# Patient Record
Sex: FEMALE | Race: OTHER | Employment: UNEMPLOYED | URBAN - METROPOLITAN AREA
[De-identification: names, ages, dates, MRNs, and addresses within clinical notes are randomized per-mention and may not be internally consistent; named-entity substitution may affect disease eponyms.]

---

## 2017-04-27 DIAGNOSIS — M25.531 RIGHT WRIST PAIN: Primary | ICD-10-CM

## 2017-05-01 ENCOUNTER — OFFICE VISIT (OUTPATIENT)
Dept: ORTHOPEDIC SURGERY | Age: 62
End: 2017-05-01
Payer: MEDICARE

## 2017-05-01 VITALS — HEIGHT: 62 IN | BODY MASS INDEX: 25.76 KG/M2 | WEIGHT: 140 LBS

## 2017-05-01 DIAGNOSIS — S52.501D CLOSED FRACTURE OF DISTAL END OF RIGHT RADIUS WITH ROUTINE HEALING, UNSPECIFIED FRACTURE MORPHOLOGY, SUBSEQUENT ENCOUNTER: Primary | ICD-10-CM

## 2017-05-01 PROCEDURE — 99213 OFFICE O/P EST LOW 20 MIN: CPT | Performed by: ORTHOPAEDIC SURGERY

## 2017-05-01 ASSESSMENT — ENCOUNTER SYMPTOMS
CHOKING: 0
NAUSEA: 0
CONSTIPATION: 0
COUGH: 0
ABDOMINAL PAIN: 1
CHEST TIGHTNESS: 1
WHEEZING: 0
SHORTNESS OF BREATH: 0
TROUBLE SWALLOWING: 0
VOICE CHANGE: 0
DIARRHEA: 0
VOMITING: 1

## 2017-05-02 ENCOUNTER — HOSPITAL ENCOUNTER (OUTPATIENT)
Dept: MAMMOGRAPHY | Age: 62
Discharge: HOME | End: 2017-05-02
Payer: MEDICARE

## 2017-05-02 DIAGNOSIS — Z12.39 BREAST CANCER SCREENING, HIGH RISK PATIENT: ICD-10-CM

## 2017-05-02 DIAGNOSIS — C50.911 MALIGNANT NEOPLASM OF RIGHT FEMALE BREAST, UNSPECIFIED SITE OF BREAST: ICD-10-CM

## 2017-05-02 PROCEDURE — 77063 BREAST TOMOSYNTHESIS BI: CPT

## 2018-05-24 ENCOUNTER — HOSPITAL ENCOUNTER (OUTPATIENT)
Dept: PREADMISSION TESTING | Age: 63
Discharge: HOME | End: 2018-05-28
Payer: MEDICARE

## 2018-05-24 VITALS
BODY MASS INDEX: 31.47 KG/M2 | HEIGHT: 61 IN | DIASTOLIC BLOOD PRESSURE: 85 MMHG | SYSTOLIC BLOOD PRESSURE: 143 MMHG | OXYGEN SATURATION: 94 % | RESPIRATION RATE: 16 BRPM | WEIGHT: 166.67 LBS | HEART RATE: 65 BPM

## 2018-05-24 LAB
ABO/RH: NORMAL
ABSOLUTE EOS #: 0.1 K/UL (ref 0–0.4)
ABSOLUTE IMMATURE GRANULOCYTE: ABNORMAL K/UL (ref 0–0.3)
ABSOLUTE LYMPH #: 1.4 K/UL (ref 1–4.8)
ABSOLUTE MONO #: 0.5 K/UL (ref 0.2–0.8)
ALBUMIN SERPL-MCNC: 4.5 G/DL (ref 3.5–5.2)
ALBUMIN/GLOBULIN RATIO: ABNORMAL (ref 1–2.5)
ALP BLD-CCNC: 88 U/L (ref 35–104)
ALT SERPL-CCNC: 20 U/L (ref 5–33)
ANION GAP SERPL CALCULATED.3IONS-SCNC: 12 MMOL/L (ref 9–17)
ANTIBODY SCREEN: NEGATIVE
ARM BAND NUMBER: NORMAL
AST SERPL-CCNC: 17 U/L
BASOPHILS # BLD: 1 % (ref 0–2)
BASOPHILS ABSOLUTE: 0 K/UL (ref 0–0.2)
BILIRUB SERPL-MCNC: 0.46 MG/DL (ref 0.3–1.2)
BILIRUBIN URINE: NEGATIVE
BUN BLDV-MCNC: 17 MG/DL (ref 8–23)
BUN/CREAT BLD: 26 (ref 9–20)
CALCIUM SERPL-MCNC: 9.1 MG/DL (ref 8.6–10.4)
CHLORIDE BLD-SCNC: 103 MMOL/L (ref 98–107)
CO2: 26 MMOL/L (ref 20–31)
COLOR: YELLOW
COMMENT UA: NORMAL
CREAT SERPL-MCNC: 0.65 MG/DL (ref 0.5–0.9)
DIFFERENTIAL TYPE: ABNORMAL
DU ANTIGEN: NEGATIVE
EKG ATRIAL RATE: 120 BPM
EKG P AXIS: 26 DEGREES
EKG P-R INTERVAL: 142 MS
EKG Q-T INTERVAL: 470 MS
EKG QRS DURATION: 90 MS
EKG QTC CALCULATION (BAZETT): 473 MS
EKG R AXIS: -42 DEGREES
EKG T AXIS: 19 DEGREES
EKG VENTRICULAR RATE: 61 BPM
EOSINOPHILS RELATIVE PERCENT: 2 % (ref 1–4)
EXPIRATION DATE: NORMAL
GFR AFRICAN AMERICAN: >60 ML/MIN
GFR NON-AFRICAN AMERICAN: >60 ML/MIN
GFR SERPL CREATININE-BSD FRML MDRD: ABNORMAL ML/MIN/{1.73_M2}
GFR SERPL CREATININE-BSD FRML MDRD: ABNORMAL ML/MIN/{1.73_M2}
GLUCOSE BLD-MCNC: 99 MG/DL (ref 70–99)
GLUCOSE URINE: NEGATIVE
HCT VFR BLD CALC: 45.6 % (ref 36–46)
HEMOGLOBIN: 15.2 G/DL (ref 12–16)
IMMATURE GRANULOCYTES: ABNORMAL %
KETONES, URINE: NEGATIVE
LEUKOCYTE ESTERASE, URINE: NEGATIVE
LYMPHOCYTES # BLD: 26 % (ref 24–44)
MCH RBC QN AUTO: 29 PG (ref 26–34)
MCHC RBC AUTO-ENTMCNC: 33.4 G/DL (ref 31–37)
MCV RBC AUTO: 86.9 FL (ref 80–100)
MONOCYTES # BLD: 9 % (ref 1–7)
MRSA, DNA, NASAL: NORMAL
NITRITE, URINE: NEGATIVE
NRBC AUTOMATED: ABNORMAL PER 100 WBC
PDW BLD-RTO: 13.6 % (ref 11.5–14.5)
PH UA: 7 (ref 5–8)
PLATELET # BLD: 299 K/UL (ref 130–400)
PLATELET ESTIMATE: ABNORMAL
PMV BLD AUTO: 7.8 FL (ref 6–12)
POTASSIUM SERPL-SCNC: 4.7 MMOL/L (ref 3.7–5.3)
PROTEIN UA: NEGATIVE
RBC # BLD: 5.25 M/UL (ref 4–5.2)
RBC # BLD: ABNORMAL 10*6/UL
SEDIMENTATION RATE, ERYTHROCYTE: 20 MM (ref 0–20)
SEG NEUTROPHILS: 62 % (ref 36–66)
SEGMENTED NEUTROPHILS ABSOLUTE COUNT: 3.4 K/UL (ref 1.8–7.7)
SODIUM BLD-SCNC: 141 MMOL/L (ref 135–144)
SPECIFIC GRAVITY UA: 1.01 (ref 1–1.03)
SPECIMEN DESCRIPTION: NORMAL
TOTAL PROTEIN: 7.8 G/DL (ref 6.4–8.3)
TURBIDITY: CLEAR
URINE HGB: NEGATIVE
UROBILINOGEN, URINE: NORMAL
WBC # BLD: 5.5 K/UL (ref 3.5–11)
WBC # BLD: ABNORMAL 10*3/UL

## 2018-05-24 PROCEDURE — 86900 BLOOD TYPING SEROLOGIC ABO: CPT

## 2018-05-24 PROCEDURE — 81003 URINALYSIS AUTO W/O SCOPE: CPT

## 2018-05-24 PROCEDURE — 87641 MR-STAPH DNA AMP PROBE: CPT

## 2018-05-24 PROCEDURE — 93005 ELECTROCARDIOGRAM TRACING: CPT

## 2018-05-24 PROCEDURE — 86850 RBC ANTIBODY SCREEN: CPT

## 2018-05-24 PROCEDURE — 85651 RBC SED RATE NONAUTOMATED: CPT

## 2018-05-24 PROCEDURE — 36415 COLL VENOUS BLD VENIPUNCTURE: CPT

## 2018-05-24 PROCEDURE — 80053 COMPREHEN METABOLIC PANEL: CPT

## 2018-05-24 PROCEDURE — 86901 BLOOD TYPING SEROLOGIC RH(D): CPT

## 2018-05-24 PROCEDURE — 85025 COMPLETE CBC W/AUTO DIFF WBC: CPT

## 2018-05-24 RX ORDER — PANTOPRAZOLE SODIUM 40 MG/1
40 TABLET, DELAYED RELEASE ORAL NIGHTLY
COMMUNITY

## 2018-05-24 RX ORDER — TIZANIDINE 4 MG/1
4 TABLET ORAL NIGHTLY
COMMUNITY

## 2018-05-24 RX ORDER — GABAPENTIN 100 MG/1
100 CAPSULE ORAL NIGHTLY
COMMUNITY

## 2018-05-24 RX ORDER — BUPRENORPHINE 15 UG/H
1 PATCH TRANSDERMAL WEEKLY
COMMUNITY

## 2018-05-24 RX ORDER — DULOXETIN HYDROCHLORIDE 30 MG/1
30 CAPSULE, DELAYED RELEASE ORAL NIGHTLY
COMMUNITY

## 2018-05-24 RX ORDER — METOPROLOL SUCCINATE 25 MG/1
25 TABLET, EXTENDED RELEASE ORAL NIGHTLY
Status: ON HOLD | COMMUNITY
End: 2022-10-03 | Stop reason: HOSPADM

## 2018-05-24 ASSESSMENT — PAIN DESCRIPTION - ONSET: ONSET: AWAKENED FROM SLEEP

## 2018-05-24 ASSESSMENT — PAIN DESCRIPTION - PROGRESSION: CLINICAL_PROGRESSION: GRADUALLY WORSENING

## 2018-05-24 ASSESSMENT — PAIN DESCRIPTION - LOCATION: LOCATION: KNEE

## 2018-05-24 ASSESSMENT — PAIN DESCRIPTION - ORIENTATION: ORIENTATION: LEFT

## 2018-05-24 ASSESSMENT — PAIN DESCRIPTION - PAIN TYPE: TYPE: CHRONIC PAIN

## 2018-05-24 ASSESSMENT — PAIN DESCRIPTION - DESCRIPTORS: DESCRIPTORS: BURNING;SHOOTING

## 2018-05-24 ASSESSMENT — PAIN SCALES - GENERAL: PAINLEVEL_OUTOF10: 3

## 2018-05-24 ASSESSMENT — PAIN DESCRIPTION - FREQUENCY: FREQUENCY: CONTINUOUS

## 2018-06-01 ASSESSMENT — PROMIS GLOBAL HEALTH SCALE
IN GENERAL, HOW WOULD YOU RATE YOUR PHYSICAL HEALTH [ON A SCALE OF 1 (POOR) TO 5 (EXCELLENT)]?: 2
TO WHAT EXTENT ARE YOU ABLE TO CARRY OUT YOUR EVERYDAY PHYSICAL ACTIVITIES SUCH AS WALKING, CLIMBING STAIRS, CARRYING GROCERIES, OR MOVING A CHAIR [ON A SCALE OF 1 (NOT AT ALL) TO 5 (COMPLETELY)]?: 3
IN GENERAL, HOW WOULD YOU RATE YOUR MENTAL HEALTH, INCLUDING YOUR MOOD AND YOUR ABILITY TO THINK [ON A SCALE OF 1 (POOR) TO 5 (EXCELLENT)]?: 2
IN THE PAST 7 DAYS, HOW WOULD YOU RATE YOUR PAIN ON AVERAGE [ON A SCALE FROM 0 (NO PAIN) TO 10 (WORST IMAGINABLE PAIN)]?: 6
IN THE PAST 7 DAYS, HOW OFTEN HAVE YOU BEEN BOTHERED BY EMOTIONAL PROBLEMS, SUCH AS FEELING ANXIOUS, DEPRESSED, OR IRRITABLE [ON A SCALE FROM 1 (NEVER) TO 5 (ALWAYS)]?: 3
IN GENERAL, PLEASE RATE HOW WELL YOU CARRY OUT YOUR USUAL SOCIAL ACTIVITIES (INCLUDES ACTIVITIES AT HOME, AT WORK, AND IN YOUR COMMUNITY, AND RESPONSIBILITIES AS A PARENT, CHILD, SPOUSE, EMPLOYEE, FRIEND, ETC) [ON A SCALE OF 1 (POOR) TO 5 (EXCELLENT)]?: 2
SUM OF RESPONSES TO QUESTIONS 3, 6, 7, & 8: 14
IN GENERAL, HOW WOULD YOU RATE YOUR SATISFACTION WITH YOUR SOCIAL ACTIVITIES AND RELATIONSHIPS [ON A SCALE OF 1 (POOR) TO 5 (EXCELLENT)]?: 3
SUM OF RESPONSES TO QUESTIONS 2, 4, 5, & 10: 10
IN GENERAL, WOULD YOU SAY YOUR HEALTH IS...[ON A SCALE OF 1 (POOR) TO 5 (EXCELLENT)]: 3
IN THE PAST 7 DAYS, HOW WOULD YOU RATE YOUR FATIGUE ON AVERAGE [ON A SCALE FROM 1 (NONE) TO 5 (VERY SEVERE)]?: 3
IN GENERAL, WOULD YOU SAY YOUR QUALITY OF LIFE IS...[ON A SCALE OF 1 (POOR) TO 5 (EXCELLENT)]: 2

## 2018-06-01 ASSESSMENT — KOOS JR
GOING UP OR DOWN STAIRS: 2
BENDING TO THE FLOOR TO PICK UP OBJECT: 3
TWISING OR PIVOTING ON KNEE: 3
STRAIGHTENING KNEE FULLY: 2
HOW SEVERE IS YOUR KNEE STIFFNESS AFTER FIRST WAKING IN MORNING: 2
STANDING UPRIGHT: 2
RISING FROM SITTING: 3

## 2018-06-08 ENCOUNTER — ANESTHESIA EVENT (OUTPATIENT)
Dept: OPERATING ROOM | Age: 63
DRG: 302 | End: 2018-06-08
Payer: MEDICARE

## 2018-06-11 ENCOUNTER — HOSPITAL ENCOUNTER (INPATIENT)
Age: 63
LOS: 1 days | Discharge: NURSING, REHAB OR LONG-TERM CARE | DRG: 302 | End: 2018-06-12
Attending: ORTHOPAEDIC SURGERY | Admitting: ORTHOPAEDIC SURGERY
Payer: MEDICARE

## 2018-06-11 ENCOUNTER — APPOINTMENT (OUTPATIENT)
Dept: GENERAL RADIOLOGY | Age: 63
DRG: 302 | End: 2018-06-11
Attending: ORTHOPAEDIC SURGERY
Payer: MEDICARE

## 2018-06-11 ENCOUNTER — ANESTHESIA (OUTPATIENT)
Dept: OPERATING ROOM | Age: 63
DRG: 302 | End: 2018-06-11
Payer: MEDICARE

## 2018-06-11 VITALS — DIASTOLIC BLOOD PRESSURE: 66 MMHG | OXYGEN SATURATION: 98 % | TEMPERATURE: 99.1 F | SYSTOLIC BLOOD PRESSURE: 133 MMHG

## 2018-06-11 DIAGNOSIS — M17.12 PRIMARY OSTEOARTHRITIS OF LEFT KNEE: Primary | Chronic | ICD-10-CM

## 2018-06-11 PROCEDURE — G8979 MOBILITY GOAL STATUS: HCPCS

## 2018-06-11 PROCEDURE — G8988 SELF CARE GOAL STATUS: HCPCS

## 2018-06-11 PROCEDURE — 6360000002 HC RX W HCPCS: Performed by: ANESTHESIOLOGY

## 2018-06-11 PROCEDURE — 8E0Y0CZ ROBOTIC ASSISTED PROCEDURE OF LOWER EXTREMITY, OPEN APPROACH: ICD-10-PCS | Performed by: ORTHOPAEDIC SURGERY

## 2018-06-11 PROCEDURE — 2580000003 HC RX 258: Performed by: ANESTHESIOLOGY

## 2018-06-11 PROCEDURE — 3600000005 HC SURGERY LEVEL 5 BASE: Performed by: ORTHOPAEDIC SURGERY

## 2018-06-11 PROCEDURE — G0378 HOSPITAL OBSERVATION PER HR: HCPCS

## 2018-06-11 PROCEDURE — G8987 SELF CARE CURRENT STATUS: HCPCS

## 2018-06-11 PROCEDURE — 3700000001 HC ADD 15 MINUTES (ANESTHESIA): Performed by: ORTHOPAEDIC SURGERY

## 2018-06-11 PROCEDURE — 6360000002 HC RX W HCPCS

## 2018-06-11 PROCEDURE — 6360000002 HC RX W HCPCS: Performed by: ORTHOPAEDIC SURGERY

## 2018-06-11 PROCEDURE — 6370000000 HC RX 637 (ALT 250 FOR IP): Performed by: ORTHOPAEDIC SURGERY

## 2018-06-11 PROCEDURE — 73560 X-RAY EXAM OF KNEE 1 OR 2: CPT

## 2018-06-11 PROCEDURE — 2500000003 HC RX 250 WO HCPCS: Performed by: NURSE ANESTHETIST, CERTIFIED REGISTERED

## 2018-06-11 PROCEDURE — 3700000000 HC ANESTHESIA ATTENDED CARE: Performed by: ORTHOPAEDIC SURGERY

## 2018-06-11 PROCEDURE — 6370000000 HC RX 637 (ALT 250 FOR IP): Performed by: ANESTHESIOLOGY

## 2018-06-11 PROCEDURE — 0SRD0J9 REPLACEMENT OF LEFT KNEE JOINT WITH SYNTHETIC SUBSTITUTE, CEMENTED, OPEN APPROACH: ICD-10-PCS | Performed by: ORTHOPAEDIC SURGERY

## 2018-06-11 PROCEDURE — 97166 OT EVAL MOD COMPLEX 45 MIN: CPT

## 2018-06-11 PROCEDURE — 2580000003 HC RX 258: Performed by: ORTHOPAEDIC SURGERY

## 2018-06-11 PROCEDURE — G8978 MOBILITY CURRENT STATUS: HCPCS

## 2018-06-11 PROCEDURE — 7100000000 HC PACU RECOVERY - FIRST 15 MIN: Performed by: ORTHOPAEDIC SURGERY

## 2018-06-11 PROCEDURE — 97535 SELF CARE MNGMENT TRAINING: CPT

## 2018-06-11 PROCEDURE — 2500000003 HC RX 250 WO HCPCS: Performed by: ORTHOPAEDIC SURGERY

## 2018-06-11 PROCEDURE — A6454 SELF-ADHER BAND W>=3" <5"/YD: HCPCS | Performed by: ORTHOPAEDIC SURGERY

## 2018-06-11 PROCEDURE — C1713 ANCHOR/SCREW BN/BN,TIS/BN: HCPCS | Performed by: ORTHOPAEDIC SURGERY

## 2018-06-11 PROCEDURE — 97110 THERAPEUTIC EXERCISES: CPT

## 2018-06-11 PROCEDURE — 1200000000 HC SEMI PRIVATE

## 2018-06-11 PROCEDURE — 3600000015 HC SURGERY LEVEL 5 ADDTL 15MIN: Performed by: ORTHOPAEDIC SURGERY

## 2018-06-11 PROCEDURE — C1776 JOINT DEVICE (IMPLANTABLE): HCPCS | Performed by: ORTHOPAEDIC SURGERY

## 2018-06-11 PROCEDURE — 2500000003 HC RX 250 WO HCPCS: Performed by: ANESTHESIOLOGY

## 2018-06-11 PROCEDURE — 2720000010 HC SURG SUPPLY STERILE: Performed by: ORTHOPAEDIC SURGERY

## 2018-06-11 PROCEDURE — 2500000003 HC RX 250 WO HCPCS

## 2018-06-11 PROCEDURE — 7100000001 HC PACU RECOVERY - ADDTL 15 MIN: Performed by: ORTHOPAEDIC SURGERY

## 2018-06-11 PROCEDURE — 64447 NJX AA&/STRD FEMORAL NRV IMG: CPT | Performed by: ANESTHESIOLOGY

## 2018-06-11 PROCEDURE — 97116 GAIT TRAINING THERAPY: CPT

## 2018-06-11 PROCEDURE — 2580000003 HC RX 258

## 2018-06-11 PROCEDURE — 97162 PT EVAL MOD COMPLEX 30 MIN: CPT

## 2018-06-11 DEVICE — CEMENT BNE 40GM FULL DOSE PMMA W/O ANTIBIO HI VISC N RADPQ: Type: IMPLANTABLE DEVICE | Site: KNEE | Status: FUNCTIONAL

## 2018-06-11 DEVICE — GENESIS II OVAL RESURFACING                                    PATELLAR 35MM
Type: IMPLANTABLE DEVICE | Site: KNEE | Status: FUNCTIONAL
Brand: GENESIS II

## 2018-06-11 DEVICE — JOURNEY II BCS FEMORAL OXINIUM                                    LEFT SIZE 4
Type: IMPLANTABLE DEVICE | Site: KNEE | Status: FUNCTIONAL
Brand: JOURNEY

## 2018-06-11 DEVICE — JOURNEY II BCS XLPE ARTICULAR                                    INSERT SIZE 3-4 LEFT 15MM
Type: IMPLANTABLE DEVICE | Site: KNEE | Status: FUNCTIONAL
Brand: JOURNEY

## 2018-06-11 DEVICE — JOURNEY TIBIAL BASEPLATE NONPOROUS                                    LEFT SIZE 3
Type: IMPLANTABLE DEVICE | Site: KNEE | Status: FUNCTIONAL
Brand: JOURNEY

## 2018-06-11 RX ORDER — HYDROMORPHONE HCL 110MG/55ML
0.25 PATIENT CONTROLLED ANALGESIA SYRINGE INTRAVENOUS EVERY 5 MIN PRN
Status: DISCONTINUED | OUTPATIENT
Start: 2018-06-11 | End: 2018-06-11 | Stop reason: HOSPADM

## 2018-06-11 RX ORDER — PHENYLEPHRINE HCL IN 0.9% NACL 0.5 MG/5ML
SYRINGE (ML) INTRAVENOUS PRN
Status: DISCONTINUED | OUTPATIENT
Start: 2018-06-11 | End: 2018-06-11 | Stop reason: SDUPTHER

## 2018-06-11 RX ORDER — ONDANSETRON 2 MG/ML
4 INJECTION INTRAMUSCULAR; INTRAVENOUS
Status: DISCONTINUED | OUTPATIENT
Start: 2018-06-11 | End: 2018-06-11 | Stop reason: HOSPADM

## 2018-06-11 RX ORDER — HYDROCODONE BITARTRATE AND ACETAMINOPHEN 5; 325 MG/1; MG/1
1 TABLET ORAL EVERY 4 HOURS PRN
Status: DISCONTINUED | OUTPATIENT
Start: 2018-06-11 | End: 2018-06-12 | Stop reason: HOSPADM

## 2018-06-11 RX ORDER — PROMETHAZINE HYDROCHLORIDE 25 MG/ML
6.25 INJECTION, SOLUTION INTRAMUSCULAR; INTRAVENOUS
Status: DISCONTINUED | OUTPATIENT
Start: 2018-06-11 | End: 2018-06-11 | Stop reason: HOSPADM

## 2018-06-11 RX ORDER — FENTANYL CITRATE 50 UG/ML
50 INJECTION, SOLUTION INTRAMUSCULAR; INTRAVENOUS EVERY 5 MIN PRN
Status: DISCONTINUED | OUTPATIENT
Start: 2018-06-11 | End: 2018-06-11 | Stop reason: HOSPADM

## 2018-06-11 RX ORDER — ONDANSETRON 4 MG/1
4 TABLET, ORALLY DISINTEGRATING ORAL EVERY 6 HOURS PRN
Status: DISCONTINUED | OUTPATIENT
Start: 2018-06-11 | End: 2018-06-12 | Stop reason: HOSPADM

## 2018-06-11 RX ORDER — ATORVASTATIN CALCIUM 10 MG/1
10 TABLET, FILM COATED ORAL NIGHTLY
Status: DISCONTINUED | OUTPATIENT
Start: 2018-06-11 | End: 2018-06-12 | Stop reason: HOSPADM

## 2018-06-11 RX ORDER — SODIUM CHLORIDE 0.9 % (FLUSH) 0.9 %
10 SYRINGE (ML) INJECTION PRN
Status: DISCONTINUED | OUTPATIENT
Start: 2018-06-11 | End: 2018-06-11 | Stop reason: HOSPADM

## 2018-06-11 RX ORDER — PANTOPRAZOLE SODIUM 40 MG/1
40 TABLET, DELAYED RELEASE ORAL NIGHTLY
Status: DISCONTINUED | OUTPATIENT
Start: 2018-06-11 | End: 2018-06-12 | Stop reason: HOSPADM

## 2018-06-11 RX ORDER — LIDOCAINE HYDROCHLORIDE 10 MG/ML
1 INJECTION, SOLUTION EPIDURAL; INFILTRATION; INTRACAUDAL; PERINEURAL
Status: DISCONTINUED | OUTPATIENT
Start: 2018-06-11 | End: 2018-06-11 | Stop reason: HOSPADM

## 2018-06-11 RX ORDER — ASCORBIC ACID 500 MG
500 TABLET ORAL DAILY
COMMUNITY

## 2018-06-11 RX ORDER — MORPHINE SULFATE 2 MG/ML
2 INJECTION, SOLUTION INTRAMUSCULAR; INTRAVENOUS
Status: DISCONTINUED | OUTPATIENT
Start: 2018-06-11 | End: 2018-06-12 | Stop reason: HOSPADM

## 2018-06-11 RX ORDER — GABAPENTIN 300 MG/1
300 CAPSULE ORAL ONCE
Status: COMPLETED | OUTPATIENT
Start: 2018-06-11 | End: 2018-06-11

## 2018-06-11 RX ORDER — FENTANYL CITRATE 50 UG/ML
25 INJECTION, SOLUTION INTRAMUSCULAR; INTRAVENOUS EVERY 5 MIN PRN
Status: DISCONTINUED | OUTPATIENT
Start: 2018-06-11 | End: 2018-06-11 | Stop reason: HOSPADM

## 2018-06-11 RX ORDER — ONDANSETRON 2 MG/ML
4 INJECTION INTRAMUSCULAR; INTRAVENOUS EVERY 6 HOURS PRN
Status: DISCONTINUED | OUTPATIENT
Start: 2018-06-11 | End: 2018-06-11

## 2018-06-11 RX ORDER — ACETAMINOPHEN 325 MG/1
650 TABLET ORAL EVERY 4 HOURS PRN
Status: DISCONTINUED | OUTPATIENT
Start: 2018-06-11 | End: 2018-06-12 | Stop reason: HOSPADM

## 2018-06-11 RX ORDER — DULOXETIN HYDROCHLORIDE 30 MG/1
30 CAPSULE, DELAYED RELEASE ORAL NIGHTLY
Status: DISCONTINUED | OUTPATIENT
Start: 2018-06-11 | End: 2018-06-12 | Stop reason: HOSPADM

## 2018-06-11 RX ORDER — SODIUM CHLORIDE 9 MG/ML
INJECTION, SOLUTION INTRAVENOUS CONTINUOUS
Status: DISCONTINUED | OUTPATIENT
Start: 2018-06-11 | End: 2018-06-11

## 2018-06-11 RX ORDER — LIDOCAINE HYDROCHLORIDE 10 MG/ML
INJECTION, SOLUTION INFILTRATION; PERINEURAL PRN
Status: DISCONTINUED | OUTPATIENT
Start: 2018-06-11 | End: 2018-06-11 | Stop reason: SDUPTHER

## 2018-06-11 RX ORDER — CELECOXIB 100 MG/1
100 CAPSULE ORAL ONCE
Status: COMPLETED | OUTPATIENT
Start: 2018-06-11 | End: 2018-06-11

## 2018-06-11 RX ORDER — SODIUM CHLORIDE 0.9 % (FLUSH) 0.9 %
10 SYRINGE (ML) INJECTION EVERY 12 HOURS SCHEDULED
Status: DISCONTINUED | OUTPATIENT
Start: 2018-06-11 | End: 2018-06-11 | Stop reason: HOSPADM

## 2018-06-11 RX ORDER — ONDANSETRON 2 MG/ML
4 INJECTION INTRAMUSCULAR; INTRAVENOUS EVERY 6 HOURS PRN
Status: DISCONTINUED | OUTPATIENT
Start: 2018-06-11 | End: 2018-06-12 | Stop reason: HOSPADM

## 2018-06-11 RX ORDER — SODIUM CHLORIDE, SODIUM LACTATE, POTASSIUM CHLORIDE, CALCIUM CHLORIDE 600; 310; 30; 20 MG/100ML; MG/100ML; MG/100ML; MG/100ML
INJECTION, SOLUTION INTRAVENOUS CONTINUOUS PRN
Status: DISCONTINUED | OUTPATIENT
Start: 2018-06-11 | End: 2018-06-11 | Stop reason: SDUPTHER

## 2018-06-11 RX ORDER — CELECOXIB 200 MG/1
200 CAPSULE ORAL 2 TIMES DAILY
Status: DISCONTINUED | OUTPATIENT
Start: 2018-06-11 | End: 2018-06-12 | Stop reason: HOSPADM

## 2018-06-11 RX ORDER — ROPIVACAINE HYDROCHLORIDE 5 MG/ML
INJECTION, SOLUTION EPIDURAL; INFILTRATION; PERINEURAL PRN
Status: DISCONTINUED | OUTPATIENT
Start: 2018-06-11 | End: 2018-06-11 | Stop reason: SDUPTHER

## 2018-06-11 RX ORDER — MORPHINE SULFATE 4 MG/ML
4 INJECTION, SOLUTION INTRAMUSCULAR; INTRAVENOUS
Status: DISCONTINUED | OUTPATIENT
Start: 2018-06-11 | End: 2018-06-12 | Stop reason: HOSPADM

## 2018-06-11 RX ORDER — ALPRAZOLAM 0.5 MG/1
0.5 TABLET ORAL NIGHTLY
Status: DISCONTINUED | OUTPATIENT
Start: 2018-06-11 | End: 2018-06-12 | Stop reason: HOSPADM

## 2018-06-11 RX ORDER — SCOLOPAMINE TRANSDERMAL SYSTEM 1 MG/1
1 PATCH, EXTENDED RELEASE TRANSDERMAL
Status: DISCONTINUED | OUTPATIENT
Start: 2018-06-11 | End: 2018-06-11

## 2018-06-11 RX ORDER — SCOLOPAMINE TRANSDERMAL SYSTEM 1 MG/1
1 PATCH, EXTENDED RELEASE TRANSDERMAL ONCE
Status: DISCONTINUED | OUTPATIENT
Start: 2018-06-11 | End: 2018-06-12 | Stop reason: HOSPADM

## 2018-06-11 RX ORDER — HYDROMORPHONE HCL 110MG/55ML
0.5 PATIENT CONTROLLED ANALGESIA SYRINGE INTRAVENOUS EVERY 5 MIN PRN
Status: DISCONTINUED | OUTPATIENT
Start: 2018-06-11 | End: 2018-06-11 | Stop reason: HOSPADM

## 2018-06-11 RX ORDER — MIDAZOLAM HYDROCHLORIDE 1 MG/ML
INJECTION INTRAMUSCULAR; INTRAVENOUS PRN
Status: DISCONTINUED | OUTPATIENT
Start: 2018-06-11 | End: 2018-06-11 | Stop reason: SDUPTHER

## 2018-06-11 RX ORDER — SODIUM CHLORIDE, SODIUM LACTATE, POTASSIUM CHLORIDE, CALCIUM CHLORIDE 600; 310; 30; 20 MG/100ML; MG/100ML; MG/100ML; MG/100ML
INJECTION, SOLUTION INTRAVENOUS CONTINUOUS
Status: DISCONTINUED | OUTPATIENT
Start: 2018-06-11 | End: 2018-06-11

## 2018-06-11 RX ORDER — BUPRENORPHINE 15 UG/H
1 PATCH TRANSDERMAL WEEKLY
Status: DISCONTINUED | OUTPATIENT
Start: 2018-06-13 | End: 2018-06-12 | Stop reason: HOSPADM

## 2018-06-11 RX ORDER — LETROZOLE 2.5 MG/1
2.5 TABLET, FILM COATED ORAL NIGHTLY
Status: DISCONTINUED | OUTPATIENT
Start: 2018-06-11 | End: 2018-06-12 | Stop reason: HOSPADM

## 2018-06-11 RX ORDER — TRANEXAMIC ACID 100 MG/ML
INJECTION, SOLUTION INTRAVENOUS PRN
Status: DISCONTINUED | OUTPATIENT
Start: 2018-06-11 | End: 2018-06-11 | Stop reason: SDUPTHER

## 2018-06-11 RX ORDER — SODIUM CHLORIDE, SODIUM LACTATE, POTASSIUM CHLORIDE, CALCIUM CHLORIDE 600; 310; 30; 20 MG/100ML; MG/100ML; MG/100ML; MG/100ML
INJECTION, SOLUTION INTRAVENOUS CONTINUOUS
Status: DISCONTINUED | OUTPATIENT
Start: 2018-06-11 | End: 2018-06-12 | Stop reason: HOSPADM

## 2018-06-11 RX ORDER — METOPROLOL SUCCINATE 25 MG/1
25 TABLET, EXTENDED RELEASE ORAL NIGHTLY
Status: DISCONTINUED | OUTPATIENT
Start: 2018-06-11 | End: 2018-06-12 | Stop reason: HOSPADM

## 2018-06-11 RX ORDER — TIZANIDINE 4 MG/1
4 TABLET ORAL NIGHTLY
Status: DISCONTINUED | OUTPATIENT
Start: 2018-06-11 | End: 2018-06-12 | Stop reason: HOSPADM

## 2018-06-11 RX ORDER — DOCUSATE SODIUM 100 MG/1
100 CAPSULE, LIQUID FILLED ORAL 2 TIMES DAILY
Status: DISCONTINUED | OUTPATIENT
Start: 2018-06-11 | End: 2018-06-12 | Stop reason: HOSPADM

## 2018-06-11 RX ORDER — ASCORBIC ACID 500 MG
500 TABLET ORAL DAILY
Status: DISCONTINUED | OUTPATIENT
Start: 2018-06-11 | End: 2018-06-12 | Stop reason: HOSPADM

## 2018-06-11 RX ORDER — FENTANYL CITRATE 50 UG/ML
INJECTION, SOLUTION INTRAMUSCULAR; INTRAVENOUS PRN
Status: DISCONTINUED | OUTPATIENT
Start: 2018-06-11 | End: 2018-06-11 | Stop reason: SDUPTHER

## 2018-06-11 RX ORDER — HYDROCODONE BITARTRATE AND ACETAMINOPHEN 5; 325 MG/1; MG/1
2 TABLET ORAL EVERY 4 HOURS PRN
Status: DISCONTINUED | OUTPATIENT
Start: 2018-06-11 | End: 2018-06-12 | Stop reason: HOSPADM

## 2018-06-11 RX ORDER — BUPIVACAINE HYDROCHLORIDE 7.5 MG/ML
INJECTION, SOLUTION INTRASPINAL PRN
Status: DISCONTINUED | OUTPATIENT
Start: 2018-06-11 | End: 2018-06-11 | Stop reason: SDUPTHER

## 2018-06-11 RX ORDER — MIDAZOLAM HYDROCHLORIDE 1 MG/ML
2 INJECTION INTRAMUSCULAR; INTRAVENOUS ONCE
Status: COMPLETED | OUTPATIENT
Start: 2018-06-11 | End: 2018-06-11

## 2018-06-11 RX ORDER — PROPOFOL 10 MG/ML
INJECTION, EMULSION INTRAVENOUS PRN
Status: DISCONTINUED | OUTPATIENT
Start: 2018-06-11 | End: 2018-06-11 | Stop reason: SDUPTHER

## 2018-06-11 RX ORDER — POLYVINYL ALCOHOL 14 MG/ML
1 SOLUTION/ DROPS OPHTHALMIC EVERY 4 HOURS PRN
Status: DISCONTINUED | OUTPATIENT
Start: 2018-06-11 | End: 2018-06-12 | Stop reason: HOSPADM

## 2018-06-11 RX ORDER — PROPOFOL 10 MG/ML
INJECTION, EMULSION INTRAVENOUS CONTINUOUS PRN
Status: DISCONTINUED | OUTPATIENT
Start: 2018-06-11 | End: 2018-06-11 | Stop reason: SDUPTHER

## 2018-06-11 RX ADMIN — ROPIVACAINE HYDROCHLORIDE 30 ML: 5 INJECTION, SOLUTION EPIDURAL; INFILTRATION; PERINEURAL at 07:06

## 2018-06-11 RX ADMIN — FENTANYL CITRATE 25 MCG: 50 INJECTION, SOLUTION INTRAMUSCULAR; INTRAVENOUS at 07:44

## 2018-06-11 RX ADMIN — SODIUM CHLORIDE, POTASSIUM CHLORIDE, SODIUM LACTATE AND CALCIUM CHLORIDE: 600; 310; 30; 20 INJECTION, SOLUTION INTRAVENOUS at 19:26

## 2018-06-11 RX ADMIN — ALPRAZOLAM 0.5 MG: 0.5 TABLET ORAL at 21:24

## 2018-06-11 RX ADMIN — PANTOPRAZOLE SODIUM 40 MG: 40 TABLET, DELAYED RELEASE ORAL at 21:24

## 2018-06-11 RX ADMIN — METOPROLOL SUCCINATE 25 MG: 25 TABLET, FILM COATED, EXTENDED RELEASE ORAL at 21:24

## 2018-06-11 RX ADMIN — SODIUM CHLORIDE, POTASSIUM CHLORIDE, SODIUM LACTATE AND CALCIUM CHLORIDE: 600; 310; 30; 20 INJECTION, SOLUTION INTRAVENOUS at 07:33

## 2018-06-11 RX ADMIN — VITAMIN D, TAB 1000IU (100/BT) 1000 UNITS: 25 TAB at 15:04

## 2018-06-11 RX ADMIN — VITAMIN D, TAB 1000IU (100/BT) 1000 UNITS: 25 TAB at 21:24

## 2018-06-11 RX ADMIN — CELECOXIB 100 MG: 100 CAPSULE ORAL at 07:14

## 2018-06-11 RX ADMIN — CEFAZOLIN SODIUM 2 G: 10 INJECTION, POWDER, FOR SOLUTION INTRAVENOUS at 15:04

## 2018-06-11 RX ADMIN — SODIUM CHLORIDE, POTASSIUM CHLORIDE, SODIUM LACTATE AND CALCIUM CHLORIDE: 600; 310; 30; 20 INJECTION, SOLUTION INTRAVENOUS at 08:36

## 2018-06-11 RX ADMIN — TIZANIDINE 4 MG: 4 TABLET ORAL at 21:24

## 2018-06-11 RX ADMIN — LIDOCAINE HYDROCHLORIDE 2 ML: 10 INJECTION, SOLUTION INFILTRATION; PERINEURAL at 07:10

## 2018-06-11 RX ADMIN — LETROZOLE 2.5 MG: 2.5 TABLET ORAL at 21:34

## 2018-06-11 RX ADMIN — DOCUSATE SODIUM 100 MG: 100 CAPSULE, LIQUID FILLED ORAL at 21:24

## 2018-06-11 RX ADMIN — MIDAZOLAM HYDROCHLORIDE 2 MG: 1 INJECTION, SOLUTION INTRAMUSCULAR; INTRAVENOUS at 07:32

## 2018-06-11 RX ADMIN — Medication 50 MCG: at 08:36

## 2018-06-11 RX ADMIN — MORPHINE SULFATE 2 MG: 2 INJECTION, SOLUTION INTRAMUSCULAR; INTRAVENOUS at 19:31

## 2018-06-11 RX ADMIN — Medication 100 MCG: at 08:48

## 2018-06-11 RX ADMIN — CEFAZOLIN SODIUM 2 G: 10 INJECTION, POWDER, FOR SOLUTION INTRAVENOUS at 23:23

## 2018-06-11 RX ADMIN — ASPIRIN 325 MG: 325 TABLET, DELAYED RELEASE ORAL at 21:24

## 2018-06-11 RX ADMIN — MIDAZOLAM HYDROCHLORIDE 2 MG: 1 INJECTION, SOLUTION INTRAMUSCULAR; INTRAVENOUS at 07:00

## 2018-06-11 RX ADMIN — MORPHINE SULFATE 2 MG: 2 INJECTION, SOLUTION INTRAMUSCULAR; INTRAVENOUS at 15:04

## 2018-06-11 RX ADMIN — SODIUM CHLORIDE, POTASSIUM CHLORIDE, SODIUM LACTATE AND CALCIUM CHLORIDE: 600; 310; 30; 20 INJECTION, SOLUTION INTRAVENOUS at 06:36

## 2018-06-11 RX ADMIN — HYDROCODONE BITARTRATE AND ACETAMINOPHEN 2 TABLET: 5; 325 TABLET ORAL at 16:43

## 2018-06-11 RX ADMIN — TRANEXAMIC ACID 700 MG: 100 INJECTION, SOLUTION INTRAVENOUS at 10:19

## 2018-06-11 RX ADMIN — BUPIVACAINE HYDROCHLORIDE IN DEXTROSE 1.8 ML: 7.5 INJECTION, SOLUTION SUBARACHNOID at 07:44

## 2018-06-11 RX ADMIN — ASPIRIN 325 MG: 325 TABLET, DELAYED RELEASE ORAL at 15:04

## 2018-06-11 RX ADMIN — ATORVASTATIN CALCIUM 10 MG: 10 TABLET, FILM COATED ORAL at 21:24

## 2018-06-11 RX ADMIN — SODIUM CHLORIDE, POTASSIUM CHLORIDE, SODIUM LACTATE AND CALCIUM CHLORIDE: 600; 310; 30; 20 INJECTION, SOLUTION INTRAVENOUS at 15:05

## 2018-06-11 RX ADMIN — PROPOFOL 50 MG: 10 INJECTION, EMULSION INTRAVENOUS at 07:51

## 2018-06-11 RX ADMIN — Medication 50 MCG: at 08:39

## 2018-06-11 RX ADMIN — PROPOFOL 50 MCG/KG/MIN: 10 INJECTION, EMULSION INTRAVENOUS at 07:53

## 2018-06-11 RX ADMIN — LIDOCAINE HYDROCHLORIDE 3 ML: 10 INJECTION, SOLUTION INFILTRATION; PERINEURAL at 07:06

## 2018-06-11 RX ADMIN — ROPIVACAINE HYDROCHLORIDE 20 ML: 5 INJECTION, SOLUTION EPIDURAL; INFILTRATION; PERINEURAL at 07:10

## 2018-06-11 RX ADMIN — Medication 100 MCG: at 08:31

## 2018-06-11 RX ADMIN — SODIUM CHLORIDE, POTASSIUM CHLORIDE, SODIUM LACTATE AND CALCIUM CHLORIDE: 600; 310; 30; 20 INJECTION, SOLUTION INTRAVENOUS at 11:10

## 2018-06-11 RX ADMIN — Medication 500 MG: at 15:04

## 2018-06-11 RX ADMIN — CELECOXIB 200 MG: 200 CAPSULE ORAL at 21:34

## 2018-06-11 RX ADMIN — TRANEXAMIC ACID 800 MG: 100 INJECTION, SOLUTION INTRAVENOUS at 08:00

## 2018-06-11 RX ADMIN — GABAPENTIN 300 MG: 300 CAPSULE ORAL at 07:14

## 2018-06-11 RX ADMIN — HYDROCODONE BITARTRATE AND ACETAMINOPHEN 2 TABLET: 5; 325 TABLET ORAL at 23:22

## 2018-06-11 RX ADMIN — DOCUSATE SODIUM 100 MG: 100 CAPSULE, LIQUID FILLED ORAL at 15:04

## 2018-06-11 ASSESSMENT — PULMONARY FUNCTION TESTS
PIF_VALUE: 0
PIF_VALUE: 0
PIF_VALUE: 1
PIF_VALUE: 0
PIF_VALUE: 1
PIF_VALUE: 0
PIF_VALUE: 1
PIF_VALUE: 1
PIF_VALUE: 0
PIF_VALUE: 1
PIF_VALUE: 0
PIF_VALUE: 0
PIF_VALUE: 1
PIF_VALUE: 0
PIF_VALUE: 1
PIF_VALUE: 0
PIF_VALUE: 1
PIF_VALUE: 0
PIF_VALUE: 1
PIF_VALUE: 1
PIF_VALUE: 0
PIF_VALUE: 1
PIF_VALUE: 1
PIF_VALUE: 0
PIF_VALUE: 1
PIF_VALUE: 1
PIF_VALUE: 0
PIF_VALUE: 1
PIF_VALUE: 0
PIF_VALUE: 1
PIF_VALUE: 1
PIF_VALUE: 0
PIF_VALUE: 0
PIF_VALUE: 1
PIF_VALUE: 0
PIF_VALUE: 1
PIF_VALUE: 0
PIF_VALUE: 1
PIF_VALUE: 0
PIF_VALUE: 1
PIF_VALUE: 0
PIF_VALUE: 1
PIF_VALUE: 0
PIF_VALUE: 0
PIF_VALUE: 1
PIF_VALUE: 0
PIF_VALUE: 1
PIF_VALUE: 0
PIF_VALUE: 1
PIF_VALUE: 0
PIF_VALUE: 0
PIF_VALUE: 1
PIF_VALUE: 0
PIF_VALUE: 1
PIF_VALUE: 0
PIF_VALUE: 1
PIF_VALUE: 1
PIF_VALUE: 0
PIF_VALUE: 1
PIF_VALUE: 0
PIF_VALUE: 1
PIF_VALUE: 1
PIF_VALUE: 0
PIF_VALUE: 1
PIF_VALUE: 0
PIF_VALUE: 0
PIF_VALUE: 1
PIF_VALUE: 1
PIF_VALUE: 0
PIF_VALUE: 1
PIF_VALUE: 0
PIF_VALUE: 1
PIF_VALUE: 0
PIF_VALUE: 1
PIF_VALUE: 0
PIF_VALUE: 1
PIF_VALUE: 0

## 2018-06-11 ASSESSMENT — PAIN SCALES - GENERAL
PAINLEVEL_OUTOF10: 7
PAINLEVEL_OUTOF10: 2
PAINLEVEL_OUTOF10: 3
PAINLEVEL_OUTOF10: 2
PAINLEVEL_OUTOF10: 3
PAINLEVEL_OUTOF10: 3
PAINLEVEL_OUTOF10: 0
PAINLEVEL_OUTOF10: 4
PAINLEVEL_OUTOF10: 0
PAINLEVEL_OUTOF10: 6
PAINLEVEL_OUTOF10: 2
PAINLEVEL_OUTOF10: 8
PAINLEVEL_OUTOF10: 3
PAINLEVEL_OUTOF10: 6
PAINLEVEL_OUTOF10: 5
PAINLEVEL_OUTOF10: 6

## 2018-06-11 ASSESSMENT — PAIN DESCRIPTION - ORIENTATION
ORIENTATION: LEFT
ORIENTATION: LEFT;ANTERIOR
ORIENTATION: LEFT

## 2018-06-11 ASSESSMENT — PAIN DESCRIPTION - FREQUENCY
FREQUENCY: CONTINUOUS
FREQUENCY: CONTINUOUS

## 2018-06-11 ASSESSMENT — PAIN DESCRIPTION - LOCATION
LOCATION: KNEE

## 2018-06-11 ASSESSMENT — PAIN DESCRIPTION - PROGRESSION
CLINICAL_PROGRESSION: NOT CHANGED
CLINICAL_PROGRESSION: NOT CHANGED

## 2018-06-11 ASSESSMENT — PAIN DESCRIPTION - PAIN TYPE
TYPE: SURGICAL PAIN

## 2018-06-11 ASSESSMENT — PAIN DESCRIPTION - DESCRIPTORS
DESCRIPTORS: DISCOMFORT
DESCRIPTORS: ACHING
DESCRIPTORS: ACHING

## 2018-06-11 ASSESSMENT — PAIN DESCRIPTION - ONSET
ONSET: ON-GOING
ONSET: ON-GOING

## 2018-06-12 VITALS
HEIGHT: 61 IN | RESPIRATION RATE: 16 BRPM | OXYGEN SATURATION: 93 % | BODY MASS INDEX: 31.47 KG/M2 | SYSTOLIC BLOOD PRESSURE: 131 MMHG | WEIGHT: 166.67 LBS | HEART RATE: 85 BPM | DIASTOLIC BLOOD PRESSURE: 68 MMHG | TEMPERATURE: 99 F

## 2018-06-12 PROCEDURE — 97535 SELF CARE MNGMENT TRAINING: CPT | Performed by: NURSE PRACTITIONER

## 2018-06-12 PROCEDURE — 97530 THERAPEUTIC ACTIVITIES: CPT | Performed by: NURSE PRACTITIONER

## 2018-06-12 PROCEDURE — G0378 HOSPITAL OBSERVATION PER HR: HCPCS

## 2018-06-12 PROCEDURE — 97110 THERAPEUTIC EXERCISES: CPT

## 2018-06-12 PROCEDURE — 97530 THERAPEUTIC ACTIVITIES: CPT

## 2018-06-12 PROCEDURE — 6370000000 HC RX 637 (ALT 250 FOR IP): Performed by: ORTHOPAEDIC SURGERY

## 2018-06-12 PROCEDURE — 2580000003 HC RX 258: Performed by: ORTHOPAEDIC SURGERY

## 2018-06-12 PROCEDURE — 97116 GAIT TRAINING THERAPY: CPT

## 2018-06-12 RX ORDER — HYDROCODONE BITARTRATE AND ACETAMINOPHEN 5; 325 MG/1; MG/1
1 TABLET ORAL EVERY 4 HOURS PRN
Qty: 20 TABLET | Refills: 0
Start: 2018-06-12 | End: 2018-06-19

## 2018-06-12 RX ADMIN — VITAMIN D, TAB 1000IU (100/BT) 1000 UNITS: 25 TAB at 07:53

## 2018-06-12 RX ADMIN — CELECOXIB 200 MG: 200 CAPSULE ORAL at 07:53

## 2018-06-12 RX ADMIN — DULOXETINE HYDROCHLORIDE 30 MG: 30 CAPSULE, DELAYED RELEASE ORAL at 00:53

## 2018-06-12 RX ADMIN — HYDROCODONE BITARTRATE AND ACETAMINOPHEN 1 TABLET: 5; 325 TABLET ORAL at 13:49

## 2018-06-12 RX ADMIN — HYDROCODONE BITARTRATE AND ACETAMINOPHEN 2 TABLET: 5; 325 TABLET ORAL at 07:06

## 2018-06-12 RX ADMIN — SODIUM CHLORIDE, POTASSIUM CHLORIDE, SODIUM LACTATE AND CALCIUM CHLORIDE: 600; 310; 30; 20 INJECTION, SOLUTION INTRAVENOUS at 13:49

## 2018-06-12 RX ADMIN — ASPIRIN 325 MG: 325 TABLET, DELAYED RELEASE ORAL at 07:53

## 2018-06-12 RX ADMIN — DOCUSATE SODIUM 100 MG: 100 CAPSULE, LIQUID FILLED ORAL at 07:53

## 2018-06-12 RX ADMIN — Medication 500 MG: at 07:53

## 2018-06-12 RX ADMIN — HYDROCODONE BITARTRATE AND ACETAMINOPHEN 1 TABLET: 5; 325 TABLET ORAL at 17:47

## 2018-06-12 RX ADMIN — SODIUM CHLORIDE, POTASSIUM CHLORIDE, SODIUM LACTATE AND CALCIUM CHLORIDE: 600; 310; 30; 20 INJECTION, SOLUTION INTRAVENOUS at 04:18

## 2018-06-12 ASSESSMENT — PAIN SCALES - GENERAL
PAINLEVEL_OUTOF10: 6
PAINLEVEL_OUTOF10: 2
PAINLEVEL_OUTOF10: 5
PAINLEVEL_OUTOF10: 5
PAINLEVEL_OUTOF10: 4
PAINLEVEL_OUTOF10: 7
PAINLEVEL_OUTOF10: 7

## 2018-06-12 ASSESSMENT — PAIN DESCRIPTION - LOCATION
LOCATION: KNEE

## 2018-06-12 ASSESSMENT — PAIN DESCRIPTION - ORIENTATION
ORIENTATION: LEFT

## 2018-06-12 ASSESSMENT — PAIN DESCRIPTION - ONSET: ONSET: ON-GOING

## 2018-06-12 ASSESSMENT — PAIN DESCRIPTION - PAIN TYPE
TYPE: SURGICAL PAIN
TYPE: SURGICAL PAIN

## 2018-06-12 ASSESSMENT — PAIN DESCRIPTION - FREQUENCY: FREQUENCY: CONTINUOUS

## 2018-06-12 ASSESSMENT — PAIN DESCRIPTION - DESCRIPTORS: DESCRIPTORS: ACHING

## 2018-06-12 ASSESSMENT — PAIN DESCRIPTION - PROGRESSION: CLINICAL_PROGRESSION: NOT CHANGED

## 2020-12-04 ENCOUNTER — ANESTHESIA EVENT (OUTPATIENT)
Dept: OPERATING ROOM | Age: 65
End: 2020-12-04
Payer: COMMERCIAL

## 2020-12-04 ENCOUNTER — HOSPITAL ENCOUNTER (OUTPATIENT)
Dept: PREADMISSION TESTING | Age: 65
Setting detail: SPECIMEN
Discharge: HOME | End: 2020-12-08
Payer: COMMERCIAL

## 2020-12-04 PROCEDURE — U0003 INFECTIOUS AGENT DETECTION BY NUCLEIC ACID (DNA OR RNA); SEVERE ACUTE RESPIRATORY SYNDROME CORONAVIRUS 2 (SARS-COV-2) (CORONAVIRUS DISEASE [COVID-19]), AMPLIFIED PROBE TECHNIQUE, MAKING USE OF HIGH THROUGHPUT TECHNOLOGIES AS DESCRIBED BY CMS-2020-01-R: HCPCS

## 2020-12-05 LAB
SARS-COV-2, RAPID: NORMAL
SARS-COV-2: NORMAL
SARS-COV-2: NOT DETECTED
SOURCE: NORMAL

## 2020-12-06 ENCOUNTER — TELEPHONE (OUTPATIENT)
Dept: PRIMARY CARE CLINIC | Age: 65
End: 2020-12-06

## 2020-12-07 ENCOUNTER — ANESTHESIA (OUTPATIENT)
Dept: OPERATING ROOM | Age: 65
End: 2020-12-07
Payer: COMMERCIAL

## 2020-12-07 ENCOUNTER — HOSPITAL ENCOUNTER (OUTPATIENT)
Age: 65
Setting detail: OUTPATIENT SURGERY
Discharge: HOME | End: 2020-12-07
Attending: INTERNAL MEDICINE | Admitting: INTERNAL MEDICINE
Payer: COMMERCIAL

## 2020-12-07 VITALS
HEART RATE: 58 BPM | RESPIRATION RATE: 16 BRPM | HEIGHT: 61 IN | DIASTOLIC BLOOD PRESSURE: 82 MMHG | WEIGHT: 173 LBS | BODY MASS INDEX: 32.66 KG/M2 | OXYGEN SATURATION: 98 % | SYSTOLIC BLOOD PRESSURE: 136 MMHG | TEMPERATURE: 97.7 F

## 2020-12-07 VITALS — DIASTOLIC BLOOD PRESSURE: 69 MMHG | SYSTOLIC BLOOD PRESSURE: 143 MMHG | OXYGEN SATURATION: 97 %

## 2020-12-07 PROCEDURE — 2500000003 HC RX 250 WO HCPCS: Performed by: NURSE ANESTHETIST, CERTIFIED REGISTERED

## 2020-12-07 PROCEDURE — 3609012400 HC EGD TRANSORAL BIOPSY SINGLE/MULTIPLE: Performed by: INTERNAL MEDICINE

## 2020-12-07 PROCEDURE — 6360000002 HC RX W HCPCS: Performed by: NURSE ANESTHETIST, CERTIFIED REGISTERED

## 2020-12-07 PROCEDURE — 88305 TISSUE EXAM BY PATHOLOGIST: CPT

## 2020-12-07 PROCEDURE — 3700000000 HC ANESTHESIA ATTENDED CARE: Performed by: INTERNAL MEDICINE

## 2020-12-07 PROCEDURE — 7100000010 HC PHASE II RECOVERY - FIRST 15 MIN: Performed by: INTERNAL MEDICINE

## 2020-12-07 PROCEDURE — 2709999900 HC NON-CHARGEABLE SUPPLY: Performed by: INTERNAL MEDICINE

## 2020-12-07 PROCEDURE — 7100000011 HC PHASE II RECOVERY - ADDTL 15 MIN: Performed by: INTERNAL MEDICINE

## 2020-12-07 PROCEDURE — 2580000003 HC RX 258: Performed by: ANESTHESIOLOGY

## 2020-12-07 PROCEDURE — 3700000001 HC ADD 15 MINUTES (ANESTHESIA): Performed by: INTERNAL MEDICINE

## 2020-12-07 RX ORDER — LIDOCAINE HYDROCHLORIDE 10 MG/ML
1 INJECTION, SOLUTION EPIDURAL; INFILTRATION; INTRACAUDAL; PERINEURAL
Status: DISCONTINUED | OUTPATIENT
Start: 2020-12-07 | End: 2020-12-07 | Stop reason: HOSPADM

## 2020-12-07 RX ORDER — PROPOFOL 10 MG/ML
INJECTION, EMULSION INTRAVENOUS PRN
Status: DISCONTINUED | OUTPATIENT
Start: 2020-12-07 | End: 2020-12-07 | Stop reason: SDUPTHER

## 2020-12-07 RX ORDER — SODIUM CHLORIDE 0.9 % (FLUSH) 0.9 %
10 SYRINGE (ML) INJECTION EVERY 12 HOURS SCHEDULED
Status: DISCONTINUED | OUTPATIENT
Start: 2020-12-07 | End: 2020-12-07 | Stop reason: HOSPADM

## 2020-12-07 RX ORDER — SODIUM CHLORIDE 0.9 % (FLUSH) 0.9 %
10 SYRINGE (ML) INJECTION PRN
Status: DISCONTINUED | OUTPATIENT
Start: 2020-12-07 | End: 2020-12-07 | Stop reason: HOSPADM

## 2020-12-07 RX ORDER — SODIUM CHLORIDE 9 MG/ML
INJECTION, SOLUTION INTRAVENOUS CONTINUOUS
Status: DISCONTINUED | OUTPATIENT
Start: 2020-12-08 | End: 2020-12-07

## 2020-12-07 RX ORDER — LIDOCAINE HYDROCHLORIDE 20 MG/ML
INJECTION, SOLUTION EPIDURAL; INFILTRATION; INTRACAUDAL; PERINEURAL PRN
Status: DISCONTINUED | OUTPATIENT
Start: 2020-12-07 | End: 2020-12-07 | Stop reason: SDUPTHER

## 2020-12-07 RX ORDER — SODIUM CHLORIDE, SODIUM LACTATE, POTASSIUM CHLORIDE, CALCIUM CHLORIDE 600; 310; 30; 20 MG/100ML; MG/100ML; MG/100ML; MG/100ML
INJECTION, SOLUTION INTRAVENOUS CONTINUOUS
Status: DISCONTINUED | OUTPATIENT
Start: 2020-12-07 | End: 2020-12-07 | Stop reason: HOSPADM

## 2020-12-07 RX ADMIN — PROPOFOL 50 MG: 10 INJECTION, EMULSION INTRAVENOUS at 10:39

## 2020-12-07 RX ADMIN — PROPOFOL 50 MG: 10 INJECTION, EMULSION INTRAVENOUS at 10:43

## 2020-12-07 RX ADMIN — SODIUM CHLORIDE, POTASSIUM CHLORIDE, SODIUM LACTATE AND CALCIUM CHLORIDE: 600; 310; 30; 20 INJECTION, SOLUTION INTRAVENOUS at 10:13

## 2020-12-07 RX ADMIN — PROPOFOL 20 MG: 10 INJECTION, EMULSION INTRAVENOUS at 10:44

## 2020-12-07 RX ADMIN — LIDOCAINE HYDROCHLORIDE 80 MG: 20 INJECTION, SOLUTION EPIDURAL; INFILTRATION; INTRACAUDAL; PERINEURAL at 10:37

## 2020-12-07 RX ADMIN — PROPOFOL 50 MG: 10 INJECTION, EMULSION INTRAVENOUS at 10:41

## 2020-12-07 RX ADMIN — PROPOFOL 50 MG: 10 INJECTION, EMULSION INTRAVENOUS at 10:37

## 2020-12-07 ASSESSMENT — PULMONARY FUNCTION TESTS
PIF_VALUE: 1
PIF_VALUE: 0
PIF_VALUE: 1
PIF_VALUE: 1
PIF_VALUE: 0
PIF_VALUE: 1
PIF_VALUE: 1

## 2020-12-07 ASSESSMENT — PAIN SCALES - GENERAL
PAINLEVEL_OUTOF10: 0
PAINLEVEL_OUTOF10: 0

## 2020-12-07 ASSESSMENT — PAIN - FUNCTIONAL ASSESSMENT: PAIN_FUNCTIONAL_ASSESSMENT: 0-10

## 2020-12-07 NOTE — OP NOTE
Operative Note      Patient: Cristina Pizarro  YOB: 1955  MRN: [id number]    Date of Procedure: 12/7/2020    Pre-Op Diagnosis: Epigastric abdominal pain    Indication for the procedure: Patient is a pleasant 63years old female who is seen with epigastric pain as well as acid reflux. She is scheduled for EGD for further evaluation. Post-Op Diagnosis: Irregular Z-line, patchy hyperemia of the stomach biopsies were taken to rule out H. pylori. Procedure(s):  EGD BIOPSY    Surgeon(s): Alla Evans MD    Assistant:   * No surgical staff found *    Informed consent: Risks, benefits, and alternatives of the procedure were explained to the patient prior to the procedure. Risks include but not limited to bleeding, perforation, aspiration, allergic reaction to medication or death. Patient was also informed about the risk of missing a lesion. Anesthesia: Monitor Anesthesia Care    Estimated Blood Loss (mL): Minimal    Complications: None    Specimens:   ID Type Source Tests Collected by Time Destination   A : DUODENUM SMALL BOWEL CELIAC  Tissue Duodenum SURGICAL PATHOLOGY Alla Evans MD 12/7/2020 1040    B : STOMACH BX FOR H PYLORI STAIN Tissue Stomach SURGICAL PATHOLOGY Alla Evans MD 12/7/2020 1041    C : DISTAL ESOPHAGUS FOR BARROTTS Tissue Esophagus SURGICAL PATHOLOGY Alla Evans MD 12/7/2020 1042    D : PROXIMAL ESOPHAGUS FOR MICROSCOPIC ESOPHIGITIS Tissue Esophagus SURGICAL PATHOLOGY Alla Evans MD 12/7/2020 1044        Implants:  * No implants in log *      Drains: * No LDAs found *    Findings: 1-irregular Z-line. Biopsies were taken to rule out Murray's esophagus  2-patchy hyperemia of the stomach biopsies were taken to rule out H. Pylori  3-bilious fluid in the stomach    Detailed Description of Procedure:   Patient was placed in the left lateral decubitus position. The well-lubricated Olympus EGD scope was passed through the bite-block was advanced into the esophagus.

## 2020-12-07 NOTE — ANESTHESIA POSTPROCEDURE EVALUATION
Department of Anesthesiology  Postprocedure Note    Patient: Ruben Oakley  MRN: [id number]  YOB: 1955  Date of evaluation: 12/7/2020  Time:  11:57 AM     Procedure Summary     Date:  12/07/20 Room / Location:  [place] [address]    Anesthesia Start:  6261 Anesthesia Stop:  3336    Procedure:  EGD BIOPSY (N/A ) Diagnosis:  (DX REFLUX)    Surgeon:  Cesar Marcano MD Responsible Provider:  Jennifer Mason MD    Anesthesia Type:  MAC ASA Status:  3          Anesthesia Type: MAC    Jason Phase I: Jason Score: 10    Jason Phase II: Jason Score: 5    Last vitals: Reviewed and per EMR flowsheets.        Anesthesia Post Evaluation    Patient location during evaluation: PACU  Complications: no

## 2020-12-07 NOTE — H&P
History and Physical Update    Pt Name: Yasmany Akhtar  MRN: [id number]  YOB: 1955  Date of evaluation: 2020      [x] I have reviewed the hardcopy Gastroenterology Progress Note by Dr Marlin Angela dated 20 labeled in short chart which meets the criteria for an Interval History and Physical note. [x] I have examined  Yasmany Akhtar  There are no changes to the patient who is scheduled for a EGD by Dr Marlin Angela for acid reflux. The patient denies new health changes, fever, chills, wheezing, cough, increased SOB, chest pain, open sores or wounds.  Last ASA 325mg and Celebrex 20     Past Medical History:     Past Medical History:   Diagnosis Date    Anxiety     Cancer [facility])     right breast x 2, salivary gland    Cerebral artery occlusion with cerebral infarction ([address]     Depression     GERD (gastroesophageal reflux disease)     Hyperlipidemia     Hypertension     \"sometimes have high bloodpressure\" no treatment    Osteoarthritis     Peripheral vascular disease ([address]     PONV (postoperative nausea and vomiting)     Shortness of breath     cannot climb 1 flight of stairs without becoming SOB, can do her own housework but she needs to rest during    Snores     Urinary frequency         Past Surgical History:     Past Surgical History:   Procedure Laterality Date    BREAST RECONSTRUCTION Right 2014    BREAST SURGERY Right     breast lumpectomy with chemo and radiation    BUNIONECTOMY Left 2015    BUNIONECTOMY Right 10/10/2016    right foot bunionectomy     SECTION      X 1    COLONOSCOPY      EYE SURGERY      cataract extraction    FOOT SURGERY Bilateral     JOINT REPLACEMENT Left 2018    knee    LIPOMA RESECTION Right     benign mass removed behind right ear    MASTECTOMY Right 2014    skin sparing with sentinel node biopsy and reconstruction with tissue expander implant    NOSE SURGERY      [place] TOTAL KNEE ARTHROPLASTY Left 6/11/2018    ROBOTIC LEFT KNEE TOTAL ARTHROPLASTY performed by Kieran Giron MD at [address] [facility]      for cancer    VARICOSE VEIN SURGERY Bilateral     WRIST FRACTURE SURGERY Right 09/21/2016    [facility]  [facility]        Social History:     Tobacco:    reports that she quit smoking about 34 years ago. Her smoking use included cigarettes. She has never used smokeless tobacco.  Alcohol:      reports no history of alcohol use. Drug Use:  reports no history of drug use. Family History:     Family History   Problem Relation Age of Onset    Cancer Father     Diabetes Brother      Vital signs: /80   Pulse 58   Resp 18   Ht 5' 1\" (1.549 m)   Wt 173 lb (78.5 kg)   LMP 10/06/2006 (LMP Unknown)   SpO2 95%   BMI 32.69 kg/m²     Allergies:  Latex; Fluoxetine; Oxycodone-acetaminophen; Oxycodone-acetaminophen; and Tape [adhesive tape]    Medications:    Prior to Admission medications    Medication Sig Start Date End Date Taking? Authorizing Provider   aspirin 325 MG EC tablet Take 1 tablet by mouth 2 times daily 6/12/18  Yes Kieran Giron MD   vitamin C (ASCORBIC ACID) 500 MG tablet Take 500 mg by mouth daily   Yes Historical Provider, MD   buprenorphine (BUPRENEX) 15 MCG/HR PTWK Place 1 patch onto the skin once a week. .   Yes Historical Provider, MD   Mirabegron ER (MYRBETRIQ) 50 MG TB24 Take 1 tablet by mouth nightly    Yes Historical Provider, MD   metoprolol succinate (TOPROL XL) 25 MG extended release tablet Take 25 mg by mouth nightly    Yes Historical Provider, MD   pantoprazole (PROTONIX) 40 MG tablet Take 40 mg by mouth nightly    Yes Historical Provider, MD   DULoxetine (CYMBALTA) 30 MG extended release capsule Take 30 mg by mouth nightly   Yes Historical Provider, MD   gabapentin (NEURONTIN) 100 MG capsule Take 100 mg by mouth nightly. .   Yes Historical Provider, MD   tiZANidine (ZANAFLEX) 4 MG tablet Take 4 mg by mouth nightly   Yes Historical Provider, MD   atorvastatin

## 2020-12-07 NOTE — ANESTHESIA PRE PROCEDURE
Department of Anesthesiology  Preprocedure Note       Name:  Ailyn Guaman   Age:  63 y.o.  :  1955                                          MRN:  [id number]         Date:  2020      Surgeon: Fadumo Short): Patti Garay MD    Procedure: Procedure(s):  EGD ESOPHAGOGASTRODUODENOSCOPY    Medications prior to admission:   Prior to Admission medications    Medication Sig Start Date End Date Taking? Authorizing Provider   aspirin 325 MG EC tablet Take 1 tablet by mouth 2 times daily 18   Teresa Sebastian MD   vitamin C (ASCORBIC ACID) 500 MG tablet Take 500 mg by mouth daily    Historical Provider, MD   buprenorphine (BUPRENEX) 15 MCG/HR PTWK Place 1 patch onto the skin once a week. [place] Historical Provider, MD   Mirabegron ER (MYRBETRIQ) 50 MG TB24 Take 1 tablet by mouth nightly     Historical Provider, MD   metoprolol succinate (TOPROL XL) 25 MG extended release tablet Take 25 mg by mouth nightly     Historical Provider, MD   pantoprazole (PROTONIX) 40 MG tablet Take 40 mg by mouth nightly     Historical Provider, MD   DULoxetine (CYMBALTA) 30 MG extended release capsule Take 30 mg by mouth nightly    Historical Provider, MD   gabapentin (NEURONTIN) 100 MG capsule Take 100 mg by mouth nightly. [place]     Historical Provider, MD   tiZANidine (ZANAFLEX) 4 MG tablet Take 4 mg by mouth nightly    Historical Provider, MD   atorvastatin (LIPITOR) 10 MG tablet Take 10 mg by mouth    Historical Provider, MD   RESTASIS 0.05 % ophthalmic emulsion INSTILL ONE DROP INTO BOTH EYES TWICE DAILY 16   Historical Provider, MD   ALPRAZolam Carlynn Remak) 0.5 MG tablet Take 0.5 mg by mouth nightly    Historical Provider, MD   celecoxib (CELEBREX) 200 MG capsule Take 200 mg by mouth 2 times daily  8/12/15   Historical Provider, MD   letrozole [facility]) 2.5 MG tablet Take 2.5 mg by mouth nightly  4/20/15   Historical Provider, MD BURKETT VITAMIN D-3 1000 UNITS TABS tablet   Take 1,000 Units by mouth 2 times daily  4/8/15   Historical Provider, MD       Current medications:    Current Facility-Administered Medications   Medication Dose Route Frequency Provider Last Rate Last Dose    [START ON 12/8/2020] 0.9 % sodium chloride infusion   Intravenous Continuous Tristan Adams DO        [START ON 12/8/2020] lactated ringers infusion   Intravenous Continuous Tristan Adams, DO        sodium chloride flush 0.9 % injection 10 mL  10 mL Intravenous 2 times per day Tristanwalter Adams, DO        sodium chloride flush 0.9 % injection 10 mL  10 mL Intravenous PRN Cori Washburn DO        [START ON 12/8/2020] lidocaine PF 1 % injection 1 mL  1 mL Intradermal Once PRN Cori Washburn DO           Allergies: Allergies   Allergen Reactions    Latex Rash     Developed moderate desquamating erythematous rash from tape on skin at time of right mastectomy 12/2014. (Pr orthopedic office patient has a latex allergy June 2018)    Fluoxetine Other (See Comments)     More depressed    Oxycodone-Acetaminophen Other (See Comments)     Patient unsure    Oxycodone-Acetaminophen Other (See Comments)     Patient unsure    Tape Frazee Richie Tape]        Problem List:    Patient Active Problem List   Diagnosis Code    Fracture T14. 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    Primary osteoarthritis of left knee M17.12       Past Medical History:        Diagnosis Date    Anxiety     Cancer ([facility] 2014    right breast x 2, salivary gland    Depression     GERD (gastroesophageal reflux disease)     Hyperlipidemia     Hypertension     \"sometimes have high bloodpressure\" no treatment    Osteoarthritis     Peripheral vascular disease ([facility]     PONV (postoperative nausea and vomiting)     Shortness of breath     cannot climb 1 flight of stairs without becoming SOB, can do her own housework but she needs to rest during    Snores     Urinary frequency        Past Surgical History:        Procedure Laterality Date    BREAST RECONSTRUCTION Right 2014    BREAST SURGERY Right     breast lumpectomy with chemo and radiation    BUNIONECTOMY Left 2015    BUNIONECTOMY Right 10/10/2016    right foot bunionectomy     SECTION      X 1    COLONOSCOPY      EYE SURGERY      cataract extraction    FOOT SURGERY Bilateral     JOINT REPLACEMENT Left 2018    knee    LIPOMA RESECTION Right     benign mass removed behind right ear    MASTECTOMY Right 2014    skin sparing with sentinel node biopsy and reconstruction with tissue expander implant    NOSE SURGERY      [place] TOTAL KNEE ARTHROPLASTY Left 2018    ROBOTIC LEFT KNEE TOTAL ARTHROPLASTY performed by Asif Meza MD at [address] [facility]      for cancer   [address] Bilateral     WRIST FRACTURE SURGERY Right 2016    [facility]       Social History:    Social History     Tobacco Use    Smoking status: Former Smoker     Types: Cigarettes     Last attempt to quit: 10/6/1986     Years since quittin.1    Smokeless tobacco: Never Used   Substance Use Topics    Alcohol use:  No                                Counseling given: Not Answered      Vital Signs (Current):   Vitals:    20 0928   BP: 136/80   Pulse: 58   Resp: 18   SpO2: 95%                                              BP Readings from Last 3 Encounters:   20 136/80   18 131/68   18 133/66       NPO Status:                                                                                 BMI:   Wt Readings from Last 3 Encounters:   18 166 lb 10.7 oz (75.6 kg)   18 166 lb 10.7 oz (75.6 kg)   17 140 lb (63.5 kg)     There is no height or weight on file to calculate BMI.    CBC:   Lab Results   Component Value Date    WBC 5.5 05/24/2018    RBC 5.25 05/24/2018    RBC 4.33 12/09/2011    HGB 15.2 05/24/2018    HCT 45.6 05/24/2018    MCV 86.9 05/24/2018    RDW 13.6 05/24/2018     05/24/2018     12/09/2011       CMP:   Lab Results   Component Value Date     05/24/2018    K 4.7 05/24/2018     05/24/2018    CO2 26 05/24/2018    BUN 17 05/24/2018    CREATININE 0.65 05/24/2018    GFRAA >60 05/24/2018    LABGLOM >60 05/24/2018    GLUCOSE 99 05/24/2018    PROT 7.8 05/24/2018    CALCIUM 9.1 05/24/2018    BILITOT 0.46 05/24/2018    ALKPHOS 88 05/24/2018    AST 17 05/24/2018    ALT 20 05/24/2018       POC Tests: No results for input(s): POCGLU, POCNA, POCK, POCCL, POCBUN, POCHEMO, POCHCT in the last 72 hours. Coags:   Lab Results   Component Value Date    PROTIME 9.8 11/21/2014    INR 0.9 11/21/2014    APTT 26.4 11/21/2014       HCG (If Applicable): No results found for: PREGTESTUR, PREGSERUM, HCG, HCGQUANT     ABGs: No results found for: PHART, PO2ART, DOC6BTA, YHD2YII, BEART, C6WRVCZF     Type & Screen (If Applicable):  No results found for: LABABO, LABRH    Drug/Infectious Status (If Applicable):  No results found for: HIV, HEPCAB    COVID-19 Screening (If Applicable):   Lab Results   Component Value Date    COVID19 Not Detected 12/04/2020         Anesthesia Evaluation     history of anesthetic complications: PONV. Airway: Mallampati: I  TM distance: >3 FB   Neck ROM: full  Mouth opening: > = 3 FB Dental:          Pulmonary:                              Cardiovascular:    (+) hypertension:, UMAÑA:, hyperlipidemia    (-)  angina                Neuro/Psych:   (+) CVA: residual symptoms,             GI/Hepatic/Renal:   (+) GERD:,           Endo/Other:                     Abdominal:           Vascular:                                      Anesthesia Plan      MAC     ASA 3       Induction: intravenous. Anesthetic plan and risks discussed with patient.                       Freddy Beltran MD   12/7/2020

## 2020-12-08 LAB — SURGICAL PATHOLOGY REPORT: NORMAL

## 2020-12-27 ENCOUNTER — HOSPITAL ENCOUNTER (OUTPATIENT)
Dept: LAB | Age: 65
Setting detail: SPECIMEN
Discharge: HOME | End: 2020-12-27
Payer: COMMERCIAL

## 2020-12-29 ENCOUNTER — HOSPITAL ENCOUNTER (OUTPATIENT)
Dept: LAB | Age: 65
Setting detail: SPECIMEN
Discharge: HOME | End: 2020-12-29
Payer: COMMERCIAL

## 2020-12-29 PROCEDURE — U0003 INFECTIOUS AGENT DETECTION BY NUCLEIC ACID (DNA OR RNA); SEVERE ACUTE RESPIRATORY SYNDROME CORONAVIRUS 2 (SARS-COV-2) (CORONAVIRUS DISEASE [COVID-19]), AMPLIFIED PROBE TECHNIQUE, MAKING USE OF HIGH THROUGHPUT TECHNOLOGIES AS DESCRIBED BY CMS-2020-01-R: HCPCS

## 2020-12-30 LAB
SARS-COV-2, RAPID: NORMAL
SARS-COV-2: NORMAL
SARS-COV-2: NOT DETECTED
SOURCE: NORMAL

## 2020-12-31 ENCOUNTER — ANESTHESIA EVENT (OUTPATIENT)
Dept: OPERATING ROOM | Age: 65
End: 2020-12-31
Payer: COMMERCIAL

## 2020-12-31 ENCOUNTER — HOSPITAL ENCOUNTER (OUTPATIENT)
Age: 65
Setting detail: OUTPATIENT SURGERY
Discharge: HOME | End: 2020-12-31
Attending: INTERNAL MEDICINE | Admitting: INTERNAL MEDICINE
Payer: COMMERCIAL

## 2020-12-31 ENCOUNTER — ANESTHESIA (OUTPATIENT)
Dept: OPERATING ROOM | Age: 65
End: 2020-12-31
Payer: COMMERCIAL

## 2020-12-31 VITALS
TEMPERATURE: 97.5 F | DIASTOLIC BLOOD PRESSURE: 74 MMHG | OXYGEN SATURATION: 95 % | RESPIRATION RATE: 17 BRPM | HEART RATE: 63 BPM | SYSTOLIC BLOOD PRESSURE: 123 MMHG

## 2020-12-31 VITALS
SYSTOLIC BLOOD PRESSURE: 123 MMHG | RESPIRATION RATE: 19 BRPM | OXYGEN SATURATION: 95 % | DIASTOLIC BLOOD PRESSURE: 60 MMHG

## 2020-12-31 PROCEDURE — 2500000003 HC RX 250 WO HCPCS: Performed by: NURSE ANESTHETIST, CERTIFIED REGISTERED

## 2020-12-31 PROCEDURE — 2709999900 HC NON-CHARGEABLE SUPPLY: Performed by: INTERNAL MEDICINE

## 2020-12-31 PROCEDURE — 88305 TISSUE EXAM BY PATHOLOGIST: CPT

## 2020-12-31 PROCEDURE — 3700000001 HC ADD 15 MINUTES (ANESTHESIA): Performed by: INTERNAL MEDICINE

## 2020-12-31 PROCEDURE — 3609010600 HC COLONOSCOPY POLYPECTOMY SNARE/COLD BIOPSY: Performed by: INTERNAL MEDICINE

## 2020-12-31 PROCEDURE — 7100000010 HC PHASE II RECOVERY - FIRST 15 MIN: Performed by: INTERNAL MEDICINE

## 2020-12-31 PROCEDURE — 2580000003 HC RX 258: Performed by: ANESTHESIOLOGY

## 2020-12-31 PROCEDURE — 6360000002 HC RX W HCPCS: Performed by: NURSE ANESTHETIST, CERTIFIED REGISTERED

## 2020-12-31 PROCEDURE — 3700000000 HC ANESTHESIA ATTENDED CARE: Performed by: INTERNAL MEDICINE

## 2020-12-31 PROCEDURE — 7100000011 HC PHASE II RECOVERY - ADDTL 15 MIN: Performed by: INTERNAL MEDICINE

## 2020-12-31 RX ORDER — SODIUM CHLORIDE 9 MG/ML
INJECTION, SOLUTION INTRAVENOUS CONTINUOUS
Status: DISCONTINUED | OUTPATIENT
Start: 2020-12-31 | End: 2020-12-31

## 2020-12-31 RX ORDER — SODIUM CHLORIDE 0.9 % (FLUSH) 0.9 %
10 SYRINGE (ML) INJECTION PRN
Status: DISCONTINUED | OUTPATIENT
Start: 2020-12-31 | End: 2020-12-31 | Stop reason: HOSPADM

## 2020-12-31 RX ORDER — MIDAZOLAM HYDROCHLORIDE 1 MG/ML
INJECTION INTRAMUSCULAR; INTRAVENOUS PRN
Status: DISCONTINUED | OUTPATIENT
Start: 2020-12-31 | End: 2020-12-31 | Stop reason: SDUPTHER

## 2020-12-31 RX ORDER — LIDOCAINE HYDROCHLORIDE 10 MG/ML
1 INJECTION, SOLUTION EPIDURAL; INFILTRATION; INTRACAUDAL; PERINEURAL
Status: DISCONTINUED | OUTPATIENT
Start: 2020-12-31 | End: 2020-12-31 | Stop reason: HOSPADM

## 2020-12-31 RX ORDER — SODIUM CHLORIDE 0.9 % (FLUSH) 0.9 %
10 SYRINGE (ML) INJECTION EVERY 12 HOURS SCHEDULED
Status: DISCONTINUED | OUTPATIENT
Start: 2020-12-31 | End: 2020-12-31 | Stop reason: HOSPADM

## 2020-12-31 RX ORDER — PROPOFOL 10 MG/ML
INJECTION, EMULSION INTRAVENOUS PRN
Status: DISCONTINUED | OUTPATIENT
Start: 2020-12-31 | End: 2020-12-31 | Stop reason: SDUPTHER

## 2020-12-31 RX ORDER — LIDOCAINE HYDROCHLORIDE 20 MG/ML
INJECTION, SOLUTION INFILTRATION; PERINEURAL PRN
Status: DISCONTINUED | OUTPATIENT
Start: 2020-12-31 | End: 2020-12-31 | Stop reason: SDUPTHER

## 2020-12-31 RX ORDER — SODIUM CHLORIDE, SODIUM LACTATE, POTASSIUM CHLORIDE, CALCIUM CHLORIDE 600; 310; 30; 20 MG/100ML; MG/100ML; MG/100ML; MG/100ML
INJECTION, SOLUTION INTRAVENOUS CONTINUOUS
Status: DISCONTINUED | OUTPATIENT
Start: 2020-12-31 | End: 2020-12-31 | Stop reason: HOSPADM

## 2020-12-31 RX ADMIN — MIDAZOLAM 2 MG: 1 INJECTION INTRAMUSCULAR; INTRAVENOUS at 09:21

## 2020-12-31 RX ADMIN — LIDOCAINE HYDROCHLORIDE 50 MG: 20 INJECTION, SOLUTION INFILTRATION; PERINEURAL at 09:19

## 2020-12-31 RX ADMIN — PROPOFOL 50 MG: 10 INJECTION, EMULSION INTRAVENOUS at 09:32

## 2020-12-31 RX ADMIN — PROPOFOL 50 MG: 10 INJECTION, EMULSION INTRAVENOUS at 09:19

## 2020-12-31 RX ADMIN — SODIUM CHLORIDE, POTASSIUM CHLORIDE, SODIUM LACTATE AND CALCIUM CHLORIDE: 600; 310; 30; 20 INJECTION, SOLUTION INTRAVENOUS at 08:22

## 2020-12-31 RX ADMIN — PROPOFOL 50 MG: 10 INJECTION, EMULSION INTRAVENOUS at 09:37

## 2020-12-31 RX ADMIN — PROPOFOL 50 MG: 10 INJECTION, EMULSION INTRAVENOUS at 09:20

## 2020-12-31 RX ADMIN — PROPOFOL 30 MG: 10 INJECTION, EMULSION INTRAVENOUS at 09:21

## 2020-12-31 RX ADMIN — PROPOFOL 30 MG: 10 INJECTION, EMULSION INTRAVENOUS at 09:43

## 2020-12-31 RX ADMIN — PROPOFOL 50 MG: 10 INJECTION, EMULSION INTRAVENOUS at 09:25

## 2020-12-31 ASSESSMENT — PULMONARY FUNCTION TESTS
PIF_VALUE: 0

## 2020-12-31 ASSESSMENT — PAIN SCALES - GENERAL
PAINLEVEL_OUTOF10: 0
PAINLEVEL_OUTOF10: 0

## 2020-12-31 NOTE — H&P
History and Physical Update    Pt Name: Genaro Turner  MRN: [id number]  YOB: 1955  Date of evaluation: 2020      [x] I have reviewed the hardcopy Gastroenterology Progress Note by Dr Orlando Vieira dated 20 labeled in short chart which meets the criteria for an Interval History and Physical note. [x] I have examined  Genaro Turner  There are no changes to the patient who is scheduled for a colorectal cancer screening by Dr Corin Welsh for colon screening. Followed the prep until watery clear. No FH colon cancer  Previous colonoscopy >5 years  The patient denies bloody tarry stools, bowel changes, abdominal pain, unintentional weight less, fever, chills, wheezing, cough, increased SOB, chest pain, open sores or wounds.   Last Celebrex 20     Past Medical History:     Past Medical History:   Diagnosis Date    Anxiety     Cancer [facility])     right breast x 2, salivary gland    Cerebral artery occlusion with cerebral infarction ([address]     Depression     GERD (gastroesophageal reflux disease)     Hyperlipidemia     Hypertension     \"sometimes have high bloodpressure\" no treatment    Osteoarthritis     Peripheral vascular disease ([address]     PONV (postoperative nausea and vomiting)     Shortness of breath     cannot climb 1 flight of stairs without becoming SOB, can do her own housework but she needs to rest during    Snores     Urinary frequency         Past Surgical History:     Past Surgical History:   Procedure Laterality Date    BREAST RECONSTRUCTION Right 2014    BREAST SURGERY Right     breast lumpectomy with chemo and radiation    BUNIONECTOMY Left 2015    BUNIONECTOMY Right 10/10/2016    right foot bunionectomy     SECTION      X 1    COLONOSCOPY      EYE SURGERY      cataract extraction    FOOT SURGERY Bilateral     JOINT REPLACEMENT Left 2018    knee    LIPOMA RESECTION Right     benign mass removed behind right ear    MASTECTOMY Right 12/5/2014    skin sparing with sentinel node biopsy and reconstruction with tissue expander implant    NOSE SURGERY      [place] TOTAL KNEE ARTHROPLASTY Left 6/11/2018    ROBOTIC LEFT KNEE TOTAL ARTHROPLASTY performed by Agustin Carlson MD at [address] [facility]      for cancer    UPPER GASTROINTESTINAL ENDOSCOPY N/A 12/7/2020    EGD BIOPSY performed by True Harden MD at [address] [facility] [address] Bilateral    [address] Right 09/21/2016    [facility]        Social History:     Tobacco:    reports that she quit smoking about 34 years ago. Her smoking use included cigarettes. She has never used smokeless tobacco.  Alcohol:      reports no history of alcohol use. Drug Use:  reports no history of drug use. Family History:     Family History   Problem Relation Age of Onset    Cancer Father     Diabetes Brother        Vital signs: /73   Pulse 62   Temp 96.5 °F (35.8 °C) (Temporal)   Resp 16   LMP 10/06/2006 (LMP Unknown)   SpO2 95%     Allergies:  Latex, Fluoxetine, Oxycodone-acetaminophen, Oxycodone-acetaminophen, and Tape [adhesive tape]    Medications:    Prior to Admission medications    Medication Sig Start Date End Date Taking? Authorizing Provider   vitamin C (ASCORBIC ACID) 500 MG tablet Take 500 mg by mouth daily   Yes Historical Provider, MD   buprenorphine (BUPRENEX) 15 MCG/HR PTWK Place 1 patch onto the skin once a week. .   Yes Historical Provider, MD   Mirabegron ER (MYRBETRIQ) 50 MG TB24 Take 1 tablet by mouth nightly    Yes Historical Provider, MD   metoprolol succinate (TOPROL XL) 25 MG extended release tablet Take 25 mg by mouth nightly    Yes Historical Provider, MD   pantoprazole (PROTONIX) 40 MG tablet Take 40 mg by mouth nightly    Yes Historical Provider, MD   DULoxetine (CYMBALTA) 30 MG extended release capsule Take 30 mg by mouth nightly   Yes Historical Provider, MD   gabapentin (NEURONTIN) 100 MG capsule Take 100 mg by mouth nightly. Krista Donis

## 2020-12-31 NOTE — ANESTHESIA PRE PROCEDURE
Department of Anesthesiology  Preprocedure Note       Name:  Gallo Osorio   Age:  63 y.o.  :  1955                                          MRN:  [id number]         Date:  2020      Surgeon: Teresa Rodriguez): Trinh Ramírez MD    Procedure: Procedure(s):  COLORECTAL CANCER SCREENING, NOT HIGH RISK    Medications prior to admission:   Prior to Admission medications    Medication Sig Start Date End Date Taking? Authorizing Provider   vitamin C (ASCORBIC ACID) 500 MG tablet Take 500 mg by mouth daily   Yes Historical Provider, MD   buprenorphine (BUPRENEX) 15 MCG/HR PTWK Place 1 patch onto the skin once a week. .   Yes Historical Provider, MD   Mirabegron ER (MYRBETRIQ) 50 MG TB24 Take 1 tablet by mouth nightly    Yes Historical Provider, MD   metoprolol succinate (TOPROL XL) 25 MG extended release tablet Take 25 mg by mouth nightly    Yes Historical Provider, MD   pantoprazole (PROTONIX) 40 MG tablet Take 40 mg by mouth nightly    Yes Historical Provider, MD   DULoxetine (CYMBALTA) 30 MG extended release capsule Take 30 mg by mouth nightly   Yes Historical Provider, MD   gabapentin (NEURONTIN) 100 MG capsule Take 100 mg by mouth nightly. .   Yes Historical Provider, MD   tiZANidine (ZANAFLEX) 4 MG tablet Take 4 mg by mouth nightly   Yes Historical Provider, MD   atorvastatin (LIPITOR) 10 MG tablet Take 10 mg by mouth   Yes Historical Provider, MD   RESTASIS 0.05 % ophthalmic emulsion INSTILL ONE DROP INTO BOTH EYES TWICE DAILY 16  Yes Historical Provider, MD   ALPRAZolam [facility]) 0.5 MG tablet Take 0.5 mg by mouth nightly   Yes Historical Provider, MD   celecoxib (CELEBREX) 200 MG capsule Take 200 mg by mouth 2 times daily  8/12/15  Yes Historical Provider, MD BURKETT VITAMIN D-3 1000 UNITS TABS tablet   Take 1,000 Units by mouth 2 times daily  4/8/15  Yes Historical Provider, MD   aspirin 325 MG EC tablet Take 1 tablet by mouth 2 times daily Patient not taking: Reported on 12/31/2020 6/12/18   Teresa Sebastian MD   letrozole [facility]) 2.5 MG tablet Take 2.5 mg by mouth nightly Pt not taking 4/20/15   Historical Provider, MD       Current medications:    Current Facility-Administered Medications   Medication Dose Route Frequency Provider Last Rate Last Admin    lactated ringers infusion   Intravenous Continuous Lilia Leach  mL/hr at 12/31/20 0822 New Bag at 12/31/20 0822    sodium chloride flush 0.9 % injection 10 mL  10 mL Intravenous 2 times per day Lilia Leach MD        sodium chloride flush 0.9 % injection 10 mL  10 mL Intravenous PRN Lilia Leach MD        lidocaine PF 1 % injection 1 mL  1 mL Intradermal Once PRN Lilia Leach MD           Allergies: Allergies   Allergen Reactions    Latex Rash     Developed moderate desquamating erythematous rash from tape on skin at time of right mastectomy 12/2014. (Pr orthopedic office patient has a latex allergy June 2018)    Fluoxetine Other (See Comments)     More depressed    Oxycodone-Acetaminophen Other (See Comments)     Patient unsure    Oxycodone-Acetaminophen Other (See Comments)     Patient unsure    Tape Lindia Cluck Tape]        Problem List:    Patient Active Problem List   Diagnosis Code    Fracture T14. 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    Primary osteoarthritis of left knee M17.12       Past Medical History:        Diagnosis Date    Anxiety     Cancer ([facility]) 2014    right breast x 2, salivary gland    Cerebral artery occlusion with cerebral infarction ([facility])     Depression     GERD (gastroesophageal reflux disease)     Hyperlipidemia     Hypertension     \"sometimes have high bloodpressure\" no treatment    Osteoarthritis  Peripheral vascular disease (HCC)     PONV (postoperative nausea and vomiting)     Shortness of breath     cannot climb 1 flight of stairs without becoming SOB, can do her own housework but she needs to rest during    Snores     Urinary frequency        Past Surgical History:        Procedure Laterality Date    BREAST RECONSTRUCTION Right 2014    BREAST SURGERY Right     breast lumpectomy with chemo and radiation    BUNIONECTOMY Left 2015    BUNIONECTOMY Right 10/10/2016    right foot bunionectomy     SECTION      X 1    COLONOSCOPY      EYE SURGERY      cataract extraction    FOOT SURGERY Bilateral     JOINT REPLACEMENT Left 2018    knee    LIPOMA RESECTION Right     benign mass removed behind right ear    MASTECTOMY Right 2014    skin sparing with sentinel node biopsy and reconstruction with tissue expander implant    NOSE SURGERY      TX TOTAL KNEE ARTHROPLASTY Left 2018    ROBOTIC LEFT KNEE TOTAL ARTHROPLASTY performed by Alyssa Huffman MD at [address] [place] [address]      for cancer    UPPER GASTROINTESTINAL ENDOSCOPY N/A 2020    EGD BIOPSY performed by Wai Leal MD at [address] [place] [address] Ne Bilateral    [address] Right 2016    [facility]       Social History:    Social History     Tobacco Use    Smoking status: Former Smoker     Types: Cigarettes     Quit date: 10/6/1986     Years since quittin.2    Smokeless tobacco: Never Used   Substance Use Topics    Alcohol use:  No                                Counseling given: Not Answered      Vital Signs (Current):   Vitals:    20 0745   BP: 136/73   Pulse: 62   Resp: 16   Temp: 96.5 °F (35.8 °C)   TempSrc: Temporal   SpO2: 95%                                              BP Readings from Last 3 Encounters:   20 136/73   20 (!) 143/69   20 136/82       NPO Status: Time of last liquid consumption: 0 Time of last solid consumption: 1530                        Date of last liquid consumption: 12/30/20                        Date of last solid food consumption: 12/23/20    BMI:   Wt Readings from Last 3 Encounters:   12/07/20 173 lb (78.5 kg)   06/11/18 166 lb 10.7 oz (75.6 kg)   05/24/18 166 lb 10.7 oz (75.6 kg)     There is no height or weight on file to calculate BMI.    CBC:   Lab Results   Component Value Date    WBC 5.5 05/24/2018    RBC 5.25 05/24/2018    RBC 4.33 12/09/2011    HGB 15.2 05/24/2018    HCT 45.6 05/24/2018    MCV 86.9 05/24/2018    RDW 13.6 05/24/2018     05/24/2018     12/09/2011       CMP:   Lab Results   Component Value Date     05/24/2018    K 4.7 05/24/2018     05/24/2018    CO2 26 05/24/2018    BUN 17 05/24/2018    CREATININE 0.65 05/24/2018    GFRAA >60 05/24/2018    LABGLOM >60 05/24/2018    GLUCOSE 99 05/24/2018    PROT 7.8 05/24/2018    CALCIUM 9.1 05/24/2018    BILITOT 0.46 05/24/2018    ALKPHOS 88 05/24/2018    AST 17 05/24/2018    ALT 20 05/24/2018       POC Tests: No results for input(s): POCGLU, POCNA, POCK, POCCL, POCBUN, POCHEMO, POCHCT in the last 72 hours. Coags:   Lab Results   Component Value Date    PROTIME 9.8 11/21/2014    INR 0.9 11/21/2014    APTT 26.4 11/21/2014       HCG (If Applicable): No results found for: PREGTESTUR, PREGSERUM, HCG, HCGQUANT     ABGs: No results found for: PHART, PO2ART, VIV0THM, NWM6VZA, BEART, F6IHPJBX     Type & Screen (If Applicable):  No results found for: LABABO, LABRH    Drug/Infectious Status (If Applicable):  No results found for: HIV, HEPCAB    COVID-19 Screening (If Applicable):   Lab Results   Component Value Date    COVID19 Not Detected 12/29/2020         Anesthesia Evaluation     history of anesthetic complications: PONV.   Airway: Mallampati: I  TM distance: >3 FB   Neck ROM: full  Mouth opening: > = 3 FB Dental:          Pulmonary:                              Cardiovascular: (+) hypertension:, hyperlipidemia    (-)  angina and  UMAÑA                Neuro/Psych:   (+) CVA: no interval change,             GI/Hepatic/Renal:   (+) GERD:,           Endo/Other:                     Abdominal:           Vascular:                                      Anesthesia Plan      MAC     ASA 3       Induction: intravenous. Anesthetic plan and risks discussed with patient.                       Jessica Lopez MD   12/31/2020

## 2020-12-31 NOTE — ANESTHESIA POSTPROCEDURE EVALUATION
Department of Anesthesiology  Postprocedure Note    Patient: Yasmany Akhtar  MRN: [id number]  YOB: 1955  Date of evaluation: 12/31/2020  Time:  10:27 AM     Procedure Summary     Date: 12/31/20 Room / Location: [place] [address] / [facility] - INPATIENT    Anesthesia Start: 6693 Anesthesia Stop: 6958    Procedure: COLONOSCOPY POLYPECTOMY SNARE/COLD BIOPSY (N/A ) Diagnosis: (DX SCREENING)    Surgeons: Marlin Angela MD Responsible Provider: Tino Carolina MD    Anesthesia Type: MAC ASA Status: 3          Anesthesia Type: MAC    Jason Phase I: Jason Score: 10    Jason Phase II: Jason Score: 7    Last vitals: Reviewed and per EMR flowsheets.        Anesthesia Post Evaluation    Patient location during evaluation: PACU  Complications: no

## 2020-12-31 NOTE — OP NOTE
Operative Note      Patient: Dock Cancer  YOB: 1955  MRN: [id number]    Date of Procedure: 12/31/2020    Pre-Op Diagnosis: Abdominal pain    Indication for the procedure patient is a pleasant 63years old female was been complaining of generalized abdominal pain. She had EGD that did not explain the source of her pain and she is scheduled for colonoscopy for further evaluation    Post-Op Diagnosis: Colon polyps and internal hemorrhoids  Procedure(s):  COLONOSCOPY POLYPECTOMY SNARE/COLD BIOPSY    Surgeon(s): Jimbo Marcelino MD    Assistant:   * No surgical staff found *    Informed consent: Risks, benefits, and alternatives of the procedure were explained to the patient prior to the procedure. Risks include but not limited to bleeding, perforation, aspiration, allergic reaction to medication or death. Patient was also informed about the risk of missing a lesion. Anesthesia: Monitor Anesthesia Care    Estimated Blood Loss (mL): Minimal    Complications: None    Specimens:   ID Type Source Tests Collected by Time Destination   A : biopsy transverse colon polyp Tissue Colon SURGICAL PATHOLOGY Jimbo Marcelino MD 12/31/2020 7587    B : random terminal illeum biopsy Tissue Colon SURGICAL PATHOLOGY Jimbo Marcelino MD 12/31/2020 9153    C : DESCENDING COLON POLYP Tissue Colon SURGICAL PATHOLOGY Jimbo Marcelino MD 12/31/2020 6720    D : RANDOM COLON Tissue Colon SURGICAL PATHOLOGY Jimbo Marcelino MD 12/31/2020 8946        Implants:  * No implants in log *      Drains: * No LDAs found *    Findings: 1-internal hemorrhoids  2-3 sessile polyps in the transverse colon that measured 4 to 5 mm and they were removed with biopsy  3-6 mm sessile polyp in the transverse colon that was removed by cold snare polypectomy  4-3 sessile polyps in the descending colon that measured 4 to 5 mm were removed with biopsy. Detailed Description of Procedure:   Patient was placed in the left lateral decubitus position.   Initially digital rectal exam was performed. No mass was appreciated. The well-lubricated Olympus colonoscope was advanced through the rectum into the sigmoid colon up to the cecum. There was retained stool within the cecum. Part of the cecum was could not be visualized very well. The terminal ileum was intubated and appeared normal.  Biopsies were taken. The scope was then brought back into the cecum. What appeared of the cecal mucosa appeared normal.  Ascending colon mucosa appeared normal.  In the transverse colon there was 4 polyps. 3 were sessile and measured 4 to 5 mm removed biopsy. The other 1 measured 6 mm and was removed by cold snare polypectomy. The scope was then brought back into the descending colon. In the descending colon there was 3 sessile polyp that measured 4 to 5 mm removed by biopsy. The scope was then brought back into the sigmoid colon. Sigmoid colon mucosa appeared normal.  The scope was then brought back into the rectum. Rectal mucosa appeared  Normal.  Retroflexion was done. Internal hemorrhoids were seen. The scope was then withdrawn outside the patient. Plan: 1. Follow up on results of pathology  2-follow-up in the office in 1 to 2 weeks.     Electronically signed by Jennifer Granados MD on 12/31/2020 at 9:56 AM

## 2021-01-04 LAB — SURGICAL PATHOLOGY REPORT: NORMAL

## 2021-01-16 ENCOUNTER — HOSPITAL ENCOUNTER (OUTPATIENT)
Dept: MAMMOGRAPHY | Age: 66
Discharge: HOME | End: 2021-01-18
Payer: COMMERCIAL

## 2021-01-16 DIAGNOSIS — Z12.31 ENCOUNTER FOR SCREENING MAMMOGRAM FOR BREAST CANCER: ICD-10-CM

## 2021-01-16 DIAGNOSIS — Z13.820 SCREENING FOR OSTEOPOROSIS: ICD-10-CM

## 2021-01-16 PROCEDURE — 77063 BREAST TOMOSYNTHESIS BI: CPT

## 2021-01-16 PROCEDURE — 77080 DXA BONE DENSITY AXIAL: CPT

## 2021-03-29 ENCOUNTER — IMMUNIZATION (OUTPATIENT)
Dept: PRIMARY CARE CLINIC | Age: 66
End: 2021-03-29
Payer: COMMERCIAL

## 2021-03-29 PROCEDURE — 91300 COVID-19, PFIZER VACCINE 30MCG/0.3ML DOSE: CPT | Performed by: INTERNAL MEDICINE

## 2021-03-29 PROCEDURE — 0001A COVID-19, PFIZER VACCINE 30MCG/0.3ML DOSE: CPT | Performed by: INTERNAL MEDICINE

## 2021-04-19 ENCOUNTER — IMMUNIZATION (OUTPATIENT)
Dept: PRIMARY CARE CLINIC | Age: 66
End: 2021-04-19
Payer: COMMERCIAL

## 2021-04-19 PROCEDURE — 91300 COVID-19, PFIZER VACCINE 30MCG/0.3ML DOSE: CPT | Performed by: INTERNAL MEDICINE

## 2021-04-19 PROCEDURE — 0002A COVID-19, PFIZER VACCINE 30MCG/0.3ML DOSE: CPT | Performed by: INTERNAL MEDICINE

## 2022-10-02 ENCOUNTER — APPOINTMENT (OUTPATIENT)
Dept: CT IMAGING | Age: 67
End: 2022-10-02
Payer: COMMERCIAL

## 2022-10-02 ENCOUNTER — HOSPITAL ENCOUNTER (EMERGENCY)
Age: 67
Discharge: ANOTHER HOSPITAL | End: 2022-10-02
Payer: COMMERCIAL

## 2022-10-02 ENCOUNTER — APPOINTMENT (OUTPATIENT)
Dept: CT IMAGING | Age: 67
DRG: 199 | End: 2022-10-02
Payer: COMMERCIAL

## 2022-10-02 ENCOUNTER — HOSPITAL ENCOUNTER (OUTPATIENT)
Age: 67
Discharge: HOME | End: 2022-10-02
Payer: COMMERCIAL

## 2022-10-02 ENCOUNTER — HOSPITAL ENCOUNTER (INPATIENT)
Age: 67
LOS: 2 days | Discharge: HOME | DRG: 199 | End: 2022-10-04
Attending: STUDENT IN AN ORGANIZED HEALTH CARE EDUCATION/TRAINING PROGRAM | Admitting: INTERNAL MEDICINE
Payer: COMMERCIAL

## 2022-10-02 ENCOUNTER — APPOINTMENT (OUTPATIENT)
Dept: MRI IMAGING | Age: 67
DRG: 199 | End: 2022-10-02
Payer: COMMERCIAL

## 2022-10-02 DIAGNOSIS — G45.9 TIA (TRANSIENT ISCHEMIC ATTACK): Primary | ICD-10-CM

## 2022-10-02 PROBLEM — I16.0 HYPERTENSIVE URGENCY: Status: ACTIVE | Noted: 2022-10-02

## 2022-10-02 PROBLEM — I16.1 HYPERTENSIVE EMERGENCY: Status: ACTIVE | Noted: 2022-10-02

## 2022-10-02 PROBLEM — Z86.73 HISTORY OF CVA (CEREBROVASCULAR ACCIDENT): Status: ACTIVE | Noted: 2022-10-02

## 2022-10-02 LAB
ABSOLUTE EOS #: 0.2 K/UL (ref 0–0.4)
ABSOLUTE LYMPH #: 2.5 K/UL (ref 1–4.8)
ABSOLUTE MONO #: 0.6 K/UL (ref 0.1–1.3)
ANION GAP SERPL CALCULATED.3IONS-SCNC: 12 MMOL/L (ref 9–17)
BASOPHILS # BLD: 1 % (ref 0–2)
BASOPHILS ABSOLUTE: 0 K/UL (ref 0–0.2)
BUN BLDV-MCNC: 16 MG/DL (ref 8–23)
CALCIUM SERPL-MCNC: 9.6 MG/DL (ref 8.6–10.4)
CHLORIDE BLD-SCNC: 103 MMOL/L (ref 98–107)
CO2: 23 MMOL/L (ref 20–31)
CREAT SERPL-MCNC: 0.77 MG/DL (ref 0.5–0.9)
EOSINOPHILS RELATIVE PERCENT: 3 % (ref 0–4)
GFR AFRICAN AMERICAN: >60 ML/MIN
GFR NON-AFRICAN AMERICAN: >60 ML/MIN
GFR NON-AFRICAN AMERICAN: >60 ML/MIN
GFR SERPL CREATININE-BSD FRML MDRD: >60 ML/MIN
GFR SERPL CREATININE-BSD FRML MDRD: ABNORMAL ML/MIN/{1.73_M2}
GFR SERPL CREATININE-BSD FRML MDRD: NORMAL ML/MIN/{1.73_M2}
GLUCOSE BLD-MCNC: 129 MG/DL (ref 65–105)
GLUCOSE BLD-MCNC: 132 MG/DL (ref 70–99)
HCT VFR BLD CALC: 45.4 % (ref 36–46)
HEMOGLOBIN: 15.5 G/DL (ref 12–16)
INR BLD: 0.9
LYMPHOCYTES # BLD: 37 % (ref 24–44)
MCH RBC QN AUTO: 29.6 PG (ref 26–34)
MCHC RBC AUTO-ENTMCNC: 34.1 G/DL (ref 31–37)
MCV RBC AUTO: 86.8 FL (ref 80–100)
MONOCYTES # BLD: 9 % (ref 1–7)
MYOGLOBIN: 24 NG/ML (ref 25–58)
PARTIAL THROMBOPLASTIN TIME: 26.4 SEC (ref 24–36)
PDW BLD-RTO: 14.3 % (ref 11.5–14.9)
PLATELET # BLD: 337 K/UL (ref 150–450)
PMV BLD AUTO: 7.9 FL (ref 6–12)
POC CREATININE: 0.59 MG/DL (ref 0.51–1.19)
POTASSIUM SERPL-SCNC: 4.4 MMOL/L (ref 3.7–5.3)
PROTHROMBIN TIME: 12.2 SEC (ref 11.8–14.6)
RBC # BLD: 5.23 M/UL (ref 4–5.2)
SEG NEUTROPHILS: 50 % (ref 36–66)
SEGMENTED NEUTROPHILS ABSOLUTE COUNT: 3.4 K/UL (ref 1.3–9.1)
SODIUM BLD-SCNC: 138 MMOL/L (ref 135–144)
TOTAL CK: 81 U/L (ref 26–192)
TROPONIN, HIGH SENSITIVITY: 6 NG/L (ref 0–14)
WBC # BLD: 6.7 K/UL (ref 3.5–11)

## 2022-10-02 PROCEDURE — 82947 ASSAY GLUCOSE BLOOD QUANT: CPT

## 2022-10-02 PROCEDURE — 84484 ASSAY OF TROPONIN QUANT: CPT

## 2022-10-02 PROCEDURE — 6370000000 HC RX 637 (ALT 250 FOR IP): Performed by: STUDENT IN AN ORGANIZED HEALTH CARE EDUCATION/TRAINING PROGRAM

## 2022-10-02 PROCEDURE — 82553 CREATINE MB FRACTION: CPT

## 2022-10-02 PROCEDURE — G0378 HOSPITAL OBSERVATION PER HR: HCPCS

## 2022-10-02 PROCEDURE — 80048 BASIC METABOLIC PNL TOTAL CA: CPT

## 2022-10-02 PROCEDURE — A0427 ALS1-EMERGENCY: HCPCS

## 2022-10-02 PROCEDURE — 85025 COMPLETE CBC W/AUTO DIFF WBC: CPT

## 2022-10-02 PROCEDURE — 70450 CT HEAD/BRAIN W/O DYE: CPT

## 2022-10-02 PROCEDURE — 2580000003 HC RX 258: Performed by: STUDENT IN AN ORGANIZED HEALTH CARE EDUCATION/TRAINING PROGRAM

## 2022-10-02 PROCEDURE — 6360000002 HC RX W HCPCS: Performed by: INTERNAL MEDICINE

## 2022-10-02 PROCEDURE — A0425 GROUND MILEAGE: HCPCS

## 2022-10-02 PROCEDURE — 36415 COLL VENOUS BLD VENIPUNCTURE: CPT

## 2022-10-02 PROCEDURE — 82550 ASSAY OF CK (CPK): CPT

## 2022-10-02 PROCEDURE — 96360 HYDRATION IV INFUSION INIT: CPT

## 2022-10-02 PROCEDURE — 85730 THROMBOPLASTIN TIME PARTIAL: CPT

## 2022-10-02 PROCEDURE — 93005 ELECTROCARDIOGRAM TRACING: CPT | Performed by: STUDENT IN AN ORGANIZED HEALTH CARE EDUCATION/TRAINING PROGRAM

## 2022-10-02 PROCEDURE — 99223 1ST HOSP IP/OBS HIGH 75: CPT | Performed by: PSYCHIATRY & NEUROLOGY

## 2022-10-02 PROCEDURE — 92610 EVALUATE SWALLOWING FUNCTION: CPT

## 2022-10-02 PROCEDURE — 99223 1ST HOSP IP/OBS HIGH 75: CPT | Performed by: INTERNAL MEDICINE

## 2022-10-02 PROCEDURE — 2060000000 HC ICU INTERMEDIATE R&B

## 2022-10-02 PROCEDURE — 99222 1ST HOSP IP/OBS MODERATE 55: CPT | Performed by: PSYCHIATRY & NEUROLOGY

## 2022-10-02 PROCEDURE — 82565 ASSAY OF CREATININE: CPT

## 2022-10-02 PROCEDURE — 6360000004 HC RX CONTRAST MEDICATION: Performed by: STUDENT IN AN ORGANIZED HEALTH CARE EDUCATION/TRAINING PROGRAM

## 2022-10-02 PROCEDURE — 70551 MRI BRAIN STEM W/O DYE: CPT

## 2022-10-02 PROCEDURE — 70496 CT ANGIOGRAPHY HEAD: CPT

## 2022-10-02 PROCEDURE — 96372 THER/PROPH/DIAG INJ SC/IM: CPT

## 2022-10-02 PROCEDURE — 2580000003 HC RX 258: Performed by: INTERNAL MEDICINE

## 2022-10-02 PROCEDURE — 83874 ASSAY OF MYOGLOBIN: CPT

## 2022-10-02 PROCEDURE — 85610 PROTHROMBIN TIME: CPT

## 2022-10-02 PROCEDURE — 99285 EMERGENCY DEPT VISIT HI MDM: CPT

## 2022-10-02 RX ORDER — SODIUM CHLORIDE 0.9 % (FLUSH) 0.9 %
5-40 SYRINGE (ML) INJECTION PRN
Status: DISCONTINUED | OUTPATIENT
Start: 2022-10-02 | End: 2022-10-04 | Stop reason: HOSPADM

## 2022-10-02 RX ORDER — ENOXAPARIN SODIUM 100 MG/ML
40 INJECTION SUBCUTANEOUS DAILY
Status: DISCONTINUED | OUTPATIENT
Start: 2022-10-02 | End: 2022-10-04 | Stop reason: HOSPADM

## 2022-10-02 RX ORDER — SODIUM CHLORIDE 0.9 % (FLUSH) 0.9 %
5-40 SYRINGE (ML) INJECTION EVERY 12 HOURS SCHEDULED
Status: DISCONTINUED | OUTPATIENT
Start: 2022-10-02 | End: 2022-10-04 | Stop reason: HOSPADM

## 2022-10-02 RX ORDER — ASPIRIN 81 MG/1
81 TABLET ORAL DAILY
Status: DISCONTINUED | OUTPATIENT
Start: 2022-10-03 | End: 2022-10-04 | Stop reason: HOSPADM

## 2022-10-02 RX ORDER — GABAPENTIN 100 MG/1
100 CAPSULE ORAL NIGHTLY
Status: DISCONTINUED | OUTPATIENT
Start: 2022-10-02 | End: 2022-10-04 | Stop reason: HOSPADM

## 2022-10-02 RX ORDER — 0.9 % SODIUM CHLORIDE 0.9 %
100 INTRAVENOUS SOLUTION INTRAVENOUS ONCE
Status: COMPLETED | OUTPATIENT
Start: 2022-10-02 | End: 2022-10-02

## 2022-10-02 RX ORDER — ONDANSETRON 2 MG/ML
4 INJECTION INTRAMUSCULAR; INTRAVENOUS EVERY 6 HOURS PRN
Status: DISCONTINUED | OUTPATIENT
Start: 2022-10-02 | End: 2022-10-04 | Stop reason: HOSPADM

## 2022-10-02 RX ORDER — CLOPIDOGREL BISULFATE 75 MG/1
300 TABLET ORAL ONCE
Status: COMPLETED | OUTPATIENT
Start: 2022-10-02 | End: 2022-10-02

## 2022-10-02 RX ORDER — SODIUM CHLORIDE 9 MG/ML
25 INJECTION, SOLUTION INTRAVENOUS PRN
Status: DISCONTINUED | OUTPATIENT
Start: 2022-10-02 | End: 2022-10-04 | Stop reason: HOSPADM

## 2022-10-02 RX ORDER — PANTOPRAZOLE SODIUM 40 MG/1
40 TABLET, DELAYED RELEASE ORAL NIGHTLY
Status: DISCONTINUED | OUTPATIENT
Start: 2022-10-02 | End: 2022-10-04 | Stop reason: HOSPADM

## 2022-10-02 RX ORDER — POLYETHYLENE GLYCOL 3350 17 G/17G
17 POWDER, FOR SOLUTION ORAL DAILY PRN
Status: DISCONTINUED | OUTPATIENT
Start: 2022-10-02 | End: 2022-10-04 | Stop reason: HOSPADM

## 2022-10-02 RX ORDER — ASPIRIN 81 MG/1
324 TABLET, CHEWABLE ORAL ONCE
Status: COMPLETED | OUTPATIENT
Start: 2022-10-02 | End: 2022-10-02

## 2022-10-02 RX ORDER — ONDANSETRON 4 MG/1
4 TABLET, ORALLY DISINTEGRATING ORAL EVERY 8 HOURS PRN
Status: DISCONTINUED | OUTPATIENT
Start: 2022-10-02 | End: 2022-10-04 | Stop reason: HOSPADM

## 2022-10-02 RX ORDER — ATORVASTATIN CALCIUM 80 MG/1
80 TABLET, FILM COATED ORAL NIGHTLY
Status: DISCONTINUED | OUTPATIENT
Start: 2022-10-02 | End: 2022-10-04 | Stop reason: HOSPADM

## 2022-10-02 RX ORDER — SODIUM CHLORIDE 0.9 % (FLUSH) 0.9 %
10 SYRINGE (ML) INJECTION PRN
Status: DISCONTINUED | OUTPATIENT
Start: 2022-10-02 | End: 2022-10-02

## 2022-10-02 RX ORDER — CLOPIDOGREL BISULFATE 75 MG/1
75 TABLET ORAL DAILY
Status: DISCONTINUED | OUTPATIENT
Start: 2022-10-03 | End: 2022-10-04 | Stop reason: HOSPADM

## 2022-10-02 RX ORDER — DULOXETIN HYDROCHLORIDE 30 MG/1
30 CAPSULE, DELAYED RELEASE ORAL NIGHTLY
Status: DISCONTINUED | OUTPATIENT
Start: 2022-10-02 | End: 2022-10-04 | Stop reason: HOSPADM

## 2022-10-02 RX ADMIN — ENOXAPARIN SODIUM 40 MG: 100 INJECTION SUBCUTANEOUS at 16:31

## 2022-10-02 RX ADMIN — PANTOPRAZOLE SODIUM 40 MG: 40 TABLET, DELAYED RELEASE ORAL at 20:15

## 2022-10-02 RX ADMIN — GABAPENTIN 100 MG: 100 CAPSULE ORAL at 20:15

## 2022-10-02 RX ADMIN — SODIUM CHLORIDE 100 ML: 9 INJECTION, SOLUTION INTRAVENOUS at 12:03

## 2022-10-02 RX ADMIN — SODIUM CHLORIDE, PRESERVATIVE FREE 10 ML: 5 INJECTION INTRAVENOUS at 12:03

## 2022-10-02 RX ADMIN — ASPIRIN 324 MG: 81 TABLET, CHEWABLE ORAL at 12:07

## 2022-10-02 RX ADMIN — CLOPIDOGREL BISULFATE 300 MG: 75 TABLET ORAL at 12:09

## 2022-10-02 RX ADMIN — IOPAMIDOL 75 ML: 755 INJECTION, SOLUTION INTRAVENOUS at 12:03

## 2022-10-02 RX ADMIN — SODIUM CHLORIDE, PRESERVATIVE FREE 10 ML: 5 INJECTION INTRAVENOUS at 20:15

## 2022-10-02 RX ADMIN — DULOXETINE HYDROCHLORIDE 30 MG: 30 CAPSULE, DELAYED RELEASE ORAL at 20:15

## 2022-10-02 RX ADMIN — ATORVASTATIN CALCIUM 80 MG: 80 TABLET, FILM COATED ORAL at 20:15

## 2022-10-02 ASSESSMENT — ENCOUNTER SYMPTOMS
EYE DISCHARGE: 0
EYE ITCHING: 0
SHORTNESS OF BREATH: 0
NAUSEA: 0
ABDOMINAL PAIN: 0
SORE THROAT: 0
COUGH: 0
VOMITING: 0
BACK PAIN: 0
COLOR CHANGE: 0
RHINORRHEA: 0

## 2022-10-02 ASSESSMENT — LIFESTYLE VARIABLES
HOW OFTEN DO YOU HAVE A DRINK CONTAINING ALCOHOL: NEVER
HOW MANY STANDARD DRINKS CONTAINING ALCOHOL DO YOU HAVE ON A TYPICAL DAY: PATIENT DOES NOT DRINK

## 2022-10-02 ASSESSMENT — PAIN DESCRIPTION - FREQUENCY
FREQUENCY: INTERMITTENT
FREQUENCY: INTERMITTENT

## 2022-10-02 ASSESSMENT — PAIN SCALES - GENERAL
PAINLEVEL_OUTOF10: 3

## 2022-10-02 ASSESSMENT — PAIN DESCRIPTION - ORIENTATION
ORIENTATION: ANTERIOR

## 2022-10-02 ASSESSMENT — PAIN - FUNCTIONAL ASSESSMENT
PAIN_FUNCTIONAL_ASSESSMENT: NONE - DENIES PAIN
PAIN_FUNCTIONAL_ASSESSMENT: 0-10
PAIN_FUNCTIONAL_ASSESSMENT: NONE - DENIES PAIN
PAIN_FUNCTIONAL_ASSESSMENT: 0-10
PAIN_FUNCTIONAL_ASSESSMENT: 0-10

## 2022-10-02 ASSESSMENT — PAIN DESCRIPTION - DESCRIPTORS
DESCRIPTORS: ACHING

## 2022-10-02 ASSESSMENT — PAIN DESCRIPTION - PAIN TYPE: TYPE: ACUTE PAIN

## 2022-10-02 ASSESSMENT — PAIN DESCRIPTION - LOCATION
LOCATION: HEAD

## 2022-10-02 NOTE — VIRTUAL HEALTH
[address] [facility] Stroke and Vascular Neurology Consult for  [facility] Stroke Unit and [place] [facility] Stroke Alert @ 11:06  10/2/2022 12:04 PM  Pt Name: Valeriano Duggan  MRN: [id number]  YOB: 1955  Date of evaluation: 10/2/2022  Primary Care Physician: Aaliyah Hudson MD  Reason for Evaluation: Stroke Evaluation with Discussion with Ed or primary team with Telemedicine and stroke evaluation with Review of imaging and labs    Valeriano Duggan is a 61 y.o. female who presents with history of htn, with headaches since this past Friday. This am around 7 am she was noted to have dysarthria/aphasia. No new motor deficits. Noted epistaxis on mobile stroke unit. Ct head no hemorrhage. No iv tpa due to low nih, and suspected hypertensive urgency    LKW: 7:00  NIH:  0    Allergies  is allergic to latex, fluoxetine, oxycodone-acetaminophen, oxycodone-acetaminophen, and tape [adhesive tape]. Medications  Prior to Admission medications    Medication Sig Start Date End Date Taking? Authorizing Provider   aspirin 325 MG EC tablet Take 1 tablet by mouth 2 times daily  Patient not taking: No sig reported 6/12/18   Christina Catherine MD   vitamin C (ASCORBIC ACID) 500 MG tablet Take 500 mg by mouth daily    Historical Provider, MD   buprenorphine (BUPRENEX) 15 MCG/HR PTWK Place 1 patch onto the skin once a week. Bridget Blanchard Historical Provider, MD   mirabegron (MYRBETRIQ) 50 MG TB24 Take 1 tablet by mouth nightly   Patient not taking: Reported on 10/2/2022    Historical Provider, MD   metoprolol succinate (TOPROL XL) 25 MG extended release tablet Take 25 mg by mouth nightly     Historical Provider, MD   pantoprazole (PROTONIX) 40 MG tablet Take 40 mg by mouth nightly     Historical Provider, MD   DULoxetine (CYMBALTA) 30 MG extended release capsule Take 30 mg by mouth nightly    Historical Provider, MD   gabapentin (NEURONTIN) 100 MG capsule Take 100 mg by mouth nightly. Bridget Blanchard     Historical Provider, MD   tiZANidine (ZANAFLEX) 4 MG tablet Take 4 mg by mouth nightly    Historical Provider, MD   atorvastatin (LIPITOR) 10 MG tablet Take 10 mg by mouth  Patient not taking: Reported on 10/2/2022    Historical Provider, MD   RESTASIS 0.05 % ophthalmic emulsion INSTILL ONE DROP INTO BOTH EYES TWICE DAILY 9/9/16   Historical Provider, MD   ALPRAZolam Earlie [facility]) 0.5 MG tablet Take 0.5 mg by mouth nightly  Patient not taking: Reported on 10/2/2022    Historical Provider, MD   celecoxib (CELEBREX) 200 MG capsule Take 200 mg by mouth 2 times daily  8/12/15   Historical Provider, MD   letrozole [facility]) 2.5 MG tablet Take 2.5 mg by mouth nightly Pt not taking  Patient not taking: Reported on 10/2/2022 4/20/15   Historical Provider, MD BURKETT VITAMIN D-3 1000 UNITS TABS tablet   Take 1,000 Units by mouth 2 times daily  4/8/15   Historical Provider, MD    Scheduled Meds:   sodium chloride flush  5-40 mL IntraVENous 2 times per day    enoxaparin  40 mg SubCUTAneous Daily    [START ON 10/3/2022] aspirin  81 mg Oral Daily    [START ON 10/3/2022] clopidogrel  75 mg Oral Daily    atorvastatin  80 mg Oral Nightly    DULoxetine  30 mg Oral Nightly    gabapentin  100 mg Oral Nightly    pantoprazole  40 mg Oral Nightly     Continuous Infusions:   sodium chloride       PRN Meds:.sodium chloride flush, sodium chloride, ondansetron **OR** ondansetron, polyethylene glycol  Past Medical History   has a past medical history of Anxiety, Cancer ([address] Cerebral artery occlusion with cerebral infarction ([address] Depression, GERD (gastroesophageal reflux disease), Hyperlipidemia, Hypertension, Osteoarthritis, Peripheral vascular disease ([address] PONV (postoperative nausea and vomiting), Shortness of breath, Snores, and Urinary frequency.   Social History  Social History     Socioeconomic History    Marital status:      Spouse name: Not on file    Number of children: Not on file    Years of education: Not on file    Highest education level: Not on file   Occupational History    Not on file   Tobacco Use    Smoking status: Former     Types: Cigarettes     Quit date: 10/6/1986     Years since quittin.0    Smokeless tobacco: Never   Vaping Use    Vaping Use: Never used   Substance and Sexual Activity    Alcohol use: No    Drug use: No    Sexual activity: Not on file   Other Topics Concern    Not on file   Social History Narrative    Not on file     Social Determinants of Health     Financial Resource Strain: Not on file   Food Insecurity: Not on file   Transportation Needs: Not on file   Physical Activity: Not on file   Stress: Not on file   Social Connections: Not on file   Intimate Partner Violence: Not on file   Housing Stability: Not on file     Family History      Problem Relation Age of Onset    Cancer Father     Diabetes Brother        OBJECTIVE  BP (!) 154/94   Pulse 82   Temp 97.8 °F (36.6 °C) (Oral)   Resp 18   Ht 5' 1\" (1.549 m)   Wt 180 lb (81.6 kg)   LMP 10/06/2006 (LMP Unknown)   SpO2 94%   BMI 34.01 kg/m²     NIH Stroke Scale  Interval: Baseline  Level of Consciousness (1a): Alert  LOC Questions (1b): Answers both correctly  LOC Commands (1c): Performs both tasks correctly  Best Gaze (2): Normal  Visual (3): No visual loss  Facial Palsy (4): Normal symmetrical movement  Motor Arm, Left (5a): No drift  Motor Arm, Right (5b): No drift  Motor Leg, Left (6a): No drift  Motor Leg, Right (6b): No drift  Limb Ataxia (7): Absent  Sensory (8): Normal  Best Language (9): No aphasia  Dysarthria (10): Mild to moderate, slurs some words  Extinction and Inattention (11): No abnormality  Total: 1  Pre-Morbid mRS: 0    Imaging:  Images were personally reviewed with VIZ. AI and PACS used to review images including:  CT brain without contrast:  no hemorrhage  CTA imaging: no large vessel occlusion  Mri brain no acute stroke    Assessment    61 y.o. female who presents with history of htn, with headaches since this past Friday.   This am around 7 am she was noted to have dysarthria/aphasia  Differential DDx:  Samantha  vs hypertensive urgency      Recommendations:  NIH 1  Recommend Inpatient Neurology Consult for further assessment and evaluation   Aspirin  May gradually decrease sbp below 180  Further cardiac workup  Lipid panel, a1c        Discussed with ED Physician    At least 50 min of Telemedicine and time in conversation directly with ED staff and physician for the patient who is in imminent and life threatening deterioration without further treatment and evaluation. This Virtual Visit was conducted with patient's (and/or legal guardian's) consent, to provide telestroke consultation and necessary medical care. Time spent examining patient, reviewing the images personally, reviewing the chart, perform high complexity decision making and speaking with the nursing staff regarding recommendations      Katty Orellana MD, MD   Stroke, Neurocritical Care And/or [address] [place] [address] Stroke [address]  Electronically signed 10/2/2022 at 5:04 PM    Patient Location:  [address] [place] [address]    Provider Location ([facility]/State): [place], [place]    This virtual visit was conducted via interactive/real-time audio/video.

## 2022-10-02 NOTE — CONSULTS
NEUROLOGY INPATIENT CONSULT NOTE    10/2/2022         Rowan Jefferson is a  61 y.o. female admitted on 10/2/2022 with  TIA (transient ischemic attack) [G45.9]      History is obtained mostly from the patient and the medical record and from the caregivers. Chart is reviewed and patient is examined. Briefly, this is a  61 y.o. female with hx of HTN, HLD, PVD and hx of Rt breast ca s/p xrt and chemotherapyadmitted on 10/2/2022 with c/o headaches and episode of aphasia this morning ~ 7- 8 am.  Mobile stroke was called out. Patient was hypertensive on admit to mobile stroke unit with systolics > 858.,  \"576/638\" as per patient's son. He has witnessed her having inability to speak and with right facial droop lasting for upto an hour or so. CT head on admit the mobile stroke unit was negative for bleed. CTA head/neck unremarkable. Stroke team felt not a candidate for tPA. Patient stated that she has received meds and headaches are significantly improved with blood pressure control. Her prior headache was described as pressure-like moderately severe headache with associated photophobia and nausea. Presently she denies headache, dizziness and vision changes. Denies a speech and swallowing difficulties. Patient had a history of TIAs and prior strokes. Patient was not on any blood thinners or lipid-lowering agents. On admit to ed; she passed bedside swallow and she was loaded with aspirin and Plavix 300 mg daily. No current facility-administered medications on file prior to encounter. Current Outpatient Medications on File Prior to Encounter   Medication Sig Dispense Refill    aspirin 325 MG EC tablet Take 1 tablet by mouth 2 times daily (Patient not taking: Reported on 12/31/2020) 30 tablet 3    vitamin C (ASCORBIC ACID) 500 MG tablet Take 500 mg by mouth daily      buprenorphine (BUPRENEX) 15 MCG/HR PTWK Place 1 patch onto the skin once a week. .      Mirabegron ER (MYRBETRIQ) 50 MG TB24 Take 1 tablet by mouth nightly       metoprolol succinate (TOPROL XL) 25 MG extended release tablet Take 25 mg by mouth nightly       pantoprazole (PROTONIX) 40 MG tablet Take 40 mg by mouth nightly       DULoxetine (CYMBALTA) 30 MG extended release capsule Take 30 mg by mouth nightly      gabapentin (NEURONTIN) 100 MG capsule Take 100 mg by mouth nightly. Deacon Amelie tiZANidine (ZANAFLEX) 4 MG tablet Take 4 mg by mouth nightly      atorvastatin (LIPITOR) 10 MG tablet Take 10 mg by mouth      RESTASIS 0.05 % ophthalmic emulsion INSTILL ONE DROP INTO BOTH EYES TWICE DAILY  4    ALPRAZolam (XANAX) 0.5 MG tablet Take 0.5 mg by mouth nightly      celecoxib (CELEBREX) 200 MG capsule Take 200 mg by mouth 2 times daily   0    letrozole (FEMARA) 2.5 MG tablet Take 2.5 mg by mouth nightly Pt not taking      RA VITAMIN D-3 1000 UNITS TABS tablet   Take 1,000 Units by mouth 2 times daily   0     Allergies: Jessie Corey is allergic to latex, fluoxetine, oxycodone-acetaminophen, oxycodone-acetaminophen, and tape [adhesive tape].     Past Medical History:   Diagnosis Date    Anxiety     Cancer ([address]     right breast x 2, salivary gland    Cerebral artery occlusion with cerebral infarction ([facility] [address]     Depression     GERD (gastroesophageal reflux disease)     Hyperlipidemia     Hypertension     \"sometimes have high bloodpressure\" no treatment    Osteoarthritis     Peripheral vascular disease (HCC)     PONV (postoperative nausea and vomiting)     Shortness of breath     cannot climb 1 flight of stairs without becoming SOB, can do her own housework but she needs to rest during    Snores     Urinary frequency        Past Surgical History:   Procedure Laterality Date    BREAST RECONSTRUCTION Right 2014    BREAST SURGERY Right     breast lumpectomy with chemo and radiation    BUNIONECTOMY Left 2015    BUNIONECTOMY Right 10/10/2016    right foot bunionectomy     SECTION      X 1    COLONOSCOPY      COLONOSCOPY N/A 2020 COLONOSCOPY POLYPECTOMY SNARE/COLD BIOPSY performed by Seema Marques MD at [address]      cataract extraction    FOOT SURGERY Bilateral     JOINT REPLACEMENT Left 06/11/2018    knee    LIPOMA RESECTION Right     benign mass removed behind right ear    MASTECTOMY Right 12/5/2014    skin sparing with sentinel node biopsy and reconstruction with tissue expander implant    NOSE SURGERY      [place] TOTAL KNEE ARTHROPLASTY Left 6/11/2018    ROBOTIC LEFT KNEE TOTAL ARTHROPLASTY performed by Aisha Higuera MD at [facility]. [address]      for cancer    UPPER GASTROINTESTINAL ENDOSCOPY N/A 12/7/2020    EGD BIOPSY performed by Seema Marques MD at [id number] [address] Bilateral     WRIST FRACTURE SURGERY Right 09/21/2016    [facility]     Social History: Amy Dye  reports that she quit smoking about 36 years ago. Her smoking use included cigarettes. She has never used smokeless tobacco. She reports that she does not drink alcohol and does not use drugs.     Family History   Problem Relation Age of Onset    Cancer Father     Diabetes Brother        Current Medications:     sodium chloride flush  5-40 mL IntraVENous 2 times per day    enoxaparin  40 mg SubCUTAneous Daily    [START ON 10/3/2022] aspirin  81 mg Oral Daily    [START ON 10/3/2022] clopidogrel  75 mg Oral Daily    atorvastatin  80 mg Oral Nightly    DULoxetine  30 mg Oral Nightly    gabapentin  100 mg Oral Nightly    pantoprazole  40 mg Oral Nightly     PRN Meds include: sodium chloride flush, sodium chloride flush, sodium chloride, ondansetron **OR** ondansetron, polyethylene glycol    ROS:   Constitutional Negative for fever and chills   HEENT Negative for ear discharge, ear pain, nosebleed   Eyes Negative for photophobia, pain and discharge   Respiratory Negative for hemoptysis and sputum   Cardiovascular Negative for orthopnea, claudication and PND   Gastrointestinal Negative for abdominal pain, diarrhea, blood in stool   Musculoskeletal Negative for joint pain, negative for myalgia   Skin Negative for rash or itching   Endo/heme/allergies Negative for polydipsia, environmental allergy   Psychiatric Negative for suicidal ideation. Patient is not anxious           Objective:   BP (!) 144/92   Pulse 81   Temp 98.2 °F (36.8 °C) (Oral)   Resp 19   Ht 5' 1\" (1.549 m)   Wt 180 lb (81.6 kg)   LMP 10/06/2006 (LMP Unknown)   SpO2 95%   BMI 34.01 kg/m²     Blood pressure range: Systolic (56ALB), JHO:827 , Min:130 , WIU:906   ; Diastolic (71VHZ), DGA:80, Min:78, Max:97      Continuous infusions:    sodium chloride         ON EXAMINATION:  GENERAL  Appears comfortable and in no distress   HEENT  NC/ AT   NECK  Supple and no bruits heard   Cardiovascular  S1, S2 heard; radial pulse intact   MENTAL STATUS:  Alert, oriented, intact memory, no confusion, normal speech, normal language, no hallucination or delusion   CRANIAL NERVES: II     -       Pupils reactive b/l., Fundus exam: intact venous pulsations;  Visual fields intact to confrontation  III,IV,VI -  EOMs full, no afferent defect, no                      ANANYA, no ptosis  V     -     Normal facial sensation  VII    -     Normal facial symmetry  VIII   -     Intact hearing  IX,X -     Symmetrical palate  XI    -     Symmetrical shoulder shrug  XII   -     Midline tongue, no atrophy    MOTOR FUNCTION:  Normal bulk, normal tone, normal power;  no involuntary movements, no tremor   SENSORY FUNCTION:  Normal touch, normal pin, normal vibration, normal proprioception   CEREBELLAR FUNCTION:  Intact fine motor control over upper limbs   REFLEX FUNCTION:  Symmetric, no perverted reflex, no Babinski sign   STATION and GAIT Normal station and no ataxia noted          Data:    Lab Results:     Lab Results   Component Value Date    ALT 20 05/24/2018    AST 17 05/24/2018    INR 0.9 10/02/2022     Hematology:  Recent Labs     10/02/22  1130   WBC 6.7   HGB 15.5   HCT 45.4      INR 0.9     Chemistry:  Recent Labs     10/02/22  1130 10/02/22  1147     --    K 4.4  --      --    CO2 23  --    GLUCOSE 132*  --    BUN 16  --    CREATININE 0.77 0.59   CALCIUM 9.6  --      Recent Labs     10/02/22  1130   CKTOTAL 81       No results found for: PHENYTOIN, PHENYTOIN, VALPROATE, CBMZ        Diagnostic data reviewed:  CT head 10/2/22: unremarkable. CTA head and neck 10/2/22: No significant stenosis or occlusion. Impression and Plan: Ms. Petrona Briscoe is a 61 y.o. female with   Episode of TIA with uncontrolled hypertension; was loaded with aspirin and Plavix and to continue aspirin 81, Plavix 75 mg daily from tomorrow onwards for 3 wk followed by longterm ASA therapy; Lipid panel pending; Echo in am.   Also acetaminophen followed by fioricet prn for intractable headache  headaches associated with hypertensive encephalopathy; improving with blood pressure control; target systolic 928. Blood pressure control as per primary team.   MRI brain did not reveal any evidence of cerebral edema or posterior reversible encephalopathy. No evidence of infarction. Hx of Rt hemispheric cva few yrs ago and not on any antiplatelets at home  Hx of right breast carcinoma s/p radiation therapy and chemotherapy  Other comorbid conditions include peripheral vascular disease  Care plan is discussed with patient and her son and other family members at bedside. Will follow with you. Thank you for consultation. This note was partially created using voice recognition software and is inherently subject to errors including those of syntax and \"sound alike\" substitutions which may escape proofreading. In such instances, original meaning may be extrapolated by contextual derivation.   Feliberto Elder MD 10/2/2022 3:57 PM

## 2022-10-02 NOTE — ED PROVIDER NOTES
[facility]  Emergency Department Encounter  Emergency Medicine Physician     Pt Name: Misha Bazan  MRN: [id number]  [place] 1955  Date of evaluation: 10/2/22  PCP:  Ngozi Grimes MD    CHIEF COMPLAINT       Chief Complaint   Patient presents with    Aphasia    Headache     7       HISTORY OF PRESENT ILLNESS  (Location/Symptom, Timing/Onset, Context/Setting, Quality, Duration, Modifying Factors, Severity.)    Misha Bazan is a 61 y.o. female who presents with multiple complaints. Original complaint was headache that started this past Friday. Patient believes it was after verbal altercation with her brother. States however that this morning she began having some aphasia between 7 and 8 AM.  Mobile stroke was called out. Brought in by mobile stroke. Patient was hypertensive for mobile stroke with systolic blood pressures near 200 mmHg or higher. Also had epistaxis while in route which had stopped upon arrival.  On arrival, patient conscious, alert, and orient x4. She is complaining that she does not \"feel quite right\". She states that she has had previous strokes in the past and previous TIAs. States that she is not on a blood thinner or antiplatelet medication. Right now she denies any headache. She denies any sensory deficits or motor deficits. PAST MEDICAL / SURGICAL / SOCIAL / FAMILY HISTORY    has a past medical history of Anxiety, Cancer ([address] Cerebral artery occlusion with cerebral infarction ([address] Depression, GERD (gastroesophageal reflux disease), Hyperlipidemia, Hypertension, Osteoarthritis, Peripheral vascular disease ([address] PONV (postoperative nausea and vomiting), Shortness of breath, Snores, and Urinary frequency. has a past surgical history that includes Nose surgery; Varicose vein surgery (Bilateral); Foot surgery (Bilateral);  section; Mastectomy (Right, 2014); Bunionectomy (Left, 2015); lipoma resection (Right);  Wrist fracture surgery (Right, 2016); Bunionectomy (Right, 10/10/2016); Salivary gland surgery; Breast surgery (Right); Breast reconstruction (Right, 2014); eye surgery; Colonoscopy; joint replacement (Left, 2018); pr total knee arthroplasty (Left, 2018); Upper gastrointestinal endoscopy (N/A, 2020); and Colonoscopy (N/A, 2020). Social History     Socioeconomic History    Marital status:      Spouse name: Not on file    Number of children: Not on file    Years of education: Not on file    Highest education level: Not on file   Occupational History    Not on file   Tobacco Use    Smoking status: Former     Types: Cigarettes     Quit date: 10/6/1986     Years since quittin.0    Smokeless tobacco: Never   Vaping Use    Vaping Use: Never used   Substance and Sexual Activity    Alcohol use: No    Drug use: No    Sexual activity: Not on file   Other Topics Concern    Not on file   Social History Narrative    Not on file     Social Determinants of Health     Financial Resource Strain: Not on file   Food Insecurity: Not on file   Transportation Needs: Not on file   Physical Activity: Not on file   Stress: Not on file   Social Connections: Not on file   Intimate Partner Violence: Not on file   Housing Stability: Not on file       Family History   Problem Relation Age of Onset    Cancer Father     Diabetes Brother        Allergies:    Latex, Fluoxetine, Oxycodone-acetaminophen, Oxycodone-acetaminophen, and Tape [adhesive tape]    Home Medications:  Prior to Admission medications    Medication Sig Start Date End Date Taking? Authorizing Provider   aspirin 325 MG EC tablet Take 1 tablet by mouth 2 times daily  Patient not taking: Reported on 2020   Danika Ramirez MD   vitamin C (ASCORBIC ACID) 500 MG tablet Take 500 mg by mouth daily    Historical Provider, MD   buprenorphine (BUPRENEX) 15 MCG/HR PTWK Place 1 patch onto the skin once a week. Augie Varner     Historical Provider, MD Mirabegron ER (MYRBETRIQ) 50 MG TB24 Take 1 tablet by mouth nightly     Historical Provider, MD   metoprolol succinate (TOPROL XL) 25 MG extended release tablet Take 25 mg by mouth nightly     Historical Provider, MD   pantoprazole (PROTONIX) 40 MG tablet Take 40 mg by mouth nightly     Historical Provider, MD   DULoxetine (CYMBALTA) 30 MG extended release capsule Take 30 mg by mouth nightly    Historical Provider, MD   gabapentin (NEURONTIN) 100 MG capsule Take 100 mg by mouth nightly. Napa Gabino Historical Provider, MD   tiZANidine (ZANAFLEX) 4 MG tablet Take 4 mg by mouth nightly    Historical Provider, MD   atorvastatin (LIPITOR) 10 MG tablet Take 10 mg by mouth    Historical Provider, MD   RESTASIS 0.05 % ophthalmic emulsion INSTILL ONE DROP INTO BOTH EYES TWICE DAILY 9/9/16   Historical Provider, MD   ALPRAZolam Harmeet Joy) 0.5 MG tablet Take 0.5 mg by mouth nightly    Historical Provider, MD   celecoxib (CELEBREX) 200 MG capsule Take 200 mg by mouth 2 times daily  8/12/15   Historical Provider, MD   letrozole [facility]) 2.5 MG tablet Take 2.5 mg by mouth nightly Pt not taking 4/20/15   Historical Provider, MD   RA VITAMIN D-3 1000 UNITS TABS tablet   Take 1,000 Units by mouth 2 times daily  4/8/15   Historical Provider, MD       REVIEW OF SYSTEMS    (2-9 systems for level 4, 10 or more for level 5)    Review of Systems   Constitutional:  Negative for chills, fatigue and fever. HENT:  Negative for congestion and rhinorrhea. Respiratory:  Negative for cough and shortness of breath. Cardiovascular:  Negative for chest pain. Gastrointestinal:  Negative for abdominal pain, nausea and vomiting. Musculoskeletal:  Negative for myalgias. Neurological:  Positive for facial asymmetry, speech difficulty and headaches. Negative for weakness and numbness. All other systems reviewed and are negative.     PHYSICAL EXAM   (up to 7 for level 4, 8 or more for level 5)    INITIAL VITALS:   ED Triage Vitals   BP Temp Temp Source Heart Rate Resp SpO2 Height Weight   10/02/22 1129 10/02/22 1129 10/02/22 1129 10/02/22 1129 10/02/22 1129 10/02/22 1129 10/02/22 1212 10/02/22 1212   (!) 130/96 97.6 °F (36.4 °C) Oral 92 17 95 % 5' 1\" (1.549 m) 180 lb (81.6 kg)       Physical Exam  Vitals and nursing note reviewed. Constitutional:       General: She is not in acute distress. Appearance: She is well-developed. Eyes:      General: No visual field deficit. Cardiovascular:      Rate and Rhythm: Normal rate and regular rhythm. Pulses: Normal pulses. Radial pulses are 2+ on the right side and 2+ on the left side. Pulmonary:      Effort: Pulmonary effort is normal. No respiratory distress. Breath sounds: Normal breath sounds. No decreased breath sounds. Abdominal:      General: There is no distension. Palpations: Abdomen is soft. Tenderness: There is no abdominal tenderness. Musculoskeletal:      Right lower leg: No edema. Left lower leg: No edema. Skin:     General: Skin is warm and dry. Capillary Refill: Capillary refill takes less than 2 seconds. Neurological:      Mental Status: She is alert and oriented to person, place, and time. GCS: GCS eye subscore is 4. GCS verbal subscore is 5. GCS motor subscore is 6. Cranial Nerves: Dysarthria and facial asymmetry present. Sensory: Sensation is intact. No sensory deficit. Motor: Motor function is intact. No weakness, tremor or pronator drift.       Coordination: Finger-Nose-Finger Test and Heel to [employer] normal.       DIFFERENTIAL  DIAGNOSIS   PLAN (LABS / IMAGING / EKG):  Orders Placed This Encounter   Procedures    CTA HEAD NECK W CONTRAST    MRI BRAIN WO CONTRAST    STROKE PANEL    Basic Metabolic Panel w/ Reflex to MG    CBC    Diet NPO    Vital signs per unit routine    Up with assistance    Intake and output    Nursing swallow assessment    Full Code    Inpatient consult to Internal Medicine    Inpatient consult to Neurology    Inpatient consult to Social Work    OT eval and treat    OT eval and treat    PT evaluation and treat    PT eval and treat    Initiate Oxygen Therapy Protocol    SLP eval and treat    POC Glucose Fingerstick    Creatinine W/GFR Point of Care    EKG 12 Lead    ECHO Complete 2D W Doppler W Color    ADMIT TO INPATIENT       MEDICATIONS ORDERED:  Orders Placed This Encounter   Medications    clopidogrel (PLAVIX) tablet 300 mg    aspirin chewable tablet 324 mg    iopamidol (ISOVUE-370) 76 % injection 75 mL    sodium chloride flush 0.9 % injection 10 mL    0.9 % sodium chloride bolus    sodium chloride flush 0.9 % injection 5-40 mL    sodium chloride flush 0.9 % injection 5-40 mL    0.9 % sodium chloride infusion    enoxaparin (LOVENOX) injection 40 mg     Order Specific Question:   Indication of Use     Answer:   Prophylaxis-DVT/PE    OR Linked Order Group     ondansetron (ZOFRAN-ODT) disintegrating tablet 4 mg     ondansetron (ZOFRAN) injection 4 mg    polyethylene glycol (GLYCOLAX) packet 17 g    aspirin EC tablet 81 mg    clopidogrel (PLAVIX) tablet 75 mg    atorvastatin (LIPITOR) tablet 80 mg    DULoxetine (CYMBALTA) extended release capsule 30 mg    gabapentin (NEURONTIN) capsule 100 mg    pantoprazole (PROTONIX) tablet 40 mg         DIAGNOSTIC RESULTS / EMERGENCYDEPARTMENT COURSE / MDM   LABS:  Labs Reviewed   STROKE PANEL - Abnormal; Notable for the following components:       Result Value    Glucose 132 (*)     RBC 5.23 (*)     Monocytes 9 (*)     Myoglobin 24 (*)     All other components within normal limits   POC GLUCOSE FINGERSTICK - Abnormal; Notable for the following components:    POC Glucose 129 (*)     All other components within normal limits   CREATININE W/GFR POINT OF CARE       RADIOLOGY:  CT Head WO Contrast    Result Date: 10/2/2022  EXAMINATION: CT OF THE HEAD WITHOUT CONTRAST  10/2/2022 10:49 am TECHNIQUE: CT of the head was performed without the administration of intravenous contrast. Automated exposure control, iterative reconstruction, and/or weight based adjustment of the mA/kV was utilized to reduce the radiation dose to as low as reasonably achievable. COMPARISON: CT brain performed 02/28/2014. HISTORY: ORDERING SYSTEM PROVIDED HISTORY: Stroke TECHNOLOGIST PROVIDED HISTORY: Stroke FINDINGS: BRAIN/VENTRICLES: There is no acute intracranial hemorrhage, mass effect, or midline shift. There are remote right frontal lobe infarcts. Ventricles are symmetric and unremarkable. The infratentorial structures are unremarkable. ORBITS: The visualized portion of the orbits demonstrate no acute abnormality. SINUSES: The visualized paranasal sinuses and mastoid air cells demonstrate no acute abnormality. SOFT TISSUES/SKULL:  No acute abnormality of the visualized skull or soft tissues. No acute intracranial abnormality. Multiple remote right frontal lobe infarcts. Findings were discussed with IAM CROFT at 11:14 am on 10/2/2022. CTA HEAD NECK W CONTRAST    Result Date: 10/2/2022  EXAMINATION: CTA OF THE HEAD AND NECK WITH CONTRAST 10/2/2022 11:52 am: TECHNIQUE: CTA of the head and neck was performed with the administration of intravenous contrast. Multiplanar reformatted images are provided for review. MIP images are provided for review. Stenosis of the internal carotid arteries measured using NASCET criteria. Automated exposure control, iterative reconstruction, and/or weight based adjustment of the mA/kV was utilized to reduce the radiation dose to as low as reasonably achievable. This scan was analyzed using Viz. ai contact LVO. Identification of suspected findings is not for diagnostic use beyond notification. Viz LVO is limited to analysis of imaging data and should not be used in-lieu of full patient evaluation or relied upon to make or confirm diagnosis. COMPARISON: CT brain performed 10/02/2022.  HISTORY: ORDERING SYSTEM PROVIDED HISTORY: dysarthria and lower left facial droop TECHNOLOGIST PROVIDED HISTORY: dysarthria and lower left facial droop Decision Support Exception - unselect if not a suspected or confirmed emergency medical condition->Emergency Medical Condition (MA) Reason for Exam: dysarthria and lower left facial droop Additional signs and symptoms: hx of stroke FINDINGS: CTA NECK: AORTIC ARCH/ARCH VESSELS: No dissection or arterial injury. No significant stenosis of the brachiocephalic or subclavian arteries. CAROTID ARTERIES: No dissection, arterial injury, or hemodynamically significant stenosis by NASCET criteria. VERTEBRAL ARTERIES: No dissection, arterial injury, or significant stenosis. SOFT TISSUES: The lung apices are clear. No cervical or superior mediastinal lymphadenopathy. The larynx and pharynx are unremarkable. No acute abnormality of the salivary and thyroid glands. BONES: No acute osseous abnormality. CTA HEAD: ANTERIOR CIRCULATION: No significant stenosis of the intracranial internal carotid, anterior cerebral, or middle cerebral arteries. No aneurysm. POSTERIOR CIRCULATION: No significant stenosis of the vertebral, basilar, or posterior cerebral arteries. No aneurysm. OTHER: No dural venous sinus thrombosis on this non-dedicated study. BRAIN: No mass effect or midline shift. No extra-axial fluid collection. The gray-white differentiation is maintained. No significant stenosis or evidence for occlusion. EKG    EKG Interpretation    Interpreted by me    Rhythm: normal sinus   Rate: normal, 82  Axis: normal  Ectopy: none  Conduction: LVH  ST Segments: no acute change  T Waves: no acute change  Q Waves: none    Clinical Impression: Sinus rhythm rate of 82. No ST segment changes, no arrhythmia, no ectopy. Leftward axis, good R wave progression. Evidence of LVH.         All EKG's are interpreted by the Emergency Department Physician whoeither signs or Co-signs this chart in the absence of a cardiologist.    Impression:  Patient presents multiple symptoms. Headache started possibly on Saturday. She believes that this morning between 730 and 8 she began having some difficulty speaking possibly had some left-sided weakness. Then she stated it might of been 10:00. Unknown exactly when symptoms started and last known well. Was evaluated by mobile stroke unit. Was hypertensive for mobile stroke unit and there was concern for hypertensive encephalopathy. Evaluated by Dr. Devonte aFrah of the mobile stroke team.  He states that CT head is negative. I did speak with him and he recommended aspirin and Plavix loading. NIH score as below is 2. Plan for CTA head and neck, admission. INITIAL NIH STROKE SCALE    Time Performed:  6266 AM    Administer stroke scale items in the order listed. Record performance in each category after each subscale exam. Do not go back and change scores. Follow directions provided for each exam technique. Scores should reflect what the patient does, not what the clinician thinks the patient can do. The clinician should record answers while administering the exam and work quickly. Except where indicated, the patient should not be coached (i.e., repeated requests to patient to make a special effort). 1a.  Level of consciousness:  0 - alert; keenly responsive  1b. Level of consciousness questions:  0 - answers both questions correctly  1c. Level of consciousness questions:  0 - performs both tasks correctly  2. Best Gaze:  0 - normal  3. Visual:  0 - no visual loss  4. Facial Palsy:  1 - minor paralysis (flattened nasolabial fold, asymmetric on smiling)  5a. Motor left arm:  0 - no drift, limb holds 90 (or 45) degrees for full 10 seconds  5b. Motor right arm:  0 - no drift, limb holds 90 (or 45) degrees for full 10 seconds  6a. Motor left le - no drift; leg holds 30 degree position for full 5 seconds  6b. Motor right le - no drift; leg holds 30 degree position for full 5 seconds  7. Limb Ataxia:  0 - absent  8. Sensory:  0 - normal; no sensory loss  9. Best Language:  0 - no aphasia, normal  10. Dysarthria:  1 - mild to moderate, patient slurs at least some words and at worst, can be understood with some difficulty  11. Extinction and Inattention:  0 - no abnormality    TOTAL:  2      EMERGENCY DEPARTMENT COURSE:  Spoke with neurology who states they will come on his consult. We did order the MRI of the brain without contrast routine. Did speak with internal medicine who accepted patient to their service. Patient has been loaded with aspirin and Plavix. Headache has not recurred. Blood pressure still elevated somewhat but  wanted patient to have blood pressure less than 200 so this is acceptable. Patient otherwise remaining hemodynamically stable. Patient admitted to internal medicine      CONSULTS:  IP CONSULT TO INTERNAL MEDICINE  IP CONSULT TO NEUROLOGY  IP CONSULT TO SOCIAL WORK    CRITICAL CARE:  There was a high probability of clinically significant/life threatening deterioration in this patient's condition which required my urgent intervention. Total critical care time was 20 minutes. This excludes any time for separately reportable procedures. FINAL IMPRESSION     1. TIA (transient ischemic attack)          DISPOSITION / PLAN   DISPOSITION Admitted 10/02/2022 01:36:39 PM      PATIENT REFERRED TO:  No follow-up provider specified.     DISCHARGE MEDICATIONS:  New Prescriptions    No medications on file       Roni Quintana DO  Emergency Medicine Attending    (Please note that portions of this note were completed with a voice recognition program.  Efforts were made to edit the dictations but occasionally words are mis-transcribed.)          Roni Quintana DO  10/02/22 4401

## 2022-10-02 NOTE — H&P
[address] [place] [address]     HISTORY AND PHYSICAL EXAMINATION            Date:   10/2/2022  Patient name:  Long Calderón  Date of admission:  10/2/2022 11:27 AM  MRN:   [id number]  Account:  [de-identified]  YOB: 1955  PCP:    Mary Jo Hutchinson MD  Room:   05/05  Code Status:    Full Code    Chief Complaint:     Chief Complaint   Patient presents with    Aphasia    Headache     7       History Obtained From:     patient, family member     History of Present Illness:   Patient came in from the mobile stroke unit after having an episode of slurring of speech and right facial droop with left tongue deviation which lasted for around 30 minutes. Patient had a systolic blood pressure of above 200 in the mobile stroke unit CT taken also indicated showed no bleeding or any acute infarcts. CTA of the head and neck was unremarkable. She had a headache with the episode as well but that improved with blood pressure control. Patient was noncompliant with her medication, although she claims she took her blood pressure medication yesterday. She is not taking any statins or blood thinners before the episode.       Past Medical History:     Past Medical History:   Diagnosis Date    Anxiety     Cancer ([facility] 2014    right breast x 2, salivary gland    Cerebral artery occlusion with cerebral infarction ([facility] [address]     Depression     GERD (gastroesophageal reflux disease)     Hyperlipidemia     Hypertension     \"sometimes have high bloodpressure\" no treatment    Osteoarthritis     Peripheral vascular disease (HCC)     PONV (postoperative nausea and vomiting)     Shortness of breath     cannot climb 1 flight of stairs without becoming SOB, can do her own housework but she needs to rest during    Snores     Urinary frequency         Past SurgicalHistory:     Past Surgical History:   Procedure Laterality Date    BREAST RECONSTRUCTION Right 2014    BREAST SURGERY Right     breast lumpectomy with chemo and radiation    BUNIONECTOMY Left 2015    BUNIONECTOMY Right 10/10/2016    right foot bunionectomy     SECTION      X 1    COLONOSCOPY      COLONOSCOPY N/A 2020    COLONOSCOPY POLYPECTOMY SNARE/COLD BIOPSY performed by Nam Hdez MD at [address] [facility]      cataract extraction    FOOT SURGERY Bilateral     JOINT REPLACEMENT Left 2018    knee    LIPOMA RESECTION Right     benign mass removed behind right ear    MASTECTOMY Right 2014    skin sparing with sentinel node biopsy and reconstruction with tissue expander implant    NOSE SURGERY      CO TOTAL KNEE ARTHROPLASTY Left 2018    ROBOTIC LEFT KNEE TOTAL ARTHROPLASTY [facility]      [facility]    UPPER GASTROINTESTINAL ENDOSCOPY N/A 2020    EGD BIOPSY performed by Nam Hdez MD at [address] [facility] Bilateral     WRIST FRACTURE SURGERY Right 2016    [facility]        Medications Prior to Admission:        Prior to Admission medications    Medication Sig Start Date End Date Taking? Authorizing Provider   aspirin 325 MG EC tablet Take 1 tablet by mouth 2 times daily  Patient not taking: Reported on 2020   Lakisha Gan MD   vitamin C (ASCORBIC ACID) 500 MG tablet Take 500 mg by mouth daily    Historical Provider, MD   buprenorphine (BUPRENEX) 15 MCG/HR PTWK Place 1 patch onto the skin once a week. Carol Vance     Historical Provider, MD   Mirabegron ER (MYRBETRIQ) 50 MG TB24 Take 1 tablet by mouth nightly     Historical Provider, MD   metoprolol succinate (TOPROL XL) 25 MG extended release tablet Take 25 mg by mouth nightly     Historical Provider, MD   pantoprazole (PROTONIX) 40 MG tablet Take 40 mg by mouth nightly     Historical Provider, MD   DULoxetine (CYMBALTA) 30 MG extended release capsule Take 30 mg by mouth nightly    Historical Provider, MD   gabapentin (NEURONTIN) 100 MG capsule Take 100 mg by mouth nightly. Mu Patella Historical Provider, MD   tiZANidine (ZANAFLEX) 4 MG tablet Take 4 mg by mouth nightly    Historical Provider, MD   atorvastatin (LIPITOR) 10 MG tablet Take 10 mg by mouth    Historical Provider, MD   RESTASIS 0.05 % ophthalmic emulsion INSTILL ONE DROP INTO BOTH EYES TWICE DAILY 9/9/16   Historical Provider, MD   ALPRAZolam Earlie Sicard) 0.5 MG tablet Take 0.5 mg by mouth nightly    Historical Provider, MD   celecoxib (CELEBREX) 200 MG capsule Take 200 mg by mouth 2 times daily  8/12/15   Historical Provider, MD   letrozole [facility]) 2.5 MG tablet Take 2.5 mg by mouth nightly Pt not taking 4/20/15   Historical Provider, MD BURKETT VITAMIN D-3 1000 UNITS TABS tablet   Take 1,000 Units by mouth 2 times daily  4/8/15   Historical Provider, MD        Allergies:     Latex, Fluoxetine, Oxycodone-acetaminophen, Oxycodone-acetaminophen, and Tape [adhesive tape]    Social History:     Tobacco:    reports that she quit smoking about 36 years ago. Her smoking use included cigarettes. She has never used smokeless tobacco.  Alcohol:      reports no history of alcohol use. Drug Use:  reports no history of drug use. Family History:     Family History   Problem Relation Age of Onset    Cancer Father     Diabetes Brother        Review of Systems:     Positive and Negative as described in HPI. Review of Systems   Constitutional:  Negative for chills and fever. HENT:  Negative for rhinorrhea and sore throat. Eyes:  Negative for discharge and itching. Respiratory:  Negative for cough and shortness of breath. Cardiovascular:  Negative for chest pain. Gastrointestinal:  Negative for abdominal pain, nausea and vomiting. Endocrine: Negative for cold intolerance and heat intolerance. Genitourinary:  Negative for dysuria, frequency and urgency.    Musculoskeletal:  Negative for arthralgias and back pain. Skin:  Negative for color change. Neurological:  Positive for speech difficulty. Negative for dizziness and light-headedness. Psychiatric/Behavioral:  The patient is nervous/anxious. Physical Exam:   BP (!) 157/97   Pulse 82   Temp 98.2 °F (36.8 °C) (Oral)   Resp 20   Ht 5' 1\" (1.549 m)   Wt 180 lb (81.6 kg)   LMP 10/06/2006 (LMP Unknown)   SpO2 94%   BMI 34.01 kg/m²   Temp (24hrs), Av.1 °F (36.7 °C), Min:97.6 °F (36.4 °C), Max:98.3 °F (36.8 °C)    Recent Labs     10/02/22  1135   POCGLU 129*     No intake or output data in the 24 hours ending 10/02/22 1348    Physical Exam  Constitutional:       General: She is not in acute distress. Appearance: She is not ill-appearing. HENT:      Head: Atraumatic. Nose: No congestion. Mouth/Throat:      Mouth: Mucous membranes are moist.   Eyes:      General:         Left eye: No discharge. Pupils: Pupils are equal, round, and reactive to light. Cardiovascular:      Rate and Rhythm: Normal rate and regular rhythm. Heart sounds: No murmur heard. Pulmonary:      Effort: Pulmonary effort is normal. No respiratory distress. Breath sounds: Normal breath sounds. No wheezing. Chest:      Chest wall: No tenderness. Abdominal:      General: Abdomen is flat. Bowel sounds are normal. There is no distension. Palpations: Abdomen is soft. There is no mass. Tenderness: There is no abdominal tenderness. Hernia: No hernia is present. Musculoskeletal:         General: No swelling or tenderness. Cervical back: No rigidity or tenderness. Skin:     Coloration: Skin is not jaundiced or pale. Neurological:      General: No focal deficit present. Mental Status: She is alert and oriented to person, place, and time.    Psychiatric:         Mood and Affect: Mood normal.       Investigations:     Laboratory Testing:  Recent Results (from the past 24 hour(s))   STROKE PANEL    Collection Time: 10/02/22 11:30 AM   Result Value Ref Range    Glucose 132 (H) 70 - 99 mg/dL    BUN 16 8 - 23 mg/dL    Creatinine 0.77 0.50 - 0.90 mg/dL    Calcium 9.6 8.6 - 10.4 mg/dL    Sodium 138 135 - 144 mmol/L    Potassium 4.4 3.7 - 5.3 mmol/L    Chloride 103 98 - 107 mmol/L    CO2 23 20 - 31 mmol/L    Anion Gap 12 9 - 17 mmol/L    GFR Non-African American >60 >60 mL/min    GFR African American >60 >60 mL/min    GFR Comment          WBC 6.7 3.5 - 11.0 k/uL    RBC 5.23 (H) 4.0 - 5.2 m/uL    Hemoglobin 15.5 12.0 - 16.0 g/dL    Hematocrit 45.4 36 - 46 %    MCV 86.8 80 - 100 fL    MCH 29.6 26 - 34 pg    MCHC 34.1 31 - 37 g/dL    RDW 14.3 11.5 - 14.9 %    Platelets 225 041 - 712 k/uL    MPV 7.9 6.0 - 12.0 fL    Total CK 81 26 - 192 U/L    Seg Neutrophils 50 36 - 66 %    Lymphocytes 37 24 - 44 %    Monocytes 9 (H) 1 - 7 %    Eosinophils % 3 0 - 4 %    Basophils 1 0 - 2 %    Segs Absolute 3.40 1.3 - 9.1 k/uL    Absolute Lymph # 2.50 1.0 - 4.8 k/uL    Absolute Mono # 0.60 0.1 - 1.3 k/uL    Absolute Eos # 0.20 0.0 - 0.4 k/uL    Basophils Absolute 0.00 0.0 - 0.2 k/uL    Myoglobin 24 (L) 25 - 58 ng/mL    Protime 12.2 11.8 - 14.6 sec    INR 0.9     PTT 26.4 24.0 - 36.0 sec    Troponin, High Sensitivity 6 0 - 14 ng/L   POC Glucose Fingerstick    Collection Time: 10/02/22 11:35 AM   Result Value Ref Range    POC Glucose 129 (H) 65 - 105 mg/dL   Creatinine W/GFR Point of Care    Collection Time: 10/02/22 11:47 AM   Result Value Ref Range    POC Creatinine 0.59 0.51 - 1.19 mg/dL    GFR Comment >60 >60 mL/min    GFR Non-African American >60 >60 mL/min    GFR Comment         EKG 12 Lead    Collection Time: 10/02/22 12:52 PM   Result Value Ref Range    Ventricular Rate 82 BPM    Atrial Rate 82 BPM    P-R Interval 152 ms    QRS Duration 92 ms    Q-T Interval 414 ms    QTc Calculation (Bazett) 483 ms    P Axis 32 degrees    R Axis -44 degrees    T Axis 31 degrees       Imaging/Diagnostics:  CT Head WO Contrast    Result Date: 10/2/2022  EXAMINATION: CT OF THE HEAD WITHOUT CONTRAST  10/2/2022 10:49 am TECHNIQUE: CT of the head was performed without the administration of intravenous contrast. Automated exposure control, iterative reconstruction, and/or weight based adjustment of the mA/kV was utilized to reduce the radiation dose to as low as reasonably achievable. COMPARISON: CT brain performed 02/28/2014. HISTORY: ORDERING SYSTEM PROVIDED HISTORY: Stroke TECHNOLOGIST PROVIDED HISTORY: Stroke FINDINGS: BRAIN/VENTRICLES: There is no acute intracranial hemorrhage, mass effect, or midline shift. There are remote right frontal lobe infarcts. Ventricles are symmetric and unremarkable. The infratentorial structures are unremarkable. ORBITS: The visualized portion of the orbits demonstrate no acute abnormality. SINUSES: The visualized paranasal sinuses and mastoid air cells demonstrate no acute abnormality. SOFT TISSUES/SKULL:  No acute abnormality of the visualized skull or soft tissues. No acute intracranial abnormality. Multiple remote right frontal lobe infarcts. Findings were discussed with IAM CROFT at 11:14 am on 10/2/2022. CTA HEAD NECK W CONTRAST    Result Date: 10/2/2022  EXAMINATION: CTA OF THE HEAD AND NECK WITH CONTRAST 10/2/2022 11:52 am: TECHNIQUE: CTA of the head and neck was performed with the administration of intravenous contrast. Multiplanar reformatted images are provided for review. MIP images are provided for review. Stenosis of the internal carotid arteries measured using NASCET criteria. Automated exposure control, iterative reconstruction, and/or weight based adjustment of the mA/kV was utilized to reduce the radiation dose to as low as reasonably achievable. This scan was analyzed using Viz. ai contact LVO. Identification of suspected findings is not for diagnostic use beyond notification. Viz LVO is limited to analysis of imaging data and should not be used in-lieu of full patient evaluation or relied upon to make or confirm diagnosis. COMPARISON: CT brain performed 10/02/2022. HISTORY: ORDERING SYSTEM PROVIDED HISTORY: dysarthria and lower left facial droop TECHNOLOGIST PROVIDED HISTORY: dysarthria and lower left facial droop Decision Support Exception - unselect if not a suspected or confirmed emergency medical condition->Emergency Medical Condition (MA) Reason for Exam: dysarthria and lower left facial droop Additional signs and symptoms: hx of stroke FINDINGS: CTA NECK: AORTIC ARCH/ARCH VESSELS: No dissection or arterial injury. No significant stenosis of the brachiocephalic or subclavian arteries. CAROTID ARTERIES: No dissection, arterial injury, or hemodynamically significant stenosis by NASCET criteria. VERTEBRAL ARTERIES: No dissection, arterial injury, or significant stenosis. SOFT TISSUES: The lung apices are clear. No cervical or superior mediastinal lymphadenopathy. The larynx and pharynx are unremarkable. No acute abnormality of the salivary and thyroid glands. BONES: No acute osseous abnormality. CTA HEAD: ANTERIOR CIRCULATION: No significant stenosis of the intracranial internal carotid, anterior cerebral, or middle cerebral arteries. No aneurysm. POSTERIOR CIRCULATION: No significant stenosis of the vertebral, basilar, or posterior cerebral arteries. No aneurysm. OTHER: No dural venous sinus thrombosis on this non-dedicated study. BRAIN: No mass effect or midline shift. No extra-axial fluid collection. The gray-white differentiation is maintained. No significant stenosis or evidence for occlusion.        Assessment :      Primary Problem  TIA (transient ischemic attack)    Active Hospital Problems    Diagnosis Date Noted    TIA (transient ischemic attack) [G45.9] 10/02/2022     Priority: Medium       Plan:     Patient status Admit as inpatient in the  Progressive Unit/Step down    Hypertensive emergency with a possibility of stroke versus TIA   -CT head and neck without contrast showed no stenosis or occlusion   -CT head without contrast showed no acute abnormality, but multiple remote right frontal lobe infarcts   -MRI brain pending   -ASA, statin, Plavix started   -Echo ordered   -Start swallowing study passed, SLP evaluation, PT/OT ordered   -Lipid panel ordered  -Neurology on board      Depression and anxiety   -On home dose of duloxetine    DVT Prophylaxis-Lovenox 40 mg  Protonix 40 mg for stomach protection      Consultations:   IP CONSULT TO INTERNAL MEDICINE  IP CONSULT TO NEUROLOGY  IP CONSULT TO SOCIAL WORK    Patient is admitted as inpatient status because of co-morbiditieslisted above, severity of signs and symptoms as outlined, requirement for current medical therapies and most importantly because of direct risk to patient if care not provided in a hospital setting.     Sabrina Ya MD  10/2/2022  1:48 PM    Copy sent to Dr. Denisse Gabriel MD

## 2022-10-02 NOTE — PLAN OF CARE
Problem: Discharge Planning  Goal: Discharge to home or other facility with appropriate resources  10/2/2022 1817 by Andres Bear RN  Outcome: Progressing  10/2/2022 1817 by Andres Bear RN  Outcome: Progressing     Problem: Safety - Adult  Goal: Free from fall injury  Outcome: Progressing     Problem: Pain  Goal: Verbalizes/displays adequate comfort level or baseline comfort level  Outcome: Progressing

## 2022-10-02 NOTE — ED NOTES
..  10/2/2022 11:56 AM    Patient: John Erazo, 61 y.o., female Race:  Patient Address: [address] [place] [address] Ave  [address]  Incident Address:  [x] [address] [place] [address]. [address] [place] [address]    [x] [facility] [place]  [] [facility]  [] [facility] ORTHOPEDIC AND SPINE [facility] AT [place]   [] ANKUSH FERNANDEZ JR. [facility]  [] [facility]  Patient Phone #: [phone]   Insurance: Payor: [email or web address] [facility]  [address] /  /  /   [facility] Comp:  []  Yes   [x]  No  Emergency Contact: Extended Emergency Contact Information  Primary Emergency Contact: Jj Jin  Address: X  Home Phone: [phone]  Relation: Child  MRN: [id number]  [facility]# [id number]  YOB: 1955  Primary Care Physician: Cliff Carroll MD  Advance Directive: [x] Full Code   []DNR-CC   []DNR-CCA    MSU Crew: Romulo Hoskins - Paramedic, Jordan Vega - RN    Pt Transported To:  [] [place] [address]  [] [facility]  [x] [facility]  [] Radha Ny   [] [facility]  [] [facility]  [facility] [] [facility] [] [facility]    Was patient transported to closest facility? [x]Yes  []No (if No specify reason)   []   Patient/family request    []   Divert to specialist       Response Code   [] 2  [x] 3  []   Change  [] 2  [] 3   Transport Code   [x] 2  [] 3  []   Change  [] 2  [] 3     Mileage:  [address]   Total Miles 1.0     Call Received 10/2/2022 1031   Dispatched 10/2/2022 1032   Enroute 10/2/2022 1035   Arrived Scene 10/2/2022 1038   At Patient 10/2/2022 1040   Decision to Scan 10/2/2022 1042   Scan 10/2/2022 1054   Departed Scene 10/2/2022 [address] 10/2/2022 1116   Departed Hospital 10/2/2022 1147   In Service 10/2/2022 1147         Allergies  [] Updated/Reviewed by MSU  [x] Historical Data Only  [] Unavailable  is allergic to latex, fluoxetine, oxycodone-acetaminophen, oxycodone-acetaminophen, and tape [adhesive tape]. Medications  [] Updated/Reviewed by MSU  [x] Historical Data Only  [] Unavailable  Prior to Admission medications    Medication Sig Start Date End Date Taking?  Authorizing Provider aspirin 325 MG EC tablet Take 1 tablet by mouth 2 times daily  Patient not taking: Reported on 12/31/2020 6/12/18   Aysha Peña MD   vitamin C (ASCORBIC ACID) 500 MG tablet Take 500 mg by mouth daily    Historical Provider, MD   buprenorphine (BUPRENEX) 15 MCG/HR PTWK Place 1 patch onto the skin once a week. Darrell Payne Historical Provider, MD   Mirabegron ER (MYRBETRIQ) 50 MG TB24 Take 1 tablet by mouth nightly     Historical Provider, MD   metoprolol succinate (TOPROL XL) 25 MG extended release tablet Take 25 mg by mouth nightly     Historical Provider, MD   pantoprazole (PROTONIX) 40 MG tablet Take 40 mg by mouth nightly     Historical Provider, MD   DULoxetine (CYMBALTA) 30 MG extended release capsule Take 30 mg by mouth nightly    Historical Provider, MD   gabapentin (NEURONTIN) 100 MG capsule Take 100 mg by mouth nightly. Darrell Payne     Historical Provider, MD   tiZANidine (ZANAFLEX) 4 MG tablet Take 4 mg by mouth nightly    Historical Provider, MD   atorvastatin (LIPITOR) 10 MG tablet Take 10 mg by mouth    Historical Provider, MD   RESTASIS 0.05 % ophthalmic emulsion INSTILL ONE DROP INTO BOTH EYES TWICE DAILY 9/9/16   Historical Provider, MD   ALPRAZolam Stacey Cowden) 0.5 MG tablet Take 0.5 mg by mouth nightly    Historical Provider, MD   celecoxib (CELEBREX) 200 MG capsule Take 200 mg by mouth 2 times daily  8/12/15   Historical Provider, MD   letrozole [facility]) 2.5 MG tablet Take 2.5 mg by mouth nightly Pt not taking 4/20/15   Historical Provider, MD BURKETT VITAMIN D-3 1000 UNITS TABS tablet   Take 1,000 Units by mouth 2 times daily  4/8/15   Historical Provider, MD      Past Medical History  [] Updated/Reviewed by MSU  [x] Historical Data Only  [] Unavailable   has a past medical history of Anxiety, Cancer ([address] Cerebral artery occlusion with cerebral infarction ([address] Depression, GERD (gastroesophageal reflux disease), Hyperlipidemia, Hypertension, Osteoarthritis, Peripheral vascular disease ([address] PONV (postoperative nausea and vomiting), Shortness of breath, Snores, and Urinary frequency. Patient Weight: 180 lbs   []   Estimated   [x]   Stated          VITALS          Time BP Pulse   Resp  Rate N-normal  S-shallow  D-deep Monitor SpO2 O2    LPM BGL   Temp Pain   1050 186/74 101 14 N ST 96  NA 0   1058 176/115 111 13 N ST 95 RA      1110 175/94 101 20 N ST 96 RA      1112 167/98 96 18 N NSR 96 RA      1115 152/91 99 20 N NSR 96 RA         Pupils GCS   Time R L E  4 V  5 M  6 T  15   1040 3 3 4 5 6 15                                           NIH -   record on all transports and Q15 min after tPA administration    NIHSS       Time 1040 1050     1a. LOC - Arousal Status 0      1b. LOC - Questions 0      1c. LOC - Commands 0      2. Eye Movements (gaze) 0      3. Visual Fields 0      4. Facial Palsy 0      5a. Motor Left Arm 0      5b. Motor Right Arm 0      6a. Motor Left Leg 0      6b. Motor Right Leg 0      7. Limb Ataxia 0      8. Sensory 0      9. Language/Aphasia 0      10. Dysarthria 1 0     11. Neglect 0      TOTAL 1 0         Research:    RACE Score: 0 Time: 3626  StrokeVAN Score: neg Time:1040    Time Last Known Well: 10/02/2022 1030    Narrative:    Dispatched to possible CVA per [facility]. Arrived to find Jj Mathis, 61 y.o., female c/o slurred speech and deviated tongue. Report received from [facility] EMS at patients side.  at patient side via telemedicine unit. Son at pt side states he was visiting with her when her speech became slurred and her tongue deviated to the right. Pt has a HX of TIA's in the past. Pt nose was bleeding on arrival as well. Initial BP for EMS was 240/1110. Upon our arrival the patients symptoms had resolved but pt has c/o frontal headache and dizziness. Dr. Michael Samaniego ordered CT scan to be completed. CT scan reviewed and orders received to transport pt to closest facility. IV access initiated, labs drawn along with ISTAT CR. CR was 0.8.  Report called to [facility] ER along with ETA. Patient received the following medications prior to MSU arrival:NA  Patient has the following lines prior to MSU arrival:NA  Patient has the following airway placed prior to MSU arrival:NA    Patient was transferred to cot via 2 person assist and secured with straps x3. Zoll ECG, SpO2, and NIBP applied and monitored throughout encounter. HOB maintained at 30 degrees. Patient was transferred to ambulance, cot secured in scan position. Non contrast CT scan performed. After scan cot secured in transport position. IV thrombolytic (Alteplase or Tenecteplase) inclusion/exclusion criteria reviewed with patient and physician. Candidate for IV thrombolytic therapy? [] Yes   [x] No - due to the following exclusion criteria:    [] ICH   [] Taking anticoagulant -   [] Beyond last time known well window  [x] At or returned to baseline   [] Marked improvement of symptoms  [] No disabling symptoms  [] Other -     Patient is being transported to [address] [place] [address] . During transport patient rests comfortably. Cabin temp maintained at 70-72 °F throughout transport. Patient was transferred to bed ER # 5 via 4 person sheet lift. . Patient care handoff completed with  Kathy Cho RN at bedside. Imaging:  Images were obtained onboard the MSU including:  [x] CT brain without contrast - see results below:      CT Head WO Contrast    Result Date: 10/2/2022  EXAMINATION: CT OF THE HEAD WITHOUT CONTRAST  10/2/2022 10:49 am TECHNIQUE: CT of the head was performed without the administration of intravenous contrast. Automated exposure control, iterative reconstruction, and/or weight based adjustment of the mA/kV was utilized to reduce the radiation dose to as low as reasonably achievable. COMPARISON: CT brain performed 02/28/2014.  HISTORY: ORDERING SYSTEM PROVIDED HISTORY: Stroke TECHNOLOGIST PROVIDED HISTORY: Stroke FINDINGS: BRAIN/VENTRICLES: There is no acute intracranial hemorrhage, mass effect, or midline shift.  There are remote right frontal lobe infarcts. Ventricles are symmetric and unremarkable. The infratentorial structures are unremarkable. ORBITS: The visualized portion of the orbits demonstrate no acute abnormality. SINUSES: The visualized paranasal sinuses and mastoid air cells demonstrate no acute abnormality. SOFT TISSUES/SKULL:  No acute abnormality of the visualized skull or soft tissues. No acute intracranial abnormality. Multiple remote right frontal lobe infarcts. Findings were discussed with IAM CROFT at 11:14 am on 10/2/2022. Physical assessment performed:    Neuro: Pt is A/O x3 PERRLA @ 3mm. Gaze midline. No facial droop. No slurred speech. Moves all extremities x 4. Hand grasps equal.   Resp: lungs clear to auscultation bilaterally, normal effort  Cardiac: regular rate, no murmur, 12 lead ECG shows NSR to ST  Muscular/skeletal/peripheral vascular: no edema, no redness or tenderness in the calves, pulses present in 4 extremities  Bleeding from nose. Abd/GI/: abd distended soft, non tender, bowel sounds present x 4 quadrants   Skin: normal color, warm, dry      IV LINES   Time Size Site # Attempts Performed By   1110 20 gauge LAC 2 S [place]angel                     Total Amount of IV Fluids Infused: NA    MEDS / ELECTRICAL RX   Time Therapy Dosage Route Response Performed By   NA                                                    Thrombolytic Administration    Time Bolus Dose Infusion Dose Waste   NA                [] thrombolytic dosing double checked by      ACUTE STROKE DYSPHAGIA SCREEN              YES NO   1 GCS less than 13?         2 Facial Asymmetry or Weakness? 3 Tongue Asymmetry or Weakness? 4 Palatal Asymmetry or Weakness?        5 Signs of aspiration during 3oz water test?*                 If answers to questions 1-4 are NO proceed to 3oz water test               *Administer 3oz of water for sequential drinks, note any throat clearing, cough, or change in vocal quality immediately after and 1 minute following the swallow.                If answers to questions 1-5 are NO proceed with ordered PO meds       POC LABS      TIME 1110     Test (reference range)      FSBG ()      PT (11-13.5)      INR (0.8-1.1)      Na (138-146)      K (3.5-4.9)      Cl ()      iCa (1.2-1.32)      TCO2 (24-29)      Glu ()      BUN (8.0-26)      Crea (0.6-1.3) 0.8     Hct (38-51)      Hb (12.0-17)      AnGap 10.0-20)                Assistance:   [x]    Fire - [place]    []    Police    [x]    Other EMS (See Below)    LS# 2      Hospital Notification:    [address] -  [] [address]  [x] [facility]  [] [facility]  [] [facility]  [] [facility] [] [facility] [] [facility] [] [facility]  [] [employer]     [x]    [employer]:     []    Cell Phone     Med Control Orders Received:   [x] No  []  Yes:     Med Control Physician (if on line medical direction received)  -        Hospital Team Alert:   []    Trauma Alert    []    STEMI Alert         []    Stroke Alert    []    RACE Alert      Description Of Valuables: NA    Patient Valuables:   []    With Patient    [x]    Not Received    []    ER / Lab     Call Outcome:   [x]    Transport to Facility    []    Care Transferred to [facility]    []    Cancelled    []    Patient Refusal    []                           Mobile Stroke Unit    Electronically signed by Misael Loaiza RN on 10/2/22 at 11:56 AM EDT      Anand Shay RN  10/02/22 7544

## 2022-10-02 NOTE — ED TRIAGE NOTES
Mode of arrival (squad #, walk in, police, etc) : squad        Chief complaint(s): slurred speech, tongue deviation, headache        Arrival Note (brief scenario, treatment PTA, etc). : Squad reports patient had slurred speech and tongue deviation which started approximately 1030H. BP was SBP =200mmhg and went down to SBP =160. Squad also states patient had one episode of epistaxis during the transport. Patient reports having frontal headache for 2 days. Denies any numbness of extremities, loss of consciousness nor weakness. Squad established an IV line at left antecubital area gauge 20 and glucose of 126mg/dl. C= \"Have you ever felt that you should Cut down on your drinking? \"  No  A= \"Have people Annoyed you by criticizing your drinking? \"  No  G= \"Have you ever felt bad or Guilty about your drinking? \"  No  E= \"Have you ever had a drink as an Eye-opener first thing in the morning to steady your nerves or to help a hangover? \"  No      Deferred []      Reason for deferring: N/A    *If yes to two or more: probable alcohol abuse. *

## 2022-10-02 NOTE — PROGRESS NOTES
Went to admit patient and she got called down for MRI. She was put in a gown and taken to Radiology via wheelchair.

## 2022-10-02 NOTE — PROGRESS NOTES
Dr Aramis Carey notified new admission is to floor. Dr Daniel Tabares notified pt is on floor and of CT brain results:   Multiple remote right frontal lobe infarcts.     Continue to do NIHSS Q4H fir first 24 hrs, then q shift after per usual protocol

## 2022-10-02 NOTE — ED NOTES
TRANSFER - OUT REPORT:    Verbal report given to Oscar Vo on Master Baez  being transferred to [facility] for routine progression of patient care       Report consisted of patient's Situation, Background, Assessment and   Recommendations(SBAR). Information from the following report(s) ED SBAR was reviewed with the receiving nurse. Lines:   Peripheral IV 10/02/22 Left Antecubital (Active)        Opportunity for questions and clarification was provided.       Patient transported with:  Milton Corado RN  [id number]/63 [phone]

## 2022-10-02 NOTE — PROGRESS NOTES
Attending Physician Statement  I have discussed the care of Lavon Falcon with the resident team. I have examined the patient myself and taken ros and hpi , including pertinent history and exam findings,  with the resident. I have reviewed the key elements of all parts of the encounter with the resident. I agree with the assessment, plan and orders as documented by the resident. Principal Problem:    Hypertensive emergency  Active Problems:    TIA (transient ischemic attack)  Resolved Problems:    * No resolved hospital problems. *    Stroke like symp- slurring of speech, right facial droop  With hypertensive emergency  Ct/CTA neg  MRI brain pending  ASA, Statin, plavix, neuro consult  Echo ordered    Full note to follow.       Electronically signed by Carollynn Duane, MD

## 2022-10-02 NOTE — PROGRESS NOTES
Speech Language Pathology  Facility/Department: [address] [place] [address]   CLINICAL BEDSIDE SWALLOW EVALUATION    NAME: Beatrice Morrissey  : 1955  MRN: [id number]    ADMISSION DATE: 10/2/2022  ADMITTING DIAGNOSIS: has Fracture; Chronic pain; Depression; Hyperlipidemia; Breast cancer in female [facility]); Neoplasm of parotid gland; Osteopenia; Osteoporosis, post-menopausal; Primary cancer of parotid gland ([address] Primary osteoarthritis of left knee; TIA (transient ischemic attack); and Hypertensive emergency on their problem list.    Recent Chest Xray: n/a    Date of Eval: 10/2/2022  Evaluating Therapist: SALAZAR Cisneros    Current Diet level:  Current Diet : NPO (pending BSE)      Primary Complaint  Beatrice Morrissey is a 61 y.o. female who presents with multiple complaints. Original complaint was headache that started this past Friday. Patient believes it was after verbal altercation with her brother. States however that this morning she began having some aphasia between 7 and 8 AM.  Mobile stroke was called out. Brought in by mobile stroke. Patient was hypertensive for mobile stroke with systolic blood pressures near 200 mmHg or higher. Also had epistaxis while in route which had stopped upon arrival.  On arrival, patient conscious, alert, and orient x4. She is complaining that she does not \"feel quite right\". She states that she has had previous strokes in the past and previous TIAs. States that she is not on a blood thinner or antiplatelet medication. Right now she denies any headache. She denies any sensory deficits or motor deficits.     Pain:  Pain Assessment  Pain Assessment: None - Denies Pain  Pain Level: 3  Pain Location: Head  Pain Orientation: Anterior  Pain Descriptors: Aching  Pain Type: Acute pain  Pain Frequency: Intermittent    Reason for Referral  Beatrice Morrissey was referred for a bedside swallow evaluation to assess the efficiency of her swallow function, identify signs and symptoms of aspiration and make recommendations regarding safe dietary consistencies, effective compensatory strategies, and safe eating environment. Impression  Dysphagia Diagnosis: Swallow function appears WFL  Dysphagia Impression : Pt demo no overt s/s aspiration or dysphagia and has no complaints of difficulty chewing or swallowing. Recommend regular textures with thin liquids. No swallow tx indicated at this time. Dysphagia Outcome Severity Scale: Level 7: Normal in all situations     Treatment Plan  Requires SLP Intervention: No     D/C Recommendations: No follow up therapy recommended post discharge       Recommended Diet and Intervention   Regular texture/thin liquids         General  Chart Reviewed: Yes  Comments: Pt referred for ST evaluation s/p hospitaliztion for TIA. Per Pt/family, speech and language symptoms have resolved at time of evaluation. Pt demo no dysarthria or anomia at this time. Behavior/Cognition: Alert; Cooperative  O2 Device: None (Room air)  Communication Observation: Functional  Follows Directions: Complex  Dentition: Adequate  Patient Positioning: Upright in bed  Baseline Vocal Quality: Normal  Volitional Cough: Strong  Prior Dysphagia History: n/a  Consistencies Administered: Regular; Thin - cup    Vision/Hearing  Vision  Vision: Within Functional Limits  Hearing  Hearing: Within functional limits    Oral Motor Deficits  Oral/Motor  Oral Hygiene: Moist;Clean    Oral Phase Dysfunction  Oral Phase  Oral Phase: WFL     Indicators of Pharyngeal Phase Dysfunction   Pharyngeal Phase   Pharyngeal Phase: WFL    Prognosis  Individuals consulted  Consulted and agree with results and recommendations: Patient;RN  RN Name: Nickolas Munoz  Patient Education: Pt educated re: results of assessment and diet recommendations.   Patient Education Response: Verbalizes understanding             Therapy Time  SLP Individual Minutes  Time In: 3786  Time Out: 1692  Minutes: Sudeep Chilel Estephania Gallardo M.S., CCC-SLP   10/2/2022 3:51 PM

## 2022-10-03 ENCOUNTER — APPOINTMENT (OUTPATIENT)
Dept: NON INVASIVE DIAGNOSTICS | Age: 67
DRG: 199 | End: 2022-10-03
Payer: COMMERCIAL

## 2022-10-03 PROBLEM — Q21.12 PFO (PATENT FORAMEN OVALE): Status: ACTIVE | Noted: 2022-10-03

## 2022-10-03 LAB
ANION GAP SERPL CALCULATED.3IONS-SCNC: 9 MMOL/L (ref 9–17)
BUN BLDV-MCNC: 18 MG/DL (ref 8–23)
CALCIUM SERPL-MCNC: 9.2 MG/DL (ref 8.6–10.4)
CHLORIDE BLD-SCNC: 107 MMOL/L (ref 98–107)
CHOLESTEROL/HDL RATIO: 4.7
CHOLESTEROL: 220 MG/DL
CO2: 25 MMOL/L (ref 20–31)
CREAT SERPL-MCNC: 0.84 MG/DL (ref 0.5–0.9)
EKG ATRIAL RATE: 82 BPM
EKG P AXIS: 32 DEGREES
EKG P-R INTERVAL: 152 MS
EKG Q-T INTERVAL: 414 MS
EKG QRS DURATION: 92 MS
EKG QTC CALCULATION (BAZETT): 483 MS
EKG R AXIS: -44 DEGREES
EKG T AXIS: 31 DEGREES
EKG VENTRICULAR RATE: 82 BPM
GFR AFRICAN AMERICAN: >60 ML/MIN
GFR NON-AFRICAN AMERICAN: >60 ML/MIN
GFR SERPL CREATININE-BSD FRML MDRD: ABNORMAL ML/MIN/{1.73_M2}
GLUCOSE BLD-MCNC: 133 MG/DL (ref 70–99)
HCT VFR BLD CALC: 41 % (ref 36–46)
HDLC SERPL-MCNC: 47 MG/DL
HEMOGLOBIN: 14.2 G/DL (ref 12–16)
LDL CHOLESTEROL: 141 MG/DL (ref 0–130)
LV EF: 55 %
LVEF MODALITY: NORMAL
MCH RBC QN AUTO: 29.9 PG (ref 26–34)
MCHC RBC AUTO-ENTMCNC: 34.7 G/DL (ref 31–37)
MCV RBC AUTO: 86 FL (ref 80–100)
PDW BLD-RTO: 13.8 % (ref 11.5–14.9)
PLATELET # BLD: 296 K/UL (ref 150–450)
PMV BLD AUTO: 7.4 FL (ref 6–12)
POC CREATININE: 0.8 MG/DL (ref 0.6–1.4)
POTASSIUM SERPL-SCNC: 4.2 MMOL/L (ref 3.7–5.3)
RBC # BLD: 4.77 M/UL (ref 4–5.2)
SODIUM BLD-SCNC: 141 MMOL/L (ref 135–144)
TRIGL SERPL-MCNC: 162 MG/DL
WBC # BLD: 5.6 K/UL (ref 3.5–11)

## 2022-10-03 PROCEDURE — G0378 HOSPITAL OBSERVATION PER HR: HCPCS

## 2022-10-03 PROCEDURE — 99239 HOSP IP/OBS DSCHRG MGMT >30: CPT | Performed by: INTERNAL MEDICINE

## 2022-10-03 PROCEDURE — 80061 LIPID PANEL: CPT

## 2022-10-03 PROCEDURE — 2580000003 HC RX 258: Performed by: INTERNAL MEDICINE

## 2022-10-03 PROCEDURE — 93306 TTE W/DOPPLER COMPLETE: CPT

## 2022-10-03 PROCEDURE — 96372 THER/PROPH/DIAG INJ SC/IM: CPT

## 2022-10-03 PROCEDURE — 80048 BASIC METABOLIC PNL TOTAL CA: CPT

## 2022-10-03 PROCEDURE — 99233 SBSQ HOSP IP/OBS HIGH 50: CPT | Performed by: PSYCHIATRY & NEUROLOGY

## 2022-10-03 PROCEDURE — 85027 COMPLETE CBC AUTOMATED: CPT

## 2022-10-03 PROCEDURE — 93010 ELECTROCARDIOGRAM REPORT: CPT | Performed by: INTERNAL MEDICINE

## 2022-10-03 PROCEDURE — 2060000000 HC ICU INTERMEDIATE R&B

## 2022-10-03 PROCEDURE — 36415 COLL VENOUS BLD VENIPUNCTURE: CPT

## 2022-10-03 PROCEDURE — 6360000002 HC RX W HCPCS: Performed by: INTERNAL MEDICINE

## 2022-10-03 PROCEDURE — 6370000000 HC RX 637 (ALT 250 FOR IP): Performed by: STUDENT IN AN ORGANIZED HEALTH CARE EDUCATION/TRAINING PROGRAM

## 2022-10-03 PROCEDURE — 83036 HEMOGLOBIN GLYCOSYLATED A1C: CPT

## 2022-10-03 RX ORDER — ASPIRIN 81 MG/1
81 TABLET ORAL DAILY
Qty: 30 TABLET | Refills: 3 | Status: SHIPPED | OUTPATIENT
Start: 2022-10-04

## 2022-10-03 RX ORDER — LISINOPRIL AND HYDROCHLOROTHIAZIDE 20; 12.5 MG/1; MG/1
1 TABLET ORAL DAILY
Qty: 180 TABLET | Refills: 1 | Status: SHIPPED | OUTPATIENT
Start: 2022-10-03

## 2022-10-03 RX ORDER — ATORVASTATIN CALCIUM 80 MG/1
80 TABLET, FILM COATED ORAL NIGHTLY
Qty: 30 TABLET | Refills: 3 | Status: SHIPPED | OUTPATIENT
Start: 2022-10-03

## 2022-10-03 RX ORDER — CLOPIDOGREL BISULFATE 75 MG/1
75 TABLET ORAL DAILY
Qty: 20 TABLET | Refills: 0 | Status: SHIPPED | OUTPATIENT
Start: 2022-10-04 | End: 2022-10-21

## 2022-10-03 RX ADMIN — ENOXAPARIN SODIUM 40 MG: 100 INJECTION SUBCUTANEOUS at 08:41

## 2022-10-03 RX ADMIN — SODIUM CHLORIDE, PRESERVATIVE FREE 10 ML: 5 INJECTION INTRAVENOUS at 21:20

## 2022-10-03 RX ADMIN — GABAPENTIN 100 MG: 100 CAPSULE ORAL at 21:18

## 2022-10-03 RX ADMIN — ASPIRIN 81 MG: 81 TABLET, COATED ORAL at 08:41

## 2022-10-03 RX ADMIN — DULOXETINE HYDROCHLORIDE 30 MG: 30 CAPSULE, DELAYED RELEASE ORAL at 21:18

## 2022-10-03 RX ADMIN — ATORVASTATIN CALCIUM 80 MG: 80 TABLET, FILM COATED ORAL at 21:18

## 2022-10-03 RX ADMIN — SODIUM CHLORIDE, PRESERVATIVE FREE 10 ML: 5 INJECTION INTRAVENOUS at 08:42

## 2022-10-03 RX ADMIN — CLOPIDOGREL BISULFATE 75 MG: 75 TABLET ORAL at 08:41

## 2022-10-03 RX ADMIN — PANTOPRAZOLE SODIUM 40 MG: 40 TABLET, DELAYED RELEASE ORAL at 21:18

## 2022-10-03 ASSESSMENT — ENCOUNTER SYMPTOMS
EYE ITCHING: 0
RHINORRHEA: 0
SORE THROAT: 0
EYE DISCHARGE: 0
COUGH: 0
BACK PAIN: 0
NAUSEA: 0
SHORTNESS OF BREATH: 0
COLOR CHANGE: 0
ABDOMINAL PAIN: 0
VOMITING: 0

## 2022-10-03 NOTE — PROGRESS NOTES
Physical Therapy        Physical Therapy Cancel Note      DATE: 10/3/2022    NAME: Vineet Connolly  MRN: [id number]   : 1955      Patient not seen this date for Physical Therapy due to:    Patient at baseline functional level with no acute PT needs. Will defer PT evaluation at this time. Please reorder PT if future needs arise.        Electronically signed by Bethany Araujo PT on 10/3/2022 at 9:56 AM

## 2022-10-03 NOTE — CARE COORDINATION
CASE MANAGEMENT NOTE:    Admission Date:  10/2/2022 Master Baez is a 61 y.o.  female    Admitted for : TIA (transient ischemic attack) [G45.9]    Met with:  Patient    PCP:  Marjorie Zurita MD                                Insurance:  [facility]      Is patient alert and oriented at time of discussion:  Yes    Current Residence/ Living Arrangements:  independently at home             Current Services PTA:  No    Does patient go to outpatient dialysis: No  If yes, location and chair time:   Who is their nephrologist?     Is patient agreeable to VNS: No    Freedom of choice provided:  NA    List of [address] provided: NA    VNS chosen:  NA    DME:  none    Home Oxygen: No    Nebulizer: No    CPAP/BIPAP: No    Supplier: N/A    Potential Assistance Needed: No    SNF needed: No    Freedom of choice and list provided: NA    Pharmacy:  [facility]       Is patient currently receiving oral anticoagulation therapy? No    Is the Patient an BLAYNE TORRES [facility] with Readmission Risk Score greater than 14%? No  If yes, pt needs a follow up appointment made within 7 days.     Family Members/Caregivers that pt would like involved in their care:  Yes    If yes, list name here:  Adams Swain and [facility] [address] [address]    Transportation Provider:  Patient             Discharge Plan:  10/3/2022 [facility]; independently at home alone; DME none; VNS denies needs; will continue to follow//KR                         Electronically signed by: Len Haider RN on 10/3/2022 at 11:39 AM

## 2022-10-03 NOTE — PROGRESS NOTES
Patient stroke work-up was unremarkable, patient echo came back with positive bubble study. Will page cardiology for further recommendation. Discussed with the patient about staying for 1 more day till cardiology clearance, patient understands and agrees with the plan. RN was updated about the plan.

## 2022-10-03 NOTE — PROGRESS NOTES
10/03/22 1317   Encounter Summary   Encounter Overview/Reason   Encounter   Service Provided For: Patient   Referral/Consult From: Rounding   Last Encounter  10/03/22   Spiritual/Emotional needs   Type Spiritual Support   Rituals, Rites and Sacraments   Type Anointing  (Beatriz Sharif 10/3/22)

## 2022-10-03 NOTE — H&P
[address] [facility] [address] [address]      [address] [facility]     HISTORY AND PHYSICAL EXAMINATION            Date:   10/3/2022  Patient name:  Mateusz Banerjee  Date of admission:  10/2/2022 11:27 AM  MRN:   [id number]  Account:  [de-identified]  YOB: 1955  PCP:    Robert Mason MD  Room:   [id number]  Code Status:    Full Code    Chief Complaint:     Chief Complaint   Patient presents with    Aphasia    Headache     7       History Obtained From:     patient, family member     History of Present Illness:   Patient came in from the mobile stroke unit after having an episode of slurring of speech and right facial droop with left tongue deviation which lasted for around 30 minutes. Patient had a systolic blood pressure of above 200 in the mobile stroke unit CT taken also indicated showed no bleeding or any acute infarcts. CTA of the head and neck was unremarkable. She had a headache with the episode as well but that improved with blood pressure control. Patient was noncompliant with her medication, although she claims she took her blood pressure medication yesterday. She is not taking any statins or blood thinners before the episode.       Past Medical History:     Past Medical History:   Diagnosis Date    Anxiety     Cancer ([facility] [address] 2014    right breast x 2, salivary gland    Cerebral artery occlusion with cerebral infarction ([facility] [address]     Depression     GERD (gastroesophageal reflux disease)     Hyperlipidemia     Hypertension     \"sometimes have high bloodpressure\" no treatment    Osteoarthritis     Peripheral vascular disease (HCC)     PONV (postoperative nausea and vomiting)     Shortness of breath     cannot climb 1 flight of stairs without becoming SOB, can do her own housework but she needs to rest during    Snores     Urinary frequency         Past SurgicalHistory:     Past Surgical History:   Procedure Laterality Date    BREAST RECONSTRUCTION Right 2014    BREAST SURGERY Right     breast lumpectomy with chemo and radiation    BUNIONECTOMY Left 2015    BUNIONECTOMY Right 10/10/2016    right foot bunionectomy     SECTION      X 1    COLONOSCOPY      COLONOSCOPY N/A 2020    COLONOSCOPY POLYPECTOMY SNARE/COLD BIOPSY performed by Alexa Vizcarra MD at [address]      cataract extraction    FOOT SURGERY Bilateral     JOINT REPLACEMENT Left 2018    knee    LIPOMA RESECTION Right     benign mass removed behind right ear    MASTECTOMY Right 2014    skin sparing with sentinel node biopsy and reconstruction with tissue expander implant    NOSE SURGERY      FL TOTAL KNEE ARTHROPLASTY Left 2018    ROBOTIC LEFT KNEE TOTAL ARTHROPLASTY performed by Junior Murillo MD at [facility]      for cancer    UPPER GASTROINTESTINAL ENDOSCOPY N/A 2020    EGD BIOPSY performed by Alexa Vizcarra MD at [address] [facility] Bilateral     WRIST FRACTURE SURGERY Right 2016    [facility]        Medications Prior to Admission:        Prior to Admission medications    Medication Sig Start Date End Date Taking? Authorizing Provider   aspirin 325 MG EC tablet Take 1 tablet by mouth 2 times daily  Patient not taking: No sig reported 18   Junior Murillo MD   vitamin C (ASCORBIC ACID) 500 MG tablet Take 500 mg by mouth daily    Historical Provider, MD   buprenorphine (BUPRENEX) 15 MCG/HR PTWK Place 1 patch onto the skin once a week. Arin Adams     Historical Provider, MD   mirabegron (MYRBETRIQ) 50 MG TB24 Take 1 tablet by mouth nightly   Patient not taking: Reported on 10/2/2022    Historical Provider, MD   metoprolol succinate (TOPROL XL) 25 MG extended release tablet Take 25 mg by mouth nightly     Historical Provider, MD   pantoprazole (PROTONIX) 40 MG tablet Take 40 mg by mouth nightly     Historical Provider, MD DULoxetine (CYMBALTA) 30 MG extended release capsule Take 30 mg by mouth nightly    Historical Provider, MD   gabapentin (NEURONTIN) 100 MG capsule Take 100 mg by mouth nightly. Nikia Capps Historical Provider, MD   tiZANidine (ZANAFLEX) 4 MG tablet Take 4 mg by mouth nightly    Historical Provider, MD   atorvastatin (LIPITOR) 10 MG tablet Take 10 mg by mouth  Patient not taking: Reported on 10/2/2022    Historical Provider, MD   RESTASIS 0.05 % ophthalmic emulsion INSTILL ONE DROP INTO BOTH EYES TWICE DAILY 9/9/16   Historical Provider, MD   ALPRAZolam Oni Cortés) 0.5 MG tablet Take 0.5 mg by mouth nightly  Patient not taking: Reported on 10/2/2022    Historical Provider, MD   celecoxib (CELEBREX) 200 MG capsule Take 200 mg by mouth 2 times daily  8/12/15   Historical Provider, MD   letrozole [facility]) 2.5 MG tablet Take 2.5 mg by mouth nightly Pt not taking  Patient not taking: Reported on 10/2/2022 4/20/15   Historical Provider, MD   RA VITAMIN D-3 1000 UNITS TABS tablet   Take 1,000 Units by mouth 2 times daily  4/8/15   Historical Provider, MD        Allergies:     Latex, Fluoxetine, Oxycodone-acetaminophen, Oxycodone-acetaminophen, and Tape [adhesive tape]    Social History:     Tobacco:    reports that she quit smoking about 36 years ago. Her smoking use included cigarettes. She has never used smokeless tobacco.  Alcohol:      reports no history of alcohol use. Drug Use:  reports no history of drug use. Family History:     Family History   Problem Relation Age of Onset    Cancer Father     Diabetes Brother        Review of Systems:     Positive and Negative as described in HPI. Review of Systems   Constitutional:  Negative for chills and fever. HENT:  Negative for rhinorrhea and sore throat. Eyes:  Negative for discharge and itching. Respiratory:  Negative for cough and shortness of breath. Cardiovascular:  Negative for chest pain. Gastrointestinal:  Negative for abdominal pain, nausea and vomiting. Endocrine: Negative for cold intolerance and heat intolerance. Genitourinary:  Positive for vaginal bleeding. Negative for dysuria, frequency and urgency. Musculoskeletal:  Negative for arthralgias and back pain. Skin:  Negative for color change. Neurological:  Negative for dizziness and light-headedness. Psychiatric/Behavioral:  The patient is nervous/anxious. Physical Exam:   /81   Pulse 80   Temp 98.4 °F (36.9 °C) (Oral)   Resp 18   Ht 5' 1\" (1.549 m)   Wt 179 lb 14.3 oz (81.6 kg)   LMP 10/06/2006 (LMP Unknown)   SpO2 97%   BMI 33.99 kg/m²   Temp (24hrs), Av.1 °F (36.7 °C), Min:97.6 °F (36.4 °C), Max:98.4 °F (36.9 °C)    Recent Labs     10/02/22  1135   POCGLU 129*         Intake/Output Summary (Last 24 hours) at 10/3/2022 1017  Last data filed at 10/3/2022 0815  Gross per 24 hour   Intake 350 ml   Output --   Net 350 ml       Physical Exam  Constitutional:       General: She is not in acute distress. Appearance: She is not ill-appearing. Comments: Blood in mouth     HENT:      Head: Atraumatic. Nose: No congestion. Mouth/Throat:      Mouth: Mucous membranes are moist.   Eyes:      General:         Left eye: No discharge. Pupils: Pupils are equal, round, and reactive to light. Cardiovascular:      Rate and Rhythm: Normal rate and regular rhythm. Heart sounds: No murmur heard. Pulmonary:      Effort: Pulmonary effort is normal. No respiratory distress. Breath sounds: Normal breath sounds. No wheezing. Chest:      Chest wall: No tenderness. Abdominal:      General: Abdomen is flat. Bowel sounds are normal. There is no distension. Palpations: Abdomen is soft. There is no mass. Tenderness: There is no abdominal tenderness. Hernia: No hernia is present. Musculoskeletal:         General: No swelling or tenderness. Cervical back: No rigidity or tenderness. Skin:     Coloration: Skin is not jaundiced or pale. Neurological:      General: No focal deficit present. Mental Status: She is alert and oriented to person, place, and time.    Psychiatric:         Mood and Affect: Mood normal.       Investigations:     Laboratory Testing:  Recent Results (from the past 24 hour(s))   STROKE PANEL    Collection Time: 10/02/22 11:30 AM   Result Value Ref Range    Glucose 132 (H) 70 - 99 mg/dL    BUN 16 8 - 23 mg/dL    Creatinine 0.77 0.50 - 0.90 mg/dL    Calcium 9.6 8.6 - 10.4 mg/dL    Sodium 138 135 - 144 mmol/L    Potassium 4.4 3.7 - 5.3 mmol/L    Chloride 103 98 - 107 mmol/L    CO2 23 20 - 31 mmol/L    Anion Gap 12 9 - 17 mmol/L    GFR Non-African American >60 >60 mL/min    GFR African American >60 >60 mL/min    GFR Comment          WBC 6.7 3.5 - 11.0 k/uL    RBC 5.23 (H) 4.0 - 5.2 m/uL    Hemoglobin 15.5 12.0 - 16.0 g/dL    Hematocrit 45.4 36 - 46 %    MCV 86.8 80 - 100 fL    MCH 29.6 26 - 34 pg    MCHC 34.1 31 - 37 g/dL    RDW 14.3 11.5 - 14.9 %    Platelets 242 418 - 347 k/uL    MPV 7.9 6.0 - 12.0 fL    Total CK 81 26 - 192 U/L    Seg Neutrophils 50 36 - 66 %    Lymphocytes 37 24 - 44 %    Monocytes 9 (H) 1 - 7 %    Eosinophils % 3 0 - 4 %    Basophils 1 0 - 2 %    Segs Absolute 3.40 1.3 - 9.1 k/uL    Absolute Lymph # 2.50 1.0 - 4.8 k/uL    Absolute Mono # 0.60 0.1 - 1.3 k/uL    Absolute Eos # 0.20 0.0 - 0.4 k/uL    Basophils Absolute 0.00 0.0 - 0.2 k/uL    Myoglobin 24 (L) 25 - 58 ng/mL    Protime 12.2 11.8 - 14.6 sec    INR 0.9     PTT 26.4 24.0 - 36.0 sec    Troponin, High Sensitivity 6 0 - 14 ng/L   POC Glucose Fingerstick    Collection Time: 10/02/22 11:35 AM   Result Value Ref Range    POC Glucose 129 (H) 65 - 105 mg/dL   Creatinine W/GFR Point of Care    Collection Time: 10/02/22 11:47 AM   Result Value Ref Range    POC Creatinine 0.59 0.51 - 1.19 mg/dL    GFR Comment >60 >60 mL/min    GFR Non-African American >60 >60 mL/min    GFR Comment         EKG 12 Lead    Collection Time: 10/02/22 12:52 PM   Result Value Ref Range    Ventricular Rate 82 BPM    Atrial Rate 82 BPM    P-R Interval 152 ms    QRS Duration 92 ms    Q-T Interval 414 ms    QTc Calculation (Bazett) 483 ms    P Axis 32 degrees    R Axis -44 degrees    T Axis 31 degrees   Lipid Panel    Collection Time: 10/03/22  5:30 AM   Result Value Ref Range    Cholesterol 220 (H) <200 mg/dL    HDL 47 >40 mg/dL    LDL Cholesterol 141 (H) 0 - 130 mg/dL    Chol/HDL Ratio 4.7 <5    Triglycerides 162 (H) <150 mg/dL   Basic Metabolic Panel w/ Reflex to MG    Collection Time: 10/03/22  5:30 AM   Result Value Ref Range    Glucose 133 (H) 70 - 99 mg/dL    BUN 18 8 - 23 mg/dL    Creatinine 0.84 0.50 - 0.90 mg/dL    Calcium 9.2 8.6 - 10.4 mg/dL    Sodium 141 135 - 144 mmol/L    Potassium 4.2 3.7 - 5.3 mmol/L    Chloride 107 98 - 107 mmol/L    CO2 25 20 - 31 mmol/L    Anion Gap 9 9 - 17 mmol/L    GFR Non-African American >60 >60 mL/min    GFR African American >60 >60 mL/min    GFR Comment         CBC    Collection Time: 10/03/22  5:30 AM   Result Value Ref Range    WBC 5.6 3.5 - 11.0 k/uL    RBC 4.77 4.0 - 5.2 m/uL    Hemoglobin 14.2 12.0 - 16.0 g/dL    Hematocrit 41.0 36 - 46 %    MCV 86.0 80 - 100 fL    MCH 29.9 26 - 34 pg    MCHC 34.7 31 - 37 g/dL    RDW 13.8 11.5 - 14.9 %    Platelets 790 497 - 944 k/uL    MPV 7.4 6.0 - 12.0 fL       Imaging/Diagnostics:  CT Head WO Contrast    Result Date: 10/2/2022  EXAMINATION: CT OF THE HEAD WITHOUT CONTRAST  10/2/2022 10:49 am TECHNIQUE: CT of the head was performed without the administration of intravenous contrast. Automated exposure control, iterative reconstruction, and/or weight based adjustment of the mA/kV was utilized to reduce the radiation dose to as low as reasonably achievable. COMPARISON: CT brain performed 02/28/2014. HISTORY: ORDERING SYSTEM PROVIDED HISTORY: Stroke TECHNOLOGIST PROVIDED HISTORY: Stroke FINDINGS: BRAIN/VENTRICLES: There is no acute intracranial hemorrhage, mass effect, or midline shift.   There are remote right frontal lobe infarcts. Ventricles are symmetric and unremarkable. The infratentorial structures are unremarkable. ORBITS: The visualized portion of the orbits demonstrate no acute abnormality. SINUSES: The visualized paranasal sinuses and mastoid air cells demonstrate no acute abnormality. SOFT TISSUES/SKULL:  No acute abnormality of the visualized skull or soft tissues. No acute intracranial abnormality. Multiple remote right frontal lobe infarcts. Findings were discussed with IAM CROFT at 11:14 am on 10/2/2022. CTA HEAD NECK W CONTRAST    Result Date: 10/2/2022  EXAMINATION: CTA OF THE HEAD AND NECK WITH CONTRAST 10/2/2022 11:52 am: TECHNIQUE: CTA of the head and neck was performed with the administration of intravenous contrast. Multiplanar reformatted images are provided for review. MIP images are provided for review. Stenosis of the internal carotid arteries measured using NASCET criteria. Automated exposure control, iterative reconstruction, and/or weight based adjustment of the mA/kV was utilized to reduce the radiation dose to as low as reasonably achievable. This scan was analyzed using Viz. ai contact LVO. Identification of suspected findings is not for diagnostic use beyond notification. Viz LVO is limited to analysis of imaging data and should not be used in-lieu of full patient evaluation or relied upon to make or confirm diagnosis. COMPARISON: CT brain performed 10/02/2022. HISTORY: ORDERING SYSTEM PROVIDED HISTORY: dysarthria and lower left facial droop TECHNOLOGIST PROVIDED HISTORY: dysarthria and lower left facial droop Decision Support Exception - unselect if not a suspected or confirmed emergency medical condition->Emergency Medical Condition (MA) Reason for Exam: dysarthria and lower left facial droop Additional signs and symptoms: hx of stroke FINDINGS: CTA NECK: AORTIC ARCH/ARCH VESSELS: No dissection or arterial injury.   No significant stenosis of the brachiocephalic or subclavian arteries. CAROTID ARTERIES: No dissection, arterial injury, or hemodynamically significant stenosis by NASCET criteria. VERTEBRAL ARTERIES: No dissection, arterial injury, or significant stenosis. SOFT TISSUES: The lung apices are clear. No cervical or superior mediastinal lymphadenopathy. The larynx and pharynx are unremarkable. No acute abnormality of the salivary and thyroid glands. BONES: No acute osseous abnormality. CTA HEAD: ANTERIOR CIRCULATION: No significant stenosis of the intracranial internal carotid, anterior cerebral, or middle cerebral arteries. No aneurysm. POSTERIOR CIRCULATION: No significant stenosis of the vertebral, basilar, or posterior cerebral arteries. No aneurysm. OTHER: No dural venous sinus thrombosis on this non-dedicated study. BRAIN: No mass effect or midline shift. No extra-axial fluid collection. The gray-white differentiation is maintained. No significant stenosis or evidence for occlusion.        Assessment :      Primary Problem  Hypertensive emergency    Active Hospital Problems    Diagnosis Date Noted    TIA (transient ischemic attack) [G45.9] 10/02/2022     Priority: Medium    Hypertensive emergency [I16.1] 10/02/2022     Priority: Medium    Hypertensive urgency [I16.0] 10/02/2022     Priority: Medium    History of CVA (cerebrovascular accident) [Z86.73] 10/02/2022     Priority: Medium       Plan:     Patient status Admit as inpatient in the  Progressive Unit/Step down    Hypertensive emergency    -CT head and neck without contrast showed no stenosis or occlusion   -CT head without contrast showed no acute abnormality, but multiple remote right frontal lobe infarcts   -MRI brain no acute infarction or intracranial hemorrhage or intracranial mass   -ASA, statin, Plavix started   -Echo ordered   -Start swallowing study passed, SLP evaluation, PT/OT ordered   -Lipid panel shows an elevated cholesterol at 220, LDL of 141, triglycerides of 162  -Neurology on board      Depression and anxiety   -On home dose of duloxetine    DVT Prophylaxis-Lovenox 40 mg  Protonix 40 mg for stomach protection      Consultations:   IP CONSULT TO INTERNAL MEDICINE  IP CONSULT TO NEUROLOGY  IP CONSULT TO SOCIAL WORK    Patient is admitted as inpatient status because of co-morbiditieslisted above, severity of signs and symptoms as outlined, requirement for current medical therapies and most importantly because of direct risk to patient if care not provided in a hospital setting.     Josh Roman MD  10/3/2022  10:17 AM    Copy sent to Dr. Jose Barth MD

## 2022-10-03 NOTE — PROGRESS NOTES
[address] [place] [address]     Progress Note            Date:   10/3/2022  Patient name:  Beena Witt  Date of admission:  10/2/2022 11:27 AM  MRN:   [id number]  Account:  [de-identified]  YOB: 1955  PCP:    Fernando Franklin MD  Room:   2098/2098-01  Code Status:    Full Code    Chief Complaint:     Chief Complaint   Patient presents with    Aphasia    Headache     7       History Obtained From:     patient, family member     History of Present Illness:   Patient came in from the mobile stroke unit after having an episode of slurring of speech and right facial droop with left tongue deviation which lasted for around 30 minutes. Patient had a systolic blood pressure of above 200 in the mobile stroke unit CT taken also indicated showed no bleeding or any acute infarcts. CTA of the head and neck was unremarkable. She had a headache with the episode as well but that improved with blood pressure control. Patient was noncompliant with her medication, although she claims she took her blood pressure medication yesterday. She is not taking any statins or blood thinners before the episode.       Past Medical History:     Past Medical History:   Diagnosis Date    Anxiety     Cancer ([facility] 2014    right breast x 2, salivary gland    Cerebral artery occlusion with cerebral infarction ([facility]     Depression     GERD (gastroesophageal reflux disease)     Hyperlipidemia     Hypertension     \"sometimes have high bloodpressure\" no treatment    Osteoarthritis     Peripheral vascular disease (HCC)     PONV (postoperative nausea and vomiting)     Shortness of breath     cannot climb 1 flight of stairs without becoming SOB, can do her own housework but she needs to rest during    Snores     Urinary frequency         Past SurgicalHistory:     Past Surgical History: Procedure Laterality Date    BREAST RECONSTRUCTION Right 2014    BREAST SURGERY Right     breast lumpectomy with chemo and radiation    BUNIONECTOMY Left 2015    BUNIONECTOMY Right 10/10/2016    right foot bunionectomy     SECTION      X 1    COLONOSCOPY      COLONOSCOPY N/A 2020    COLONOSCOPY POLYPECTOMY SNARE/COLD BIOPSY performed by Feng Arnold MD at [address] [facility] [address]      cataract extraction    FOOT SURGERY Bilateral     JOINT REPLACEMENT Left 2018    knee    LIPOMA RESECTION Right     benign mass removed behind right ear    MASTECTOMY Right 2014    skin sparing with sentinel node biopsy and reconstruction with tissue expander implant    NOSE SURGERY      MT TOTAL KNEE ARTHROPLASTY Left 2018    ROBOTIC LEFT KNEE TOTAL ARTHROPLASTY performed by Rei Pollard MD at [facility]      for cancer    UPPER GASTROINTESTINAL ENDOSCOPY N/A 2020    EGD BIOPSY performed by Feng Arnold MD at [address] [facility] Bilateral     WRIST FRACTURE SURGERY Right 2016    [facility]        Medications Prior to Admission:        Prior to Admission medications    Medication Sig Start Date End Date Taking? Authorizing Provider   aspirin 325 MG EC tablet Take 1 tablet by mouth 2 times daily  Patient not taking: No sig reported 18   Rei Pollard MD   vitamin C (ASCORBIC ACID) 500 MG tablet Take 500 mg by mouth daily    Historical Provider, MD   buprenorphine (BUPRENEX) 15 MCG/HR PTWK Place 1 patch onto the skin once a week. Acosta Hidden     Historical Provider, MD   mirabegron (MYRBETRIQ) 50 MG TB24 Take 1 tablet by mouth nightly   Patient not taking: Reported on 10/2/2022    Historical Provider, MD   metoprolol succinate (TOPROL XL) 25 MG extended release tablet Take 25 mg by mouth nightly     Historical Provider, MD   pantoprazole (PROTONIX) 40 MG tablet Take 40 mg by mouth nightly     Historical Provider, MD   DULoxetine (CYMBALTA) 30 MG extended release capsule Take 30 mg by mouth nightly    Historical Provider, MD   gabapentin (NEURONTIN) 100 MG capsule Take 100 mg by mouth nightly. Nikia Press Historical Provider, MD   tiZANidine (ZANAFLEX) 4 MG tablet Take 4 mg by mouth nightly    Historical Provider, MD   atorvastatin (LIPITOR) 10 MG tablet Take 10 mg by mouth  Patient not taking: Reported on 10/2/2022    Historical Provider, MD   RESTASIS 0.05 % ophthalmic emulsion INSTILL ONE DROP INTO BOTH EYES TWICE DAILY 9/9/16   Historical Provider, MD   ALPRAZolam Oni Cortés) 0.5 MG tablet Take 0.5 mg by mouth nightly  Patient not taking: Reported on 10/2/2022    Historical Provider, MD   celecoxib (CELEBREX) 200 MG capsule Take 200 mg by mouth 2 times daily  8/12/15   Historical Provider, MD   letrozole [facility]) 2.5 MG tablet Take 2.5 mg by mouth nightly Pt not taking  Patient not taking: Reported on 10/2/2022 4/20/15   Historical Provider, MD   RA VITAMIN D-3 1000 UNITS TABS tablet   Take 1,000 Units by mouth 2 times daily  4/8/15   Historical Provider, MD        Allergies:     Latex, Fluoxetine, Oxycodone-acetaminophen, Oxycodone-acetaminophen, and Tape [adhesive tape]    Social History:     Tobacco:    reports that she quit smoking about 36 years ago. Her smoking use included cigarettes. She has never used smokeless tobacco.  Alcohol:      reports no history of alcohol use. Drug Use:  reports no history of drug use. Family History:     Family History   Problem Relation Age of Onset    Cancer Father     Diabetes Brother        Review of Systems:     Positive and Negative as described in HPI. Review of Systems   Constitutional:  Negative for chills and fever. HENT:  Negative for rhinorrhea and sore throat. Eyes:  Negative for discharge and itching. Respiratory:  Negative for cough and shortness of breath. Cardiovascular:  Negative for chest pain. Gastrointestinal:  Negative for abdominal pain, nausea and vomiting.    Endocrine: Negative for cold intolerance and heat intolerance. Genitourinary:  Positive for vaginal bleeding. Negative for dysuria, frequency and urgency. Musculoskeletal:  Negative for arthralgias and back pain. Skin:  Negative for color change. Neurological:  Negative for dizziness and light-headedness. Psychiatric/Behavioral:  The patient is nervous/anxious. Physical Exam:   /81   Pulse 80   Temp 98.4 °F (36.9 °C) (Oral)   Resp 18   Ht 5' 1\" (1.549 m)   Wt 179 lb 14.3 oz (81.6 kg)   LMP 10/06/2006 (LMP Unknown)   SpO2 97%   BMI 33.99 kg/m²   Temp (24hrs), Av.1 °F (36.7 °C), Min:97.6 °F (36.4 °C), Max:98.4 °F (36.9 °C)    Recent Labs     10/02/22  1135   POCGLU 129*         Intake/Output Summary (Last 24 hours) at 10/3/2022 1017  Last data filed at 10/3/2022 0815  Gross per 24 hour   Intake 350 ml   Output --   Net 350 ml       Physical Exam  Constitutional:       General: She is not in acute distress. Appearance: She is not ill-appearing. Comments: Blood in mouth     HENT:      Head: Atraumatic. Nose: No congestion. Mouth/Throat:      Mouth: Mucous membranes are moist.   Eyes:      General:         Left eye: No discharge. Pupils: Pupils are equal, round, and reactive to light. Cardiovascular:      Rate and Rhythm: Normal rate and regular rhythm. Heart sounds: No murmur heard. Pulmonary:      Effort: Pulmonary effort is normal. No respiratory distress. Breath sounds: Normal breath sounds. No wheezing. Chest:      Chest wall: No tenderness. Abdominal:      General: Abdomen is flat. Bowel sounds are normal. There is no distension. Palpations: Abdomen is soft. There is no mass. Tenderness: There is no abdominal tenderness. Hernia: No hernia is present. Musculoskeletal:         General: No swelling or tenderness. Cervical back: No rigidity or tenderness. Skin:     Coloration: Skin is not jaundiced or pale.    Neurological:      General: No focal Atrial Rate 82 BPM    P-R Interval 152 ms    QRS Duration 92 ms    Q-T Interval 414 ms    QTc Calculation (Bazett) 483 ms    P Axis 32 degrees    R Axis -44 degrees    T Axis 31 degrees   Lipid Panel    Collection Time: 10/03/22  5:30 AM   Result Value Ref Range    Cholesterol 220 (H) <200 mg/dL    HDL 47 >40 mg/dL    LDL Cholesterol 141 (H) 0 - 130 mg/dL    Chol/HDL Ratio 4.7 <5    Triglycerides 162 (H) <150 mg/dL   Basic Metabolic Panel w/ Reflex to MG    Collection Time: 10/03/22  5:30 AM   Result Value Ref Range    Glucose 133 (H) 70 - 99 mg/dL    BUN 18 8 - 23 mg/dL    Creatinine 0.84 0.50 - 0.90 mg/dL    Calcium 9.2 8.6 - 10.4 mg/dL    Sodium 141 135 - 144 mmol/L    Potassium 4.2 3.7 - 5.3 mmol/L    Chloride 107 98 - 107 mmol/L    CO2 25 20 - 31 mmol/L    Anion Gap 9 9 - 17 mmol/L    GFR Non-African American >60 >60 mL/min    GFR African American >60 >60 mL/min    GFR Comment         CBC    Collection Time: 10/03/22  5:30 AM   Result Value Ref Range    WBC 5.6 3.5 - 11.0 k/uL    RBC 4.77 4.0 - 5.2 m/uL    Hemoglobin 14.2 12.0 - 16.0 g/dL    Hematocrit 41.0 36 - 46 %    MCV 86.0 80 - 100 fL    MCH 29.9 26 - 34 pg    MCHC 34.7 31 - 37 g/dL    RDW 13.8 11.5 - 14.9 %    Platelets 623 810 - 360 k/uL    MPV 7.4 6.0 - 12.0 fL       Imaging/Diagnostics:  CT Head WO Contrast    Result Date: 10/2/2022  EXAMINATION: CT OF THE HEAD WITHOUT CONTRAST  10/2/2022 10:49 am TECHNIQUE: CT of the head was performed without the administration of intravenous contrast. Automated exposure control, iterative reconstruction, and/or weight based adjustment of the mA/kV was utilized to reduce the radiation dose to as low as reasonably achievable. COMPARISON: CT brain performed 02/28/2014. HISTORY: ORDERING SYSTEM PROVIDED HISTORY: Stroke TECHNOLOGIST PROVIDED HISTORY: Stroke FINDINGS: BRAIN/VENTRICLES: There is no acute intracranial hemorrhage, mass effect, or midline shift. There are remote right frontal lobe infarcts.   Ventricles are symmetric and unremarkable. The infratentorial structures are unremarkable. ORBITS: The visualized portion of the orbits demonstrate no acute abnormality. SINUSES: The visualized paranasal sinuses and mastoid air cells demonstrate no acute abnormality. SOFT TISSUES/SKULL:  No acute abnormality of the visualized skull or soft tissues. No acute intracranial abnormality. Multiple remote right frontal lobe infarcts. Findings were discussed with IAM CROFT at 11:14 am on 10/2/2022. CTA HEAD NECK W CONTRAST    Result Date: 10/2/2022  EXAMINATION: CTA OF THE HEAD AND NECK WITH CONTRAST 10/2/2022 11:52 am: TECHNIQUE: CTA of the head and neck was performed with the administration of intravenous contrast. Multiplanar reformatted images are provided for review. MIP images are provided for review. Stenosis of the internal carotid arteries measured using NASCET criteria. Automated exposure control, iterative reconstruction, and/or weight based adjustment of the mA/kV was utilized to reduce the radiation dose to as low as reasonably achievable. This scan was analyzed using Estorian. ai contact LVO. Identification of suspected findings is not for diagnostic use beyond notification. Viz LVO is limited to analysis of imaging data and should not be used in-lieu of full patient evaluation or relied upon to make or confirm diagnosis. COMPARISON: CT brain performed 10/02/2022. HISTORY: ORDERING SYSTEM PROVIDED HISTORY: dysarthria and lower left facial droop TECHNOLOGIST PROVIDED HISTORY: dysarthria and lower left facial droop Decision Support Exception - unselect if not a suspected or confirmed emergency medical condition->Emergency Medical Condition (MA) Reason for Exam: dysarthria and lower left facial droop Additional signs and symptoms: hx of stroke FINDINGS: CTA NECK: AORTIC ARCH/ARCH VESSELS: No dissection or arterial injury. No significant stenosis of the brachiocephalic or subclavian arteries.  CAROTID ARTERIES: No dissection, arterial injury, or hemodynamically significant stenosis by NASCET criteria. VERTEBRAL ARTERIES: No dissection, arterial injury, or significant stenosis. SOFT TISSUES: The lung apices are clear. No cervical or superior mediastinal lymphadenopathy. The larynx and pharynx are unremarkable. No acute abnormality of the salivary and thyroid glands. BONES: No acute osseous abnormality. CTA HEAD: ANTERIOR CIRCULATION: No significant stenosis of the intracranial internal carotid, anterior cerebral, or middle cerebral arteries. No aneurysm. POSTERIOR CIRCULATION: No significant stenosis of the vertebral, basilar, or posterior cerebral arteries. No aneurysm. OTHER: No dural venous sinus thrombosis on this non-dedicated study. BRAIN: No mass effect or midline shift. No extra-axial fluid collection. The gray-white differentiation is maintained. No significant stenosis or evidence for occlusion.        Assessment :      Primary Problem  Hypertensive emergency    Active Hospital Problems    Diagnosis Date Noted    TIA (transient ischemic attack) [G45.9] 10/02/2022     Priority: Medium    Hypertensive emergency [I16.1] 10/02/2022     Priority: Medium    Hypertensive urgency [I16.0] 10/02/2022     Priority: Medium    History of CVA (cerebrovascular accident) [Z86.73] 10/02/2022     Priority: Medium       Plan:     Patient status Admit as inpatient in the  Progressive Unit/Step down    Hypertensive emergency    -CT head and neck without contrast showed no stenosis or occlusion   -CT head without contrast showed no acute abnormality, but multiple remote right frontal lobe infarcts   -MRI brain no acute infarction or intracranial hemorrhage or intracranial mass   -ASA, statin, Plavix started   -Echo ordered   -Start swallowing study passed, SLP evaluation, PT/OT ordered   -Lipid panel shows an elevated cholesterol at 220, LDL of 141, triglycerides of 162  -Neurology on board      Depression and anxiety   -On home dose of duloxetine    DVT Prophylaxis-Lovenox 40 mg  Protonix 40 mg for stomach protection      Consultations:   IP CONSULT TO INTERNAL MEDICINE  IP CONSULT TO NEUROLOGY  IP CONSULT TO SOCIAL WORK    Patient is admitted as inpatient status because of co-morbiditieslisted above, severity of signs and symptoms as outlined, requirement for current medical therapies and most importantly because of direct risk to patient if care not provided in a hospital setting. Katie Corrales MD  10/3/2022  10:17 AM    Copy sent to Dr. Luz Ramos MD       Attending Physician Statement  I have discussed the care of Isacc Davis with the resident team. I have examined the patient myself and taken ros and hpi , including pertinent history and exam findings,  with the resident. I have reviewed the key elements of all parts of the encounter with the resident. I agree with the assessment, plan and orders as documented by the resident. Principal Problem:    Hypertensive emergency  Active Problems:    TIA (transient ischemic attack)    Hypertensive urgency    History of CVA (cerebrovascular accident)  Resolved Problems:    * No resolved hospital problems.  *      Echo reviewed- positive for PFO- will get cards review before discharge  MRI neg for stroke  D/c pt on plavix for3wk, ASA, statin      Electronically signed by Marie Quintanilla MD

## 2022-10-03 NOTE — PROGRESS NOTES
10/03/22 1145   Encounter Summary   Encounter Overview/Reason  Volunteer Encounter   Service Provided For: Patient   Referral/Consult From: Rounding   Last Encounter  10/03/22  (V)   Complexity of Encounter Low   Spiritual/Emotional needs   Type Spiritual Support   Rituals, Rites and [address]

## 2022-10-03 NOTE — PLAN OF CARE
Problem: Discharge Planning  Goal: Discharge to home or other facility with appropriate resources  Outcome: Progressing  Flowsheets (Taken 10/3/2022 1845)  Discharge to home or other facility with appropriate resources:   Identify barriers to discharge with patient and caregiver   Arrange for needed discharge resources and transportation as appropriate   Refer to discharge planning if patient needs post-hospital services based on physician order or complex needs related to functional status, cognitive ability or social support system     Problem: Safety - Adult  Goal: Free from fall injury  Outcome: Progressing  Flowsheets (Taken 10/3/2022 1845)  Free From Fall Injury: Instruct family/caregiver on patient safety     Problem: Pain  Goal: Verbalizes/displays adequate comfort level or baseline comfort level  Outcome: Progressing  Flowsheets (Taken 10/3/2022 1845)  Verbalizes/displays adequate comfort level or baseline comfort level:   Assess pain using appropriate pain scale   Encourage patient to monitor pain and request assistance     Problem: Chronic Conditions and Co-morbidities  Goal: Patient's chronic conditions and co-morbidity symptoms are monitored and maintained or improved  Outcome: Progressing  Flowsheets (Taken 10/3/2022 1845)  Care Plan - Patient's Chronic Conditions and Co-Morbidity Symptoms are Monitored and Maintained or Improved:   Monitor and assess patient's chronic conditions and comorbid symptoms for stability, deterioration, or improvement   Update acute care plan with appropriate goals if chronic or comorbid symptoms are exacerbated and prevent overall improvement and discharge   Collaborate with multidisciplinary team to address chronic and comorbid conditions and prevent exacerbation or deterioration

## 2022-10-03 NOTE — FLOWSHEET NOTE
10/03/22 1841   Treatment Team Notification   Reason for Communication Evaluate   Team Member Name Dr. Glenn Weems Provider   Method of Communication Page   Notification Time 3048     Cardiology notified of new consult regarding patient's ECHO results and positive bubble study; Dr. Reji Torrez group to see patient in the morning. Electronically signed by Quyen Avendano RN.

## 2022-10-03 NOTE — PROGRESS NOTES
[address]   OCCUPATIONAL THERAPY MISSED TREATMENT NOTE   INPATIENT   Date: 10/3/22  Patient Name: Tiburcio Singh       Room: 9186/3957-27  MRN: [id number]   Account #: [de-identified]    : 1955  (61 y.o.)  Gender: female                 REASON FOR MISSED TREATMENT:  Pt is independent with self care and functional mobility, reports being at baseline. Pt demonstrated IND LB dressing and functional mobility, no LOB. Denies concerns upon discharge. Discontinue OT as no skilled services are identified.  Please reorder if change in status occurs    -    [address] Delano Weiner, OT

## 2022-10-03 NOTE — PROGRESS NOTES
10/03/22 0912   Encounter Summary   Support System [facility]   Plan and Referrals   Plan/Referrals Contacted support as requested per patient/family request Plan  ([facility] [phone])

## 2022-10-03 NOTE — PROGRESS NOTES
NEUROLOGY INPATIENT PROGRESS NOTE    10/3/2022         Rachid Fallon is a  61 y.o. female admitted on 10/2/2022 with  TIA (transient ischemic attack) [G45.9]        Briefly, this is a  61 y.o. female with hx of HTN, HLD, PVD and hx of Rt breast ca s/p xrt and chemotherapyadmitted on 10/2/2022 with c/o headaches and episode of aphasia this morning ~ 7- 8 am.  Mobile stroke was called out. Patient was hypertensive on admit to mobile stroke unit with systolics > 903.,  \"638/820\" as per patient's son. He has witnessed her having inability to speak and with right facial droop lasting for upto an hour or so. CT head on admit the mobile stroke unit was negative for bleed. CTA head/neck unremarkable. Stroke team felt not a candidate for tPA. Patient stated that she has received meds and headaches are significantly improved with blood pressure control. Her prior headache was described as pressure-like moderately severe headache with associated photophobia and nausea. Presently she denies headache, dizziness and vision changes. Denies a speech and swallowing difficulties. Patient had a history of TIAs and prior strokes. Patient was not on any blood thinners or lipid-lowering agents. On admit to ed; she passed bedside swallow and she was loaded with aspirin and Plavix 300 mg daily. 10/3/22: Offers no new complaints; headaches are \"gone\". No current facility-administered medications on file prior to encounter. Current Outpatient Medications on File Prior to Encounter   Medication Sig Dispense Refill    vitamin C (ASCORBIC ACID) 500 MG tablet Take 500 mg by mouth daily      buprenorphine (BUPRENEX) 15 MCG/HR PTWK Place 1 patch onto the skin once a week. .      pantoprazole (PROTONIX) 40 MG tablet Take 40 mg by mouth nightly       DULoxetine (CYMBALTA) 30 MG extended release capsule Take 30 mg by mouth nightly      gabapentin (NEURONTIN) 100 MG capsule Take 100 mg by mouth nightly. Katharina Balling       tiZANidine (ZANAFLEX) 4 MG tablet Take 4 mg by mouth nightly      RESTASIS 0.05 % ophthalmic emulsion INSTILL ONE DROP INTO BOTH EYES TWICE DAILY  4    RA VITAMIN D-3 1000 UNITS TABS tablet   Take 1,000 Units by mouth 2 times daily   0     Allergies: Anne Woodruff is allergic to latex, fluoxetine, oxycodone-acetaminophen, oxycodone-acetaminophen, and tape [adhesive tape].     Past Medical History:   Diagnosis Date    Anxiety     Cancer ([address]     right breast x 2, salivary gland    Cerebral artery occlusion with cerebral infarction ([facility] [address]     Depression     GERD (gastroesophageal reflux disease)     Hyperlipidemia     Hypertension     \"sometimes have high bloodpressure\" no treatment    Osteoarthritis     Peripheral vascular disease ([facility])     PONV (postoperative nausea and vomiting)     Shortness of breath     cannot climb 1 flight of stairs without becoming SOB, can do her own housework but she needs to rest during    Snores     Urinary frequency        Past Surgical History:   Procedure Laterality Date    BREAST RECONSTRUCTION Right 2014    BREAST SURGERY Right     breast lumpectomy with chemo and radiation    BUNIONECTOMY Left 2015    BUNIONECTOMY Right 10/10/2016    right foot bunionectomy     SECTION      X 1    COLONOSCOPY      COLONOSCOPY N/A 2020    COLONOSCOPY POLYPECTOMY SNARE/COLD BIOPSY performed by Edson Hayward MD at [address]      cataract extraction    FOOT SURGERY Bilateral     JOINT REPLACEMENT Left 2018    knee    LIPOMA RESECTION Right     benign mass removed behind right ear    MASTECTOMY Right 2014    skin sparing with sentinel node biopsy and reconstruction with tissue expander implant    NOSE SURGERY      AK TOTAL KNEE ARTHROPLASTY Left 2018    ROBOTIC LEFT KNEE TOTAL ARTHROPLASTY performed by Lorna Weber MD at [facility]. [address]      for cancer    UPPER GASTROINTESTINAL ENDOSCOPY N/A 2020    EGD BIOPSY performed by Keyana Riggs Lizandro Gan MD at [address] [facility] Bilateral     WRIST FRACTURE SURGERY Right 09/21/2016    [facility]     Social History: Erin Baker  reports that she quit smoking about 36 years ago. Her smoking use included cigarettes. She has never used smokeless tobacco. She reports that she does not drink alcohol and does not use drugs. Family History   Problem Relation Age of Onset    Cancer Father     Diabetes Brother        Current Medications:     sodium chloride flush  5-40 mL IntraVENous 2 times per day    enoxaparin  40 mg SubCUTAneous Daily    aspirin  81 mg Oral Daily    clopidogrel  75 mg Oral Daily    atorvastatin  80 mg Oral Nightly    DULoxetine  30 mg Oral Nightly    gabapentin  100 mg Oral Nightly    pantoprazole  40 mg Oral Nightly     PRN Meds include: perflutren lipid microspheres, sodium chloride flush, sodium chloride, ondansetron **OR** ondansetron, polyethylene glycol    ROS:   Constitutional Negative for fever and chills   HEENT Negative for ear discharge, ear pain, nosebleed   Eyes Negative for photophobia, pain and discharge   Respiratory Negative for hemoptysis and sputum   Cardiovascular Negative for orthopnea, claudication and PND   Gastrointestinal Negative for abdominal pain, diarrhea, blood in stool   Musculoskeletal Negative for joint pain, negative for myalgia   Skin Negative for rash or itching   Endo/heme/allergies Negative for polydipsia, environmental allergy   Psychiatric Negative for suicidal ideation.   Patient is not anxious           Objective:   /81   Pulse 80   Temp 98.4 °F (36.9 °C) (Oral)   Resp 18   Ht 5' 1\" (1.549 m)   Wt 179 lb 14.3 oz (81.6 kg)   LMP 10/06/2006 (LMP Unknown)   SpO2 97%   BMI 33.99 kg/m²     Blood pressure range: Systolic (82YXL), XUQ:100 , Min:108 , CMA:028   ; Diastolic (39YEC), RIB:90, Min:81, Max:97      Continuous infusions:    sodium chloride         ON EXAMINATION:  GENERAL  Appears comfortable and in no distress HEENT  NC/ AT   NECK  Supple and no bruits heard   Cardiovascular  S1, S2 heard; radial pulse intact   MENTAL STATUS:  Alert, oriented, intact memory, no confusion, normal speech, normal language, no hallucination or delusion   CRANIAL NERVES: II     -       Pupils reactive b/l., Fundus exam: intact venous pulsations;  Visual fields intact to confrontation  III,IV,VI -  EOMs full, no afferent defect, no                      ANANYA, no ptosis  V     -     Normal facial sensation  VII    -     Normal facial symmetry  VIII   -     Intact hearing  IX,X -     Symmetrical palate  XI    -     Symmetrical shoulder shrug  XII   -     Midline tongue, no atrophy    MOTOR FUNCTION:  Normal bulk, normal tone, normal power;  no involuntary movements, no tremor   SENSORY FUNCTION:  Normal touch, normal pin, normal vibration, normal proprioception   CEREBELLAR FUNCTION:  Intact fine motor control over upper limbs   REFLEX FUNCTION:  Symmetric, no perverted reflex, no Babinski sign   STATION and GAIT Normal station and no ataxia noted          Data:    Lab Results:     Lab Results   Component Value Date    CHOL 220 (H) 10/03/2022    LDLCHOLESTEROL 141 (H) 10/03/2022    HDL 47 10/03/2022    TRIG 162 (H) 10/03/2022    ALT 20 05/24/2018    AST 17 05/24/2018    INR 0.9 10/02/2022     Hematology:  Recent Labs     10/02/22  1130 10/03/22  0530   WBC 6.7 5.6   HGB 15.5 14.2   HCT 45.4 41.0    296   INR 0.9  --      Chemistry:  Recent Labs     10/02/22  1130 10/02/22  1147 10/03/22  0530     --  141   K 4.4  --  4.2     --  107   CO2 23  --  25   GLUCOSE 132*  --  133*   BUN 16  --  18   CREATININE 0.77 0.59 0.84   CALCIUM 9.6  --  9.2     Recent Labs     10/02/22  1130 10/03/22  0530   CHOL  --  220*   HDL  --  47   LDLCHOLESTEROL  --  141*   CHOLHDLRATIO  --  4.7   TRIG  --  162*   CKTOTAL 81  --        No results found for: PHENYTOIN, PHENYTOIN, VALPROATE, CBMZ    EKG 10/2/22; NSR    Diagnostic data reviewed:  CT head 10/2/22: unremarkable. CTA head and neck 10/2/22: No significant stenosis or occlusion. MRI Brain 10/2/22: No acute intracranial abnormalities. Chronic encephalomalacia in right frontal convexity and right corona radiata extending into right lentiform nucleus. Chronic lacunar infarct in right cerebellar hemisphere. Echo (10/3/22): EF > 55%. No segmental wall motion abnormalities seen. Mild left ventricular hypertrophy. Grade I (mild) left ventricular diastolic dysfunction. Positive bubble study suggestive of Rt to Lt interatrial shunt upon Valsalva maneuver. Impression and Plan: Ms. Stephanie Duvall is a 61 y.o. female with   Episode of TIA with uncontrolled hypertension; was loaded with aspirin and Plavix and to continue aspirin 81, Plavix 75 mg daily from today onwards. She was explained that she will take both ASA, Plavix for 3 wk followed by longterm ASA therapy. Also to take atorvastatin for stroke prevention. Echo is with +ve bubble study with Rt to Lt shunt; given the hx of strokes in the past; she would benefit from cardiology consultation for abnl echo with PFO and also for loop recorder placement. Headaches: improved; to take acetaminophen followed by fioricet prn for intractable headache  headaches associated with hypertensive encephalopathy; improving with blood pressure control as per primary team.   MRI brain did not reveal any evidence of cerebral edema or posterior reversible encephalopathy. No evidence of infarction. Hx of Rt hemispheric cva few yrs ago and not on any antiplatelets at home  Hx of right breast carcinoma s/p radiation therapy and chemotherapy  Other comorbid conditions include peripheral vascular disease  Care plan is discussed with patient and her nurse at bedside. Will follow with you.            This note was partially created using voice recognition software and is inherently subject to errors including those of syntax and \"sound alike\" substitutions which may escape proofreading. In such instances, original meaning may be extrapolated by contextual derivation.   Mikala Polanco MD 10/3/2022 12:53 PM

## 2022-10-03 NOTE — PLAN OF CARE
Problem: Discharge Planning  Goal: Discharge to home or other facility with appropriate resources  10/3/2022 0011 by Adrianne Choudhury RN  Outcome: Progressing  Flowsheets (Taken 10/3/2022 0011)  Discharge to home or other facility with appropriate resources:   Identify barriers to discharge with patient and caregiver   Arrange for needed discharge resources and transportation as appropriate   Identify discharge learning needs (meds, wound care, etc)  Note: Patient updated on plan of care ,denies any needs at this time      Problem: Safety - Adult  Goal: Free from fall injury  10/3/2022 0011 by Adrianne Choudhury RN  Outcome: Progressing  Note: Patient remains safe and free from fall during admission      Problem: Pain  Goal: Verbalizes/displays adequate comfort level or baseline comfort level  10/3/2022 0011 by Adrianne Choudhury RN  Outcome: Progressing  Flowsheets (Taken 10/3/2022 0011)  Verbalizes/displays adequate comfort level or baseline comfort level:   Encourage patient to monitor pain and request assistance   Administer analgesics based on type and severity of pain and evaluate response   Assess pain using appropriate pain scale  Note: Patient has no complaints of pain or discomfort

## 2022-10-04 VITALS
DIASTOLIC BLOOD PRESSURE: 79 MMHG | HEIGHT: 61 IN | BODY MASS INDEX: 33.96 KG/M2 | SYSTOLIC BLOOD PRESSURE: 140 MMHG | WEIGHT: 179.9 LBS | RESPIRATION RATE: 16 BRPM | OXYGEN SATURATION: 93 % | TEMPERATURE: 98.2 F | HEART RATE: 78 BPM

## 2022-10-04 LAB
ANION GAP SERPL CALCULATED.3IONS-SCNC: 13 MMOL/L (ref 9–17)
BUN BLDV-MCNC: 13 MG/DL (ref 8–23)
CALCIUM SERPL-MCNC: 9.4 MG/DL (ref 8.6–10.4)
CHLORIDE BLD-SCNC: 106 MMOL/L (ref 98–107)
CO2: 23 MMOL/L (ref 20–31)
CREAT SERPL-MCNC: 0.8 MG/DL (ref 0.5–0.9)
GFR SERPL CREATININE-BSD FRML MDRD: >60 ML/MIN/1.73M2
GLUCOSE BLD-MCNC: 121 MG/DL (ref 70–99)
HCT VFR BLD CALC: 43.1 % (ref 36–46)
HEMOGLOBIN: 14.6 G/DL (ref 12–16)
MCH RBC QN AUTO: 29.1 PG (ref 26–34)
MCHC RBC AUTO-ENTMCNC: 33.8 G/DL (ref 31–37)
MCV RBC AUTO: 86 FL (ref 80–100)
PDW BLD-RTO: 13.6 % (ref 11.5–14.9)
PLATELET # BLD: 301 K/UL (ref 150–450)
PMV BLD AUTO: 7.5 FL (ref 6–12)
POTASSIUM SERPL-SCNC: 4.3 MMOL/L (ref 3.7–5.3)
RBC # BLD: 5.01 M/UL (ref 4–5.2)
SODIUM BLD-SCNC: 142 MMOL/L (ref 135–144)
WBC # BLD: 6.6 K/UL (ref 3.5–11)

## 2022-10-04 PROCEDURE — 85027 COMPLETE CBC AUTOMATED: CPT

## 2022-10-04 PROCEDURE — 6370000000 HC RX 637 (ALT 250 FOR IP): Performed by: STUDENT IN AN ORGANIZED HEALTH CARE EDUCATION/TRAINING PROGRAM

## 2022-10-04 PROCEDURE — 36415 COLL VENOUS BLD VENIPUNCTURE: CPT

## 2022-10-04 PROCEDURE — G0378 HOSPITAL OBSERVATION PER HR: HCPCS

## 2022-10-04 PROCEDURE — 96372 THER/PROPH/DIAG INJ SC/IM: CPT

## 2022-10-04 PROCEDURE — 99239 HOSP IP/OBS DSCHRG MGMT >30: CPT | Performed by: INTERNAL MEDICINE

## 2022-10-04 PROCEDURE — 80048 BASIC METABOLIC PNL TOTAL CA: CPT

## 2022-10-04 PROCEDURE — 2580000003 HC RX 258: Performed by: INTERNAL MEDICINE

## 2022-10-04 PROCEDURE — 6360000002 HC RX W HCPCS: Performed by: INTERNAL MEDICINE

## 2022-10-04 RX ORDER — HYDROCHLOROTHIAZIDE 25 MG/1
12.5 TABLET ORAL DAILY
Status: DISCONTINUED | OUTPATIENT
Start: 2022-10-04 | End: 2022-10-04 | Stop reason: HOSPADM

## 2022-10-04 RX ORDER — LISINOPRIL 20 MG/1
20 TABLET ORAL DAILY
Status: DISCONTINUED | OUTPATIENT
Start: 2022-10-04 | End: 2022-10-04 | Stop reason: HOSPADM

## 2022-10-04 RX ADMIN — LISINOPRIL 20 MG: 20 TABLET ORAL at 09:45

## 2022-10-04 RX ADMIN — SODIUM CHLORIDE, PRESERVATIVE FREE 10 ML: 5 INJECTION INTRAVENOUS at 09:46

## 2022-10-04 RX ADMIN — HYDROCHLOROTHIAZIDE 12.5 MG: 25 TABLET ORAL at 09:45

## 2022-10-04 RX ADMIN — ENOXAPARIN SODIUM 40 MG: 100 INJECTION SUBCUTANEOUS at 09:46

## 2022-10-04 RX ADMIN — CLOPIDOGREL BISULFATE 75 MG: 75 TABLET ORAL at 09:45

## 2022-10-04 RX ADMIN — ASPIRIN 81 MG: 81 TABLET, COATED ORAL at 09:45

## 2022-10-04 NOTE — CONSULTS
[facility]  In Patient Cardiology Consult             Date:   10/4/2022  Patient name: Isacc Davis  Date of admission:  10/2/2022 11:27 AM  MRN:   [id number]  YOB: 1955    Reason for Admission:  TIA    CHIEF COMPLAINT:  TIA     History Obtained From:  pt and chart    HISTORY OF PRESENT ILLNESS:      This is a 61 F who presents due to slurred speech and right facial droop. She also had tongue deviation. It lasted for 30 minutes. She had a systolic blood pressure of 200 in the mobile stroke unit. There is no active bleeding or no acute infarcts. She was diagnosed with acute TIA. She has been noncompliant with her medications. Cardiology was consulted after echo was done which revealed a positive bubble study. She denies any current chest pains, shortness of breath orthopnea, PND, lower extremity edema. Her symptoms have resolved. Her blood pressure has improved. She states she previously followed with Dr. Roger Schaffer.      Past Medical History:    Past Medical History:   Diagnosis Date    Anxiety     Cancer ([facility] [address] 2014    right breast x 2, salivary gland    Cerebral artery occlusion with cerebral infarction ([facility] [address]     Depression     GERD (gastroesophageal reflux disease)     Hyperlipidemia     Hypertension     \"sometimes have high bloodpressure\" no treatment    Osteoarthritis     Peripheral vascular disease (HCC)     PONV (postoperative nausea and vomiting)     Shortness of breath     cannot climb 1 flight of stairs without becoming SOB, can do her own housework but she needs to rest during    Snores     Urinary frequency          Past Surgical History:    Past Surgical History:   Procedure Laterality Date    BREAST RECONSTRUCTION Right 2014    BREAST SURGERY Right     breast lumpectomy with chemo and radiation    BUNIONECTOMY Left 2015    BUNIONECTOMY Right 10/10/2016    right foot bunionectomy     SECTION      X 1    COLONOSCOPY      COLONOSCOPY N/A 2020    COLONOSCOPY POLYPECTOMY SNARE/COLD BIOPSY performed by Anthony Richard MD at [address] [facility]      cataract extraction    FOOT SURGERY Bilateral     JOINT REPLACEMENT Left 2018    knee    LIPOMA RESECTION Right     benign mass removed behind right ear    MASTECTOMY Right 2014    skin sparing with sentinel node biopsy and reconstruction with tissue expander implant    NOSE SURGERY      RI TOTAL KNEE ARTHROPLASTY Left 2018    ROBOTIC LEFT KNEE TOTAL ARTHROPLASTY performed by Blanche Hong MD at [place] [address]      for cancer    UPPER GASTROINTESTINAL ENDOSCOPY N/A 2020    EGD BIOPSY performed by Anthony Richard MD at [address] [facility] Bilateral     WRIST FRACTURE SURGERY Right 2016    [facility] Medications:    Outpatient Medications Marked as Taking for the 10/2/22 encounter [facility] Encounter)   Medication Sig Dispense Refill    aspirin 81 MG EC tablet Take 1 tablet by mouth daily 30 tablet 3    atorvastatin (LIPITOR) 80 MG tablet Take 1 tablet by mouth nightly 30 tablet 3    clopidogrel (PLAVIX) 75 MG tablet Take 1 tablet by mouth daily for 20 days 20 tablet 0    lisinopril-hydroCHLOROthiazide (PRINZIDE;ZESTORETIC) 20-12.5 MG per tablet Take 1 tablet by mouth daily 180 tablet 1        Allergies:  Latex, Fluoxetine, Oxycodone-acetaminophen, Oxycodone-acetaminophen, and Tape Minus Bessie tape]    Social History:    Social History     Socioeconomic History    Marital status:    Tobacco Use    Smoking status: Former     Types: Cigarettes     Quit date: 10/6/1986     Years since quittin.0    Smokeless tobacco: Never   Vaping Use    Vaping Use: Never used   Substance and Sexual Activity    Alcohol use: No    Drug use: No        Family History:   Family History   Problem Relation Age of Onset    Cancer Father     Diabetes Brother        REVIEW OF SYSTEMS:    Constitutional: there has been no unanticipated weight loss. There's been No change in energy level, No change in activity level. Eyes: No visual changes or diplopia. No scleral icterus. ENT: No Headaches, hearing loss or vertigo. No mouth sores or sore throat. Cardiovascular: As HPI  Respiratory: As HPI  Gastrointestinal: No abdominal pain, appetite loss, blood in stools. No change in bowel or bladder habits. Genitourinary: No dysuria, trouble voiding, or hematuria. Musculoskeletal:  No gait disturbance, No weakness or joint complaints. Integumentary: No rash or pruritis. Neurological: No headache, diplopia, change in muscle strength, numbness or tingling. No change in gait, balance, coordination, mood, affect, memory, mentation, behavior. Psychiatric: No anxiety, or depression. Endocrine: No temperature intolerance. No excessive thirst, fluid intake, or urination. No tremor. Hematologic/Lymphatic: No abnormal bruising or bleeding, blood clots or swollen lymph nodes. Allergic/Immunologic: No nasal congestion or hives. PHYSICAL EXAM:    Physical Examination:    BP (!) 140/79   Pulse 78   Temp 98.2 °F (36.8 °C) (Oral)   Resp 16   Ht 5' 1\" (1.549 m)   Wt 179 lb 14.3 oz (81.6 kg)   LMP 10/06/2006 (LMP Unknown)   SpO2 93%   BMI 33.99 kg/m²    Constitutional and General Appearance: alert, cooperative, no distress and appears stated age  [de-identified]: PERRL, no cervical lymphadenopathy. No masses palpable. Normal oral mucosa  Respiratory:  Normal excursion and expansion without use of accessory muscles  Resp Auscultation: Good respiratory effort. No for increased work of breathing. On auscultation: clear  Cardiovascular:  Heart tones are crisp and normal. regular S1 and S2. Murmurs: none  Jugular venous pulsation Normal  Abdomen:  No masses or tenderness  Bowel sounds present  Extremities:   No Cyanosis or Clubbing   Lower extremity edema: none   Skin: Warm and dry  Neurological:  Alert and oriented.   Moves all extremities well  No abnormalities of mood, affect, memory, mentation, or behavior are noted    DATA:    Diagnostics:      EKG:   Results for orders placed or performed during the hospital encounter of 10/02/22   EKG 12 Lead   Result Value Ref Range    Ventricular Rate 82 BPM    Atrial Rate 82 BPM    P-R Interval 152 ms    QRS Duration 92 ms    Q-T Interval 414 ms    QTc Calculation (Bazett) 483 ms    P Axis 32 degrees    R Axis -44 degrees    T Axis 31 degrees    Narrative    Normal sinus rhythm  Left axis deviation  Minimal voltage criteria for LVH, may be normal variant  Abnormal ECG  No previous ECGs available       Echo:  Results for orders placed or performed during the hospital encounter of 10/02/22   ECHO Complete 2D W Doppler W Color    Narrative    [facility]    Transthoracic Echocardiography Report (TTE)     Patient Name Debbie Washington    Date of Study                 10/03/2022                [facility]      Date of      1955  Gender                        Female   Birth      Age          61 year(s)  Race                          Other      Room Number  2098        Height:                       60.63 inch, 154 cm      Corporate ID [id number]    Weight:                       179 pounds, 81 kg   #      Patient Acct [de-identified]   BSA:           1.79 m^2       BMI:      34.16   #                                                                kg/m^2      MR #         [id number]      Sonographer                   Marti Gusman      Accession #  [id number]  Interpreting Physician        Abdoul Chaudhry      Fellow                   Referring Nurse Practitioner      Interpreting             Referring Physician           Kalyan Gongora   Fellow     Type of Study      TTE procedure:2D Echocardiogram, M-Mode, Doppler, Color Doppler, Bubble   Study. Procedure Date  Date: 10/03/2022 Start: 11:27 AM    Study Location: [address] [place] [address]  Technical Quality: [address] [place] [address] 190.     Patient Status: Inpatient    Height: 60.63 inches Weight: 178.59 pounds BSA: 1.79 m^2 BMI: 34.16 kg/m^2    Rhythm: Within normal limits HR: 75 bpm BP: 108/81 mmHg    CONCLUSIONS    Summary  Normal left ventricle size and function with an estimated EF > 55%. No segmental wall motion abnormalities seen. Mild left ventricular hypertrophy. Grade I (mild) left ventricular diastolic dysfunction. Normal right ventricular size and function. Left atrium is normal in size. Right atrium is normal in size. Positive bubble study suggestive of Rt to Lt interatrial shunt upon Valsalva  maneuver. Normal aortic valve structure and function without stenosis or  regurgitation. Normal aortic root dimension. Normal mitral valve structure and function. Trivial mitral regurgitation. Normal tricuspid valve structure and function. Mild tricuspid regurgitation. Estimated right ventricular systolic pressure is 20 mmHg. Normal right  ventricular systolic pressure. IVC not well visualized. No significant pericardial effusion is seen. Signature  ----------------------------------------------------------------------------   Electronically signed by Abdoul Chaudhry(Interpreting physician) on   10/03/2022 03:20 PM  ----------------------------------------------------------------------------    ----------------------------------------------------------------------------   Electronically signed by Marti Gusman(Sonographer) on 10/03/2022 12:00   PM  ----------------------------------------------------------------------------  FINDINGS  Left Atrium  Left atrium is normal in size. Positive bubble study suggestive of interatrial shunt upon Valsalva  maneuver. Left Ventricle  Normal left ventricle size and function with an estimated EF > 55%. No segmental wall motion abnormalities seen. Mild left ventricular hypertrophy. Grade I (mild) left ventricular diastolic dysfunction. Right Atrium  Right atrium is normal in size.   Right Ventricle  Normal right ventricular size and function. Mitral Valve  Normal mitral valve structure and function. Trivial mitral regurgitation. Aortic Valve  Normal aortic valve structure and function without stenosis or  regurgitation. Tricuspid Valve  Normal tricuspid valve structure and function. Mild tricuspid regurgitation. Estimated right ventricular systolic pressure is 20 mmHg. Normal right ventricular systolic pressure. Pulmonic Valve  The pulmonic valve is normal in structure. Trivial pulmonic insufficiency. Pericardial Effusion  No significant pericardial effusion is seen. Miscellaneous  Normal aortic root dimension. E/e' average 7.9  IVC not well visualized.     M-mode / 2D Measurements & Calculations:      LVIDd:4.71 cm(3.7 - 5.6 cm)      Diastolic AKOKER:919 ml   QWZCE:0.84 cm(2.2 - 4.0 cm)      Systolic LGWOSF:77.2 ml   IVSd:1.14 cm(0.6 - 1.1 cm)       Aortic Root:3.3 cm(2.0 - 3.7 cm)   LVPWd:1.2 cm(0.6 - 1.1 cm)       LA Dimension: 3.6 cm(1.9 - 4.0 cm)   Fractional Shortenin.27 %    LA volume/Index: 38.1 ml /21m^2   Calculated LVEF (%): 68.75 %     LVOT:1.8 cm      Mitral:                                Aortic      Peak E-Wave: 0.56 m/s                  Peak Velocity: 1.60 m/s   Peak A-Wave: 0.72 m/s                  Mean Velocity: 1.11 m/s   E/A Ratio: 0.78                        Peak Gradient: 10.24 mmHg   Peak Gradient: 1.27 mmHg               Mean Gradient: 6 mmHg   Mean Gradient: 1 mmHg   Deceleration Time: 239 msec                                          Area (continuity): 1.6 cm^2                                          AV VTI: 31.8 cm      Area (continuity): 2.13 cm^2   Mean Velocity: 0.49 m/s      Tricuspid:                             Pulmonic:      Estimated RVSP: 20 mmHg   Peak TR Velocity: 2.09 m/s   Peak TR Gradient: 17.4724 mmHg   Estimated RA Pressure: 3 mmHg                                          Estimated PASP: 20.47 mmHg     Septal Wall E' velocity:0.07 m/s  Lateral Wall E' velocity:0.08 m/s       Labs: CBC:   Recent Labs     10/03/22  0530 10/04/22  0512   WBC 5.6 6.6   HGB 14.2 14.6   HCT 41.0 43.1    301     BMP:   Recent Labs     10/03/22  0530 10/04/22  0512    142   K 4.2 4.3   CO2 25 23   BUN 18 13   CREATININE 0.84 0.80   LABGLOM >60 >60   GLUCOSE 133* 121*     BNP: No results for input(s): BNP in the last 72 hours. PT/INR:   Recent Labs     10/02/22  1130   PROTIME 12.2   INR 0.9     APTT:  Recent Labs     10/02/22  1130   APTT 26.4     CARDIAC ENZYMES:  Recent Labs     10/02/22  1130   TROPHS 6     FASTING LIPID PANEL:  Lab Results   Component Value Date/Time    HDL 47 10/03/2022 05:30 AM    TRIG 162 10/03/2022 05:30 AM     LIVER PROFILE:No results for input(s): AST, ALT, LABALBU in the last 72 hours. IMPRESSION:      - TIA- MRI no infarct  - H/o CVA  - Possible ASD/PFO  - HTN emergency- improved  - DL    RECOMMENDATIONS:  - on DAPT per neuro  - BP improved  - On lisinopril / hctz  - will plan for SERENA with bubble study and Loop Recorder as outpatient  - she states she would like to do it with us and start to follow with us   - see us in [place] office in 2 weeks to arrange    Discussed with patient and nursing. Thank you for allowing me to participate in the care of this patient, please do not hesitate to call if you have any questions. Lauren Knox DO, [facility], [address], [address], [place] [facility]  [email or web address]  [phone]

## 2022-10-04 NOTE — PLAN OF CARE
Safety maintained, call light is within reach, no s/s or c/o distress, bed is low/locked, side rails are up x2  Problem: Discharge Planning  Goal: Discharge to home or other facility with appropriate resources  10/4/2022 0633 by Valerio Rivera RN  Outcome: Progressing  10/3/2022 1845 by Rita Arriola RN  Outcome: Progressing  Flowsheets (Taken 10/3/2022 1845)  Discharge to home or other facility with appropriate resources:   Identify barriers to discharge with patient and caregiver   Arrange for needed discharge resources and transportation as appropriate   Refer to discharge planning if patient needs post-hospital services based on physician order or complex needs related to functional status, cognitive ability or social support system     Problem: Safety - Adult  Goal: Free from fall injury  10/4/2022 0633 by Valerio Rivera RN  Outcome: Progressing  10/3/2022 1845 by Rita Arriola RN  Outcome: Progressing  Flowsheets (Taken 10/3/2022 1845)  Free From Fall Injury: Instruct family/caregiver on patient safety     Problem: Pain  Goal: Verbalizes/displays adequate comfort level or baseline comfort level  10/4/2022 0633 by Valerio Rivera RN  Outcome: Progressing  10/3/2022 1845 by Rita Arriola RN  Outcome: Progressing  Flowsheets (Taken 10/3/2022 1845)  Verbalizes/displays adequate comfort level or baseline comfort level:   Assess pain using appropriate pain scale   Encourage patient to monitor pain and request assistance     Problem: Chronic Conditions and Co-morbidities  Goal: Patient's chronic conditions and co-morbidity symptoms are monitored and maintained or improved  10/4/2022 0633 by Valerio Rivera RN  Outcome: Progressing  10/3/2022 1845 by Rita Arriola RN  Outcome: Progressing  Flowsheets (Taken 10/3/2022 1845)  Care Plan - Patient's Chronic Conditions and Co-Morbidity Symptoms are Monitored and Maintained or Improved:   Monitor and assess patient's chronic conditions and comorbid symptoms for stability, deterioration, or improvement   Update acute care plan with appropriate goals if chronic or comorbid symptoms are exacerbated and prevent overall improvement and discharge   Collaborate with multidisciplinary team to address chronic and comorbid conditions and prevent exacerbation or deterioration     Problem: ABCDS Injury Assessment  Goal: Absence of physical injury  Outcome: Progressing

## 2022-10-04 NOTE — PROGRESS NOTES
[address] [place] [address]     Progress Note            Date:   10/3/2022  Patient name:  Amy Dye  Date of admission:  10/2/2022 11:27 AM  MRN:   [id number]  Account:  [de-identified]  YOB: 1955  PCP:    Roland Espinosa MD  Room:   2098/2098-01  Code Status:    Full Code    Chief Complaint:     Chief Complaint   Patient presents with    Aphasia    Headache     7     Interval History:    Was seen and examined at bedside today  She is still doing well, has no new complaints and will be seen by cardiology today for possible loop recorder placement  Vitals are otherwise stable  History Obtained From:     patient, family member     History of Present Illness:   Patient came in from the mobile stroke unit after having an episode of slurring of speech and right facial droop with left tongue deviation which lasted for around 30 minutes. Patient had a systolic blood pressure of above 200 in the mobile stroke unit CT taken also indicated showed no bleeding or any acute infarcts. CTA of the head and neck was unremarkable. She had a headache with the episode as well but that improved with blood pressure control. Patient was noncompliant with her medication, although she claims she took her blood pressure medication yesterday. She is not taking any statins or blood thinners before the episode.       Past Medical History:     Past Medical History:   Diagnosis Date    Anxiety     Cancer ([address] 2014    right breast x 2, salivary gland    Cerebral artery occlusion with cerebral infarction ([facility] [address]     Depression     GERD (gastroesophageal reflux disease)     Hyperlipidemia     Hypertension     \"sometimes have high bloodpressure\" no treatment    Osteoarthritis     Peripheral vascular disease (HCC)     PONV (postoperative nausea and vomiting)     Shortness of breath cannot climb 1 flight of stairs without becoming SOB, can do her own housework but she needs to rest during    Snores     Urinary frequency         Past SurgicalHistory:     Past Surgical History:   Procedure Laterality Date    BREAST RECONSTRUCTION Right 2014    BREAST SURGERY Right     breast lumpectomy with chemo and radiation    BUNIONECTOMY Left 2015    BUNIONECTOMY Right 10/10/2016    right foot bunionectomy     SECTION      X 1    COLONOSCOPY      COLONOSCOPY N/A 2020    COLONOSCOPY POLYPECTOMY SNARE/COLD BIOPSY performed by Syd Velarde MD at [facility]      cataract extraction    FOOT SURGERY Bilateral     JOINT REPLACEMENT Left 2018    knee    LIPOMA RESECTION Right     benign mass removed behind right ear    MASTECTOMY Right 2014    skin sparing with sentinel node biopsy and reconstruction with tissue expander implant    NOSE SURGERY      OR TOTAL KNEE ARTHROPLASTY Left 2018    ROBOTIC LEFT KNEE TOTAL ARTHROPLASTY performed [facility] Aysha Peña MD [facility]      for [facility]    UPPER GASTROINTESTINAL ENDOSCOPY N/A 2020    EGD BIOPSY performed by Syd Velarde MD at [address] [place] [address],[address] Bilateral     WRIST FRACTURE SURGERY Right 2016    [facility]        Medications Prior to Admission:        Prior to Admission medications    Medication Sig Start Date End Date Taking? Authorizing Provider   aspirin 325 MG EC tablet Take 1 tablet by mouth 2 times daily  Patient not taking: No sig reported 18   Aysha Peña MD   vitamin C (ASCORBIC ACID) 500 MG tablet Take 500 mg by mouth daily    Historical Provider, MD   buprenorphine (BUPRENEX) 15 MCG/HR PTWK Place 1 patch onto the skin once a week. Lucyann Push     Historical Provider, MD   mirabegron (MYRBETRIQ) 50 MG TB24 Take 1 tablet by mouth nightly   Patient not taking: Reported on 10/2/2022    Historical Provider, MD   metoprolol succinate (TOPROL XL) 25 MG extended release tablet Take 25 mg by mouth nightly     Historical Provider, MD   pantoprazole (PROTONIX) 40 MG tablet Take 40 mg by mouth nightly     Historical Provider, MD   DULoxetine (CYMBALTA) 30 MG extended release capsule Take 30 mg by mouth nightly    Historical Provider, MD   gabapentin (NEURONTIN) 100 MG capsule Take 100 mg by mouth nightly. Augusto Chandler Historical Provider, MD   tiZANidine (ZANAFLEX) 4 MG tablet Take 4 mg by mouth nightly    Historical Provider, MD   atorvastatin (LIPITOR) 10 MG tablet Take 10 mg by mouth  Patient not taking: Reported on 10/2/2022    Historical Provider, MD   RESTASIS 0.05 % ophthalmic emulsion INSTILL ONE DROP INTO BOTH EYES TWICE DAILY 9/9/16   Historical Provider, MD   ALPRAZolam Sol Salas) 0.5 MG tablet Take 0.5 mg by mouth nightly  Patient not taking: Reported on 10/2/2022    Historical Provider, MD   celecoxib (CELEBREX) 200 MG capsule Take 200 mg by mouth 2 times daily  8/12/15   Historical Provider, MD   letrozole [facility]) 2.5 MG tablet Take 2.5 mg by mouth nightly Pt not taking  Patient not taking: Reported on 10/2/2022 4/20/15   Historical Provider, MD   RA VITAMIN D-3 1000 UNITS TABS tablet   Take 1,000 Units by mouth 2 times daily  4/8/15   Historical Provider, MD        Allergies:     Latex, Fluoxetine, Oxycodone-acetaminophen, Oxycodone-acetaminophen, and Tape [adhesive tape]    Social History:     Tobacco:    reports that she quit smoking about 36 years ago. Her smoking use included cigarettes. She has never used smokeless tobacco.  Alcohol:      reports no history of alcohol use. Drug Use:  reports no history of drug use. Family History:     Family History   Problem Relation Age of Onset    Cancer Father     Diabetes Brother        Review of Systems:     Positive and Negative as described in HPI. Review of Systems   Constitutional:  Negative for chills and fever. HENT:  Negative for rhinorrhea and sore throat. Eyes:  Negative for discharge and itching. Respiratory:  Negative for cough and shortness of breath. Cardiovascular:  Negative for chest pain. Gastrointestinal:  Negative for abdominal pain, nausea and vomiting. Endocrine: Negative for cold intolerance and heat intolerance. Genitourinary. Negative for dysuria, frequency and urgency. Musculoskeletal:  Negative for arthralgias and back pain. Skin:  Negative for color change. Neurological:  Negative for dizziness and light-headedness. Psychiatric/Behavioral:  The patient is nervous/anxious. Physical Exam:   /81   Pulse 80   Temp 98.4 °F (36.9 °C) (Oral)   Resp 18   Ht 5' 1\" (1.549 m)   Wt 179 lb 14.3 oz (81.6 kg)   LMP 10/06/2006 (LMP Unknown)   SpO2 97%   BMI 33.99 kg/m²   Temp (24hrs), Av.1 °F (36.7 °C), Min:97.6 °F (36.4 °C), Max:98.4 °F (36.9 °C)    Recent Labs     10/02/22  1135   POCGLU 129*         Intake/Output Summary (Last 24 hours) at 10/3/2022 1017  Last data filed at 10/3/2022 0815  Gross per 24 hour   Intake 350 ml   Output --   Net 350 ml       Physical Exam  Constitutional:       General: She is not in acute distress. Appearance: She is not ill-appearing. HENT:      Head: Atraumatic. Nose: No congestion. Mouth/Throat:      Mouth: Mucous membranes are moist.   Eyes:      General:         Left eye: No discharge. Pupils: Pupils are equal, round, and reactive to light. Cardiovascular:      Rate and Rhythm: Normal rate and regular rhythm. Heart sounds: No murmur heard. Pulmonary:      Effort: Pulmonary effort is normal. No respiratory distress. Breath sounds: Normal breath sounds. No wheezing. Chest:      Chest wall: No tenderness. Abdominal:      General: Abdomen is flat. Bowel sounds are normal. There is no distension. Palpations: Abdomen is soft. There is no mass. Tenderness: There is no abdominal tenderness. Hernia: No hernia is present.    Musculoskeletal:         General: No swelling or tenderness. Cervical back: No rigidity or tenderness. Skin:     Coloration: Skin is not jaundiced or pale. Neurological:      General: No focal deficit present. Mental Status: She is alert and oriented to person, place, and time.    Psychiatric:         Mood and Affect: Mood normal.       Investigations:     Laboratory Testing:  Recent Results (from the past 24 hour(s))   STROKE PANEL    Collection Time: 10/02/22 11:30 AM   Result Value Ref Range    Glucose 132 (H) 70 - 99 mg/dL    BUN 16 8 - 23 mg/dL    Creatinine 0.77 0.50 - 0.90 mg/dL    Calcium 9.6 8.6 - 10.4 mg/dL    Sodium 138 135 - 144 mmol/L    Potassium 4.4 3.7 - 5.3 mmol/L    Chloride 103 98 - 107 mmol/L    CO2 23 20 - 31 mmol/L    Anion Gap 12 9 - 17 mmol/L    GFR Non-African American >60 >60 mL/min    GFR African American >60 >60 mL/min    GFR Comment          WBC 6.7 3.5 - 11.0 k/uL    RBC 5.23 (H) 4.0 - 5.2 m/uL    Hemoglobin 15.5 12.0 - 16.0 g/dL    Hematocrit 45.4 36 - 46 %    MCV 86.8 80 - 100 fL    MCH 29.6 26 - 34 pg    MCHC 34.1 31 - 37 g/dL    RDW 14.3 11.5 - 14.9 %    Platelets 608 983 - 110 k/uL    MPV 7.9 6.0 - 12.0 fL    Total CK 81 26 - 192 U/L    Seg Neutrophils 50 36 - 66 %    Lymphocytes 37 24 - 44 %    Monocytes 9 (H) 1 - 7 %    Eosinophils % 3 0 - 4 %    Basophils 1 0 - 2 %    Segs Absolute 3.40 1.3 - 9.1 k/uL    Absolute Lymph # 2.50 1.0 - 4.8 k/uL    Absolute Mono # 0.60 0.1 - 1.3 k/uL    Absolute Eos # 0.20 0.0 - 0.4 k/uL    Basophils Absolute 0.00 0.0 - 0.2 k/uL    Myoglobin 24 (L) 25 - 58 ng/mL    Protime 12.2 11.8 - 14.6 sec    INR 0.9     PTT 26.4 24.0 - 36.0 sec    Troponin, High Sensitivity 6 0 - 14 ng/L   POC Glucose Fingerstick    Collection Time: 10/02/22 11:35 AM   Result Value Ref Range    POC Glucose 129 (H) 65 - 105 mg/dL   Creatinine W/GFR Point of Care    Collection Time: 10/02/22 11:47 AM   Result Value Ref Range    POC Creatinine 0.59 0.51 - 1.19 mg/dL    GFR Comment >60 >60 mL/min    GFR Non- American >60 >60 mL/min    GFR Comment         EKG 12 Lead    Collection Time: 10/02/22 12:52 PM   Result Value Ref Range    Ventricular Rate 82 BPM    Atrial Rate 82 BPM    P-R Interval 152 ms    QRS Duration 92 ms    Q-T Interval 414 ms    QTc Calculation (Bazett) 483 ms    P Axis 32 degrees    R Axis -44 degrees    T Axis 31 degrees   Lipid Panel    Collection Time: 10/03/22  5:30 AM   Result Value Ref Range    Cholesterol 220 (H) <200 mg/dL    HDL 47 >40 mg/dL    LDL Cholesterol 141 (H) 0 - 130 mg/dL    Chol/HDL Ratio 4.7 <5    Triglycerides 162 (H) <150 mg/dL   Basic Metabolic Panel w/ Reflex to MG    Collection Time: 10/03/22  5:30 AM   Result Value Ref Range    Glucose 133 (H) 70 - 99 mg/dL    BUN 18 8 - 23 mg/dL    Creatinine 0.84 0.50 - 0.90 mg/dL    Calcium 9.2 8.6 - 10.4 mg/dL    Sodium 141 135 - 144 mmol/L    Potassium 4.2 3.7 - 5.3 mmol/L    Chloride 107 98 - 107 mmol/L    CO2 25 20 - 31 mmol/L    Anion Gap 9 9 - 17 mmol/L    GFR Non-African American >60 >60 mL/min    GFR African American >60 >60 mL/min    GFR Comment         CBC    Collection Time: 10/03/22  5:30 AM   Result Value Ref Range    WBC 5.6 3.5 - 11.0 k/uL    RBC 4.77 4.0 - 5.2 m/uL    Hemoglobin 14.2 12.0 - 16.0 g/dL    Hematocrit 41.0 36 - 46 %    MCV 86.0 80 - 100 fL    MCH 29.9 26 - 34 pg    MCHC 34.7 31 - 37 g/dL    RDW 13.8 11.5 - 14.9 %    Platelets 469 149 - 591 k/uL    MPV 7.4 6.0 - 12.0 fL       Imaging/Diagnostics:  CT Head WO Contrast    Result Date: 10/2/2022  EXAMINATION: CT OF THE HEAD WITHOUT CONTRAST  10/2/2022 10:49 am TECHNIQUE: CT of the head was performed without the administration of intravenous contrast. Automated exposure control, iterative reconstruction, and/or weight based adjustment of the mA/kV was utilized to reduce the radiation dose to as low as reasonably achievable. COMPARISON: CT brain performed 02/28/2014.  HISTORY: ORDERING SYSTEM PROVIDED HISTORY: Stroke TECHNOLOGIST PROVIDED HISTORY: Stroke FINDINGS: BRAIN/VENTRICLES: There is no acute intracranial hemorrhage, mass effect, or midline shift. There are remote right frontal lobe infarcts. Ventricles are symmetric and unremarkable. The infratentorial structures are unremarkable. ORBITS: The visualized portion of the orbits demonstrate no acute abnormality. SINUSES: The visualized paranasal sinuses and mastoid air cells demonstrate no acute abnormality. SOFT TISSUES/SKULL:  No acute abnormality of the visualized skull or soft tissues. No acute intracranial abnormality. Multiple remote right frontal lobe infarcts. Findings were discussed with IAM CROFT at 11:14 am on 10/2/2022. CTA HEAD NECK W CONTRAST    Result Date: 10/2/2022  EXAMINATION: CTA OF THE HEAD AND NECK WITH CONTRAST 10/2/2022 11:52 am: TECHNIQUE: CTA of the head and neck was performed with the administration of intravenous contrast. Multiplanar reformatted images are provided for review. MIP images are provided for review. Stenosis of the internal carotid arteries measured using NASCET criteria. Automated exposure control, iterative reconstruction, and/or weight based adjustment of the mA/kV was utilized to reduce the radiation dose to as low as reasonably achievable. This scan was analyzed using [employer]. ai contact LVO. Identification of suspected findings is not for diagnostic use beyond notification. Viz LVO is limited to analysis of imaging data and should not be used in-lieu of full patient evaluation or relied upon to make or confirm diagnosis. COMPARISON: CT brain performed 10/02/2022.  HISTORY: ORDERING SYSTEM PROVIDED HISTORY: dysarthria and lower left facial droop TECHNOLOGIST PROVIDED HISTORY: dysarthria and lower left facial droop Decision Support Exception - unselect if not a suspected or confirmed emergency medical condition->Emergency Medical Condition (MA) Reason for Exam: dysarthria and lower left facial droop Additional signs and symptoms: hx of stroke FINDINGS: CTA NECK: AORTIC ARCH/ARCH VESSELS: No dissection or arterial injury. No significant stenosis of the brachiocephalic or subclavian arteries. CAROTID ARTERIES: No dissection, arterial injury, or hemodynamically significant stenosis by NASCET criteria. VERTEBRAL ARTERIES: No dissection, arterial injury, or significant stenosis. SOFT TISSUES: The lung apices are clear. No cervical or superior mediastinal lymphadenopathy. The larynx and pharynx are unremarkable. No acute abnormality of the salivary and thyroid glands. BONES: No acute osseous abnormality. CTA HEAD: ANTERIOR CIRCULATION: No significant stenosis of the intracranial internal carotid, anterior cerebral, or middle cerebral arteries. No aneurysm. POSTERIOR CIRCULATION: No significant stenosis of the vertebral, basilar, or posterior cerebral arteries. No aneurysm. OTHER: No dural venous sinus thrombosis on this non-dedicated study. BRAIN: No mass effect or midline shift. No extra-axial fluid collection. The gray-white differentiation is maintained. No significant stenosis or evidence for occlusion.        Assessment :      Primary Problem  Hypertensive emergency    Active Hospital Problems    Diagnosis Date Noted    TIA (transient ischemic attack) [G45.9] 10/02/2022     Priority: Medium    Hypertensive emergency [I16.1] 10/02/2022     Priority: Medium    Hypertensive urgency [I16.0] 10/02/2022     Priority: Medium    History of CVA (cerebrovascular accident) [Z86.73] 10/02/2022     Priority: Medium       Plan:     Patient status Admit as inpatient in the  Progressive Unit/Step down    Hypertensive emergency    -CT head and neck without contrast showed no stenosis or occlusion   -CT head without contrast showed no acute abnormality, but multiple remote right frontal lobe infarcts   -MRI brain no acute infarction or intracranial hemorrhage or intracranial mass   -ASA, statin, Plavix started   -Echo ordered   -Start swallowing study passed, SLP evaluation, PT/OT ordered   -Lipid panel shows an elevated cholesterol at 220, LDL of 141, triglycerides of 162  -Neurology confirmed TIA, also said would benefit from consult cardiology consultation for possible bubble study  -Echo had positive bubble study, will be seen by cardiology today for possible loop recorder. Depression and anxiety   -On home dose of duloxetine    DVT Prophylaxis-Lovenox 40 mg  Protonix 40 mg for stomach protection      Consultations:   IP CONSULT TO INTERNAL MEDICINE  IP CONSULT TO NEUROLOGY  IP CONSULT TO SOCIAL WORK    Patient is admitted as inpatient status because of co-morbiditieslisted above, severity of signs and symptoms as outlined, requirement for current medical therapies and most importantly because of direct risk to patient if care not provided in a hospital setting. Carlton Lanes, MD  10/3/2022  10:17 AM          Electronically signed by Carlton Lanes, MD     Attending Physician Statement  I have discussed the care of Chelly Mar with the resident team. I have examined the patient myself and taken ros and hpi , including pertinent history and exam findings,  with the resident. I have reviewed the key elements of all parts of the encounter with the resident. I agree with the assessment, plan and orders as documented by the resident. Principal Problem:    Hypertensive emergency  Active Problems:    TIA (transient ischemic attack)    Hypertensive urgency    History of CVA (cerebrovascular accident)    PFO (patent foramen ovale)  Resolved Problems:    * No resolved hospital problems.  *    Appreciate cardiology recommendations, discharge today    Electronically signed by Bonnie Worrell MD

## 2022-10-04 NOTE — DISCHARGE SUMMARY
[address]    Discharge Summary     Patient ID: Vineet Connolly  :  1955   MRN: [id number]     ACCOUNT:  [de-identified]   Patient's PCP: Jose Das MD  Admit Date: 10/2/2022   Discharge Date: 10/4/2022   Length of Stay: 2  Code Status:  Full Code  Admitting Physician: Chris Salcido MD  Discharge Physician: Noah Deshpande MD     Active Discharge Diagnoses:       Primary Problem  Hypertensive emergency      [place] Problems    Diagnosis Date Noted    PFO (patent foramen ovale) [Q21.12] 10/03/2022     Priority: Medium    TIA (transient ischemic attack) [G45.9] 10/02/2022     Priority: Medium    Hypertensive emergency [I16.1] 10/02/2022     Priority: Medium    Hypertensive urgency [I16.0] 10/02/2022     Priority: Medium    History of CVA (cerebrovascular accident) [Z86.73] 10/02/2022     Priority: Medium       Admission Condition:  fair     Discharged Condition: good    Hospital Stay:       Hospital Course:  Vineet Connolly is a 61 y.o. female who was admitted for the management of  TIA and hypertensive encephalopathy. Patient came in from the mobile stroke unit after having an episode of slurring of speech and right facial droop with left tongue deviation which lasted for around 30 minutes. Patient had a systolic blood pressure of above 200 in the mobile stroke unit CT taken also indicated showed no bleeding or any acute infarcts. CTA of the head and neck was unremarkable. She had a headache with the episode as well but that improved with blood pressure control. Patient was noncompliant with her medication    During her hospital course she received stroke medications including aspirin, statin, antihypertensives to drop her blood pressure. She then received a CT scan and MRI which both revealed no acute abnormalities.     He also received an echo which showed a positive bubble study, and she was seen by cardiology was started on outpatient follow-up and she was cleared for discharge          Significant therapeutic interventions: Echo, MRI, CT    Significant Diagnostic Studies:   Labs / Micro:  CBC:   Lab Results   Component Value Date/Time    WBC 6.6 10/04/2022 05:12 AM    RBC 5.01 10/04/2022 05:12 AM    RBC 4.33 12/09/2011 09:12 AM    HGB 14.6 10/04/2022 05:12 AM    HCT 43.1 10/04/2022 05:12 AM    MCV 86.0 10/04/2022 05:12 AM    MCH 29.1 10/04/2022 05:12 AM    MCHC 33.8 10/04/2022 05:12 AM    RDW 13.6 10/04/2022 05:12 AM     10/04/2022 05:12 AM     12/09/2011 09:12 AM     BMP:    Lab Results   Component Value Date/Time    GLUCOSE 121 10/04/2022 05:12 AM     10/04/2022 05:12 AM    K 4.3 10/04/2022 05:12 AM     10/04/2022 05:12 AM    CO2 23 10/04/2022 05:12 AM    ANIONGAP 13 10/04/2022 05:12 AM    BUN 13 10/04/2022 05:12 AM    CREATININE 0.80 10/04/2022 05:12 AM    BUNCRER 26 05/24/2018 11:22 AM    CALCIUM 9.4 10/04/2022 05:12 AM    LABGLOM >60 10/04/2022 05:12 AM    GFRAA >60 10/03/2022 05:30 AM    GFR      10/03/2022 05:30 AM       Radiology:    ECHO Complete 2D W Doppler W Color    Result Date: 10/3/2022  1604 [facility] Transthoracic Echocardiography Report (TTE)  Patient Name Joanne Hernadez    Date of Study                 10/03/2022               [facility]   Date of      1955  Gender                        Female  Birth   Age          61 year(s)  Race                          Other   Room Number  2098        Height:                       60.63 inch, 154 cm   Corporate ID [id number]    Weight:                       179 pounds, 81 kg  #   Patient Acct [de-identified]   BSA:           1.79 m^2       BMI:      34.16  #                                                                kg/m^2   MR #         [id number]      Sonographer                   Marti Gusman   Accession #  [id number]  Interpreting Physician        Caitlyn Baer 63   Fellow                   Referring Nurse Practitioner   Interpreting             Referring Physician           Guido Marie  Type of Study   TTE procedure:2D Echocardiogram, M-Mode, Doppler, Color Doppler, Bubble  Study. Procedure Date Date: 10/03/2022 Start: 11:27 AM Study Location: [address] [place] [address] Technical Quality: [address]. Patient Status: Inpatient Height: 60.63 inches Weight: 178.59 pounds BSA: 1.79 m^2 BMI: 34.16 kg/m^2 Rhythm: Within normal limits HR: 75 bpm BP: 108/81 mmHg CONCLUSIONS Summary Normal left ventricle size and function with an estimated EF > 55%. No segmental wall motion abnormalities seen. Mild left ventricular hypertrophy. Grade I (mild) left ventricular diastolic dysfunction. Normal right ventricular size and function. Left atrium is normal in size. Right atrium is normal in size. Positive bubble study suggestive of Rt to Lt interatrial shunt upon Valsalva maneuver. Normal aortic valve structure and function without stenosis or regurgitation. Normal aortic root dimension. Normal mitral valve structure and function. Trivial mitral regurgitation. Normal tricuspid valve structure and function. Mild tricuspid regurgitation. Estimated right ventricular systolic pressure is 20 mmHg. Normal right ventricular systolic pressure. IVC not well visualized. No significant pericardial effusion is seen. Signature ----------------------------------------------------------------------------  Electronically signed by Will ChaudhryInterpreting physician) on  10/03/2022 03:20 PM ---------------------------------------------------------------------------- ----------------------------------------------------------------------------  Electronically signed by Marti Gusman(Sonographer) on 10/03/2022 12:00  PM ---------------------------------------------------------------------------- FINDINGS Left Atrium Left atrium is normal in size.  Positive bubble study suggestive of interatrial shunt upon Valsalva maneuver. Left Ventricle Normal left ventricle size and function with an estimated EF > 55%. No segmental wall motion abnormalities seen. Mild left ventricular hypertrophy. Grade I (mild) left ventricular diastolic dysfunction. Right Atrium Right atrium is normal in size. Right Ventricle Normal right ventricular size and function. Mitral Valve Normal mitral valve structure and function. Trivial mitral regurgitation. Aortic Valve Normal aortic valve structure and function without stenosis or regurgitation. Tricuspid Valve Normal tricuspid valve structure and function. Mild tricuspid regurgitation. Estimated right ventricular systolic pressure is 20 mmHg. Normal right ventricular systolic pressure. Pulmonic Valve The pulmonic valve is normal in structure. Trivial pulmonic insufficiency. Pericardial Effusion No significant pericardial effusion is seen. Miscellaneous Normal aortic root dimension. E/e' average 7.9 IVC not well visualized.  M-mode / 2D Measurements & Calculations:   LVIDd:4.71 cm(3.7 - 5.6 cm)      Diastolic ROQHFZ:094 ml  WMVUA:3.28 cm(2.2 - 4.0 cm)      Systolic BOWAGV:12.1 ml  HSCM:4.92 cm(0.6 - 1.1 cm)       Aortic Root:3.3 cm(2.0 - 3.7 cm)  LVPWd:1.2 cm(0.6 - 1.1 cm)       LA Dimension: 3.6 cm(1.9 - 4.0 cm)  Fractional Shortenin.27 %    LA volume/Index: 38.1 ml /21m^2  Calculated LVEF (%): 68.75 %     LVOT:1.8 cm   Mitral:                                Aortic   Peak E-Wave: 0.56 m/s                  Peak Velocity: 1.60 m/s  Peak A-Wave: 0.72 m/s                  Mean Velocity: 1.11 m/s  E/A Ratio: 0.78                        Peak Gradient: 10.24 mmHg  Peak Gradient: 1.27 mmHg               Mean Gradient: 6 mmHg  Mean Gradient: 1 mmHg  Deceleration Time: 239 msec                                         Area (continuity): 1.6 cm^2                                         AV VTI: 31.8 cm   Area (continuity): 2.13 cm^2  Mean Velocity: 0.49 m/s   Tricuspid:                             Pulmonic: Estimated RVSP: 20 mmHg  Peak TR Velocity: 2.09 m/s  Peak TR Gradient: 17.4724 mmHg  Estimated RA Pressure: 3 mmHg                                         Estimated PASP: 20.47 mmHg  Septal Wall E' velocity:0.07 m/s Lateral Wall E' velocity:0.08 m/s    CT Head WO Contrast    Result Date: 10/2/2022  EXAMINATION: CT OF THE HEAD WITHOUT CONTRAST  10/2/2022 10:49 am TECHNIQUE: CT of the head was performed without the administration of intravenous contrast. Automated exposure control, iterative reconstruction, and/or weight based adjustment of the mA/kV was utilized to reduce the radiation dose to as low as reasonably achievable. COMPARISON: CT brain performed 02/28/2014. HISTORY: ORDERING SYSTEM PROVIDED HISTORY: Stroke TECHNOLOGIST PROVIDED HISTORY: Stroke FINDINGS: BRAIN/VENTRICLES: There is no acute intracranial hemorrhage, mass effect, or midline shift. There are remote right frontal lobe infarcts. Ventricles are symmetric and unremarkable. The infratentorial structures are unremarkable. ORBITS: The visualized portion of the orbits demonstrate no acute abnormality. SINUSES: The visualized paranasal sinuses and mastoid air cells demonstrate no acute abnormality. SOFT TISSUES/SKULL:  No acute abnormality of the visualized skull or soft tissues. No acute intracranial abnormality. Multiple remote right frontal lobe infarcts. Findings were discussed with IAM CROFT at 11:14 am on 10/2/2022. CTA HEAD NECK W CONTRAST    Result Date: 10/2/2022  EXAMINATION: CTA OF THE HEAD AND NECK WITH CONTRAST 10/2/2022 11:52 am: TECHNIQUE: CTA of the head and neck was performed with the administration of intravenous contrast. Multiplanar reformatted images are provided for review. MIP images are provided for review. Stenosis of the internal carotid arteries measured using NASCET criteria.  Automated exposure control, iterative reconstruction, and/or weight based adjustment of the mA/kV was utilized to reduce the radiation dose to as low as reasonably achievable. This scan was analyzed using Viz. ai contact LVO. Identification of suspected findings is not for diagnostic use beyond notification. Viz LVO is limited to analysis of imaging data and should not be used in-lieu of full patient evaluation or relied upon to make or confirm diagnosis. COMPARISON: CT brain performed 10/02/2022. HISTORY: ORDERING SYSTEM PROVIDED HISTORY: dysarthria and lower left facial droop TECHNOLOGIST PROVIDED HISTORY: dysarthria and lower left facial droop Decision Support Exception - unselect if not a suspected or confirmed emergency medical condition->Emergency Medical Condition (MA) Reason for Exam: dysarthria and lower left facial droop Additional signs and symptoms: hx of stroke FINDINGS: CTA NECK: AORTIC ARCH/ARCH VESSELS: No dissection or arterial injury. No significant stenosis of the brachiocephalic or subclavian arteries. CAROTID ARTERIES: No dissection, arterial injury, or hemodynamically significant stenosis by NASCET criteria. VERTEBRAL ARTERIES: No dissection, arterial injury, or significant stenosis. SOFT TISSUES: The lung apices are clear. No cervical or superior mediastinal lymphadenopathy. The larynx and pharynx are unremarkable. No acute abnormality of the salivary and thyroid glands. BONES: No acute osseous abnormality. CTA HEAD: ANTERIOR CIRCULATION: No significant stenosis of the intracranial internal carotid, anterior cerebral, or middle cerebral arteries. No aneurysm. POSTERIOR CIRCULATION: No significant stenosis of the vertebral, basilar, or posterior cerebral arteries. No aneurysm. OTHER: No dural venous sinus thrombosis on this non-dedicated study. BRAIN: No mass effect or midline shift. No extra-axial fluid collection. The gray-white differentiation is maintained. No significant stenosis or evidence for occlusion.      MRI BRAIN WO CONTRAST    Result Date: 10/2/2022  EXAMINATION: MRI OF THE BRAIN WITHOUT CONTRAST  10/2/2022 3:01 pm TECHNIQUE: Multiplanar multisequence MRI of the brain was performed without the administration of intravenous contrast. COMPARISON: None. HISTORY: ORDERING SYSTEM PROVIDED HISTORY: mild dysarthria, left facial droop TECHNOLOGIST PROVIDED HISTORY: mild dysarthria, left facial droop What is the sedation requirement?->None Reason for Exam: mild dysarthria, left facial droop Additional signs and symptoms: Aphasia; Headache. History if previous stroke. FINDINGS: There is no acute infarction, intracranial hemorrhage, or intracranial mass lesion. No mass effect, midline shift, or extra-axial collection is noted. There are mild nonspecific foci of periventricular and subcortical cerebral white matter T2/FLAIR hyperintensity, most likely representing chronic microangiopathic disease in this age group. Chronic lacunar infarction right cerebellar hemisphere. Chronic encephalomalacia involving the right frontal convexity and right inferior frontal gyrus. Chronic encephalomalacia right corona radiata extending into the right lentiform nucleus. The brain parenchyma is otherwise normal. The pituitary gland is normal in appearance. The cerebellar tonsils are in normal position. The ventricles, sulci, and cisterns are prominent suggestive of generalized volume loss. The intracranial flow voids are preserved. The globes and orbits are within normal limits. Moderate mucosal thickening of the right mastoid air cells. The visualized extracranial structures including paranasal sinuses and mastoid air cells are otherwise unremarkable. There is no acute infarction, intracranial hemorrhage, or intracranial mass lesion. Mild chronic microangiopathic ischemic disease. Mild generalized volume loss. Foci of chronic encephalomalacia as described. Moderate mucosal thickening of right mastoid air cells.  RECOMMENDATIONS: Unavailable         Consultations:    Consults:     Final Specialist Recommendations/Findings:   IP CONSULT TO INTERNAL MEDICINE  IP CONSULT TO NEUROLOGY  IP CONSULT TO SOCIAL WORK  IP CONSULT TO CARDIOLOGY      The patient was seen and examined on day of discharge and this discharge summary is in conjunction with any daily progress note from day of discharge. Discharge plan:       Disposition: Home    Physician Follow Up:     Robert Mason MD  [facility]  [phone]    Follow up  Hospital follow-up, concern of vaginal bleeding rule out cancer    Hernandez Locke MD  [address] [facility], [facility] [address] [facility] [place] [address]          Santino Vanegas [facility] [address] [place] [address] 3  [phone]    Schedule an appointment as soon as possible for a visit in 4 week(s)      DO Marly Townsend [address] [place] [address]    Follow up in 2 week(s)         Requiring Further Evaluation/Follow Up POST HOSPITALIZATION/Incidental Findings:     Diet: regular diet    Activity: As tolerated    Instructions to Patient: Try to stay relaxed and don't think about things that make your blood pressure increase.  Take all your new medications on time    Discharge Medications:      Medication List        START taking these medications      clopidogrel 75 MG tablet  Commonly known as: PLAVIX  Take 1 tablet by mouth daily for 20 days     lisinopril-hydroCHLOROthiazide 20-12.5 MG per tablet  Commonly known as: PRINZIDE;ZESTORETIC  Take 1 tablet by mouth daily            CHANGE how you take these medications      aspirin 81 MG EC tablet  Take 1 tablet by mouth daily  What changed:   medication strength  how much to take  when to take this     atorvastatin 80 MG tablet  Commonly known as: LIPITOR  Take 1 tablet by mouth nightly  What changed:   medication strength  how much to take  when to take this            CONTINUE taking these medications      buprenorphine 15 MCG/HR Ptwk  Commonly known as: BUPRENEX     DULoxetine 30 MG extended release capsule  Commonly known as: CYMBALTA     gabapentin 100 MG capsule  Commonly known as: NEURONTIN     pantoprazole 40 MG tablet  Commonly known as: PROTONIX     RA Vitamin D-3 25 MCG (1000 UT) Tabs tablet  Generic drug: vitamin D     Restasis 0.05 % ophthalmic emulsion  Generic drug: cycloSPORINE     tiZANidine 4 MG tablet  Commonly known as: ZANAFLEX     vitamin C 500 MG tablet  Commonly known as: ASCORBIC ACID            STOP taking these medications      ALPRAZolam 0.5 MG tablet  Commonly known as: XANAX     celecoxib 200 MG capsule  Commonly known as: CELEBREX     letrozole 2.5 MG tablet  Commonly known as: FEMARA     metoprolol succinate 25 MG extended release tablet  Commonly known as: TOPROL XL     mirabegron 50 MG Tb24  Commonly known as: [place] Para               Where to Get Your Medications        These medications were sent to [address] [place] [id number]      Phone: [phone]   aspirin 81 MG EC tablet  atorvastatin 80 MG tablet  clopidogrel 75 MG tablet  lisinopril-hydroCHLOROthiazide 20-12.5 MG per tablet         Time Spent on discharge is  31 mins in patient examination, evaluation, counseling as well as medication reconciliation, prescriptions for required medications, discharge plan and follow up. Electronically signed by   Deisy Reece MD  10/4/2022  11:23 AM      Thank you Dr. Demi Hernandez MD for the opportunity to be involved in this patient's care. Attending Physician Statement  I have discussed the care of Gonzalo New and I have examined the patient myselft and taken ros and hpi , including pertinent history and exam findings,  with the resident. I have reviewed the key elements of all parts of the encounter with the resident. I agree with the assessment, plan and orders as documented by the resident.   I spent approx 35 mins in direct patient care as above and discussing discharge with patient, reviewing medications and counseling for discharge .     Electronically signed by Gardenia Melgar MD

## 2022-10-04 NOTE — PROGRESS NOTES
RN spoke with Dr. Marian Christian with cardiology face to face; Patient able to see them outpatient at [facility] office for SERENA and loop recorder. Electronically signed by Olamide Leblanc RN.

## 2022-10-05 LAB
ESTIMATED AVERAGE GLUCOSE: 131 MG/DL
HBA1C MFR BLD: 6.2 % (ref 4–6)

## 2022-10-18 ENCOUNTER — OFFICE VISIT (OUTPATIENT)
Dept: INTERNAL MEDICINE CLINIC | Age: 67
End: 2022-10-18
Payer: COMMERCIAL

## 2022-10-18 VITALS
SYSTOLIC BLOOD PRESSURE: 124 MMHG | WEIGHT: 183.6 LBS | HEIGHT: 61 IN | DIASTOLIC BLOOD PRESSURE: 76 MMHG | HEART RATE: 91 BPM | OXYGEN SATURATION: 95 % | BODY MASS INDEX: 34.66 KG/M2

## 2022-10-18 DIAGNOSIS — N95.0 POST-MENOPAUSAL BLEEDING: ICD-10-CM

## 2022-10-18 DIAGNOSIS — C50.919 MALIGNANT NEOPLASM OF FEMALE BREAST, UNSPECIFIED ESTROGEN RECEPTOR STATUS, UNSPECIFIED LATERALITY, UNSPECIFIED SITE OF BREAST (HCC): ICD-10-CM

## 2022-10-18 DIAGNOSIS — C07 PRIMARY CANCER OF PAROTID GLAND (HCC): ICD-10-CM

## 2022-10-18 DIAGNOSIS — Z12.31 ENCOUNTER FOR SCREENING MAMMOGRAM FOR BREAST CANCER: ICD-10-CM

## 2022-10-18 DIAGNOSIS — G45.9 TIA (TRANSIENT ISCHEMIC ATTACK): Primary | ICD-10-CM

## 2022-10-18 PROCEDURE — G8419 CALC BMI OUT NRM PARAM NOF/U: HCPCS | Performed by: INTERNAL MEDICINE

## 2022-10-18 PROCEDURE — 1036F TOBACCO NON-USER: CPT | Performed by: INTERNAL MEDICINE

## 2022-10-18 PROCEDURE — 99204 OFFICE O/P NEW MOD 45 MIN: CPT | Performed by: INTERNAL MEDICINE

## 2022-10-18 PROCEDURE — G8482 FLU IMMUNIZE ORDER/ADMIN: HCPCS | Performed by: INTERNAL MEDICINE

## 2022-10-18 PROCEDURE — G8427 DOCREV CUR MEDS BY ELIG CLIN: HCPCS | Performed by: INTERNAL MEDICINE

## 2022-10-18 PROCEDURE — 1111F DSCHRG MED/CURRENT MED MERGE: CPT | Performed by: INTERNAL MEDICINE

## 2022-10-18 PROCEDURE — 3017F COLORECTAL CA SCREEN DOC REV: CPT | Performed by: INTERNAL MEDICINE

## 2022-10-18 RX ORDER — ATORVASTATIN CALCIUM 10 MG/1
TABLET, FILM COATED ORAL
COMMUNITY
Start: 2022-10-12

## 2022-10-18 RX ORDER — IRBESARTAN 300 MG/1
TABLET ORAL
COMMUNITY
Start: 2022-09-17

## 2022-10-18 RX ORDER — OMEPRAZOLE 20 MG/1
CAPSULE, DELAYED RELEASE ORAL
COMMUNITY
Start: 2020-06-29 | End: 2022-10-21 | Stop reason: SINTOL

## 2022-10-18 RX ORDER — TIZANIDINE 4 MG/1
4 TABLET ORAL NIGHTLY PRN
COMMUNITY

## 2022-10-18 RX ORDER — PANTOPRAZOLE SODIUM 40 MG/1
40 TABLET, DELAYED RELEASE ORAL DAILY
COMMUNITY

## 2022-10-18 RX ORDER — DULOXETIN HYDROCHLORIDE 60 MG/1
60 CAPSULE, DELAYED RELEASE ORAL DAILY
COMMUNITY
Start: 2020-06-30

## 2022-10-18 RX ORDER — ASCORBIC ACID 500 MG
500 TABLET ORAL NIGHTLY
COMMUNITY

## 2022-10-18 RX ORDER — MELOXICAM 15 MG/1
15 TABLET ORAL DAILY
COMMUNITY
Start: 2020-06-30

## 2022-10-18 RX ORDER — FLUTICASONE PROPIONATE 50 MCG
SPRAY, SUSPENSION (ML) NASAL
COMMUNITY
Start: 2022-10-13

## 2022-10-18 RX ORDER — CELECOXIB 200 MG/1
200 CAPSULE ORAL DAILY
COMMUNITY

## 2022-10-18 RX ORDER — METOPROLOL SUCCINATE 25 MG/1
25 TABLET, EXTENDED RELEASE ORAL DAILY
COMMUNITY
Start: 2020-06-30

## 2022-10-18 RX ORDER — ALPRAZOLAM 0.5 MG/1
0.5 TABLET ORAL NIGHTLY PRN
COMMUNITY

## 2022-10-18 RX ORDER — FAMOTIDINE 40 MG/1
TABLET, FILM COATED ORAL
COMMUNITY
Start: 2022-08-20

## 2022-10-18 RX ORDER — ASPIRIN 325 MG
325 TABLET ORAL 2 TIMES DAILY
COMMUNITY

## 2022-10-18 RX ORDER — BUPRENORPHINE 5 UG/H
PATCH TRANSDERMAL
COMMUNITY
Start: 2022-09-09

## 2022-10-18 RX ORDER — CYCLOSPORINE 0.5 MG/ML
1 EMULSION OPHTHALMIC EVERY 12 HOURS
COMMUNITY

## 2022-10-18 SDOH — ECONOMIC STABILITY: FOOD INSECURITY: WITHIN THE PAST 12 MONTHS, THE FOOD YOU BOUGHT JUST DIDN'T LAST AND YOU DIDN'T HAVE MONEY TO GET MORE.: NEVER TRUE

## 2022-10-18 SDOH — ECONOMIC STABILITY: FOOD INSECURITY: WITHIN THE PAST 12 MONTHS, YOU WORRIED THAT YOUR FOOD WOULD RUN OUT BEFORE YOU GOT MONEY TO BUY MORE.: NEVER TRUE

## 2022-10-18 ASSESSMENT — PATIENT HEALTH QUESTIONNAIRE - PHQ9
5. POOR APPETITE OR OVEREATING: 0
SUM OF ALL RESPONSES TO PHQ QUESTIONS 1-9: 0
SUM OF ALL RESPONSES TO PHQ QUESTIONS 1-9: 0
10. IF YOU CHECKED OFF ANY PROBLEMS, HOW DIFFICULT HAVE THESE PROBLEMS MADE IT FOR YOU TO DO YOUR WORK, TAKE CARE OF THINGS AT HOME, OR GET ALONG WITH OTHER PEOPLE: 0
SUM OF ALL RESPONSES TO PHQ9 QUESTIONS 1 & 2: 0
SUM OF ALL RESPONSES TO PHQ QUESTIONS 1-9: 0
2. FEELING DOWN, DEPRESSED OR HOPELESS: 0
9. THOUGHTS THAT YOU WOULD BE BETTER OFF DEAD, OR OF HURTING YOURSELF: 0
3. TROUBLE FALLING OR STAYING ASLEEP: 0
4. FEELING TIRED OR HAVING LITTLE ENERGY: 0
6. FEELING BAD ABOUT YOURSELF - OR THAT YOU ARE A FAILURE OR HAVE LET YOURSELF OR YOUR FAMILY DOWN: 0
1. LITTLE INTEREST OR PLEASURE IN DOING THINGS: 0
8. MOVING OR SPEAKING SO SLOWLY THAT OTHER PEOPLE COULD HAVE NOTICED. OR THE OPPOSITE, BEING SO FIGETY OR RESTLESS THAT YOU HAVE BEEN MOVING AROUND A LOT MORE THAN USUAL: 0
SUM OF ALL RESPONSES TO PHQ QUESTIONS 1-9: 0
7. TROUBLE CONCENTRATING ON THINGS, SUCH AS READING THE NEWSPAPER OR WATCHING TELEVISION: 0

## 2022-10-18 ASSESSMENT — SOCIAL DETERMINANTS OF HEALTH (SDOH): HOW HARD IS IT FOR YOU TO PAY FOR THE VERY BASICS LIKE FOOD, HOUSING, MEDICAL CARE, AND HEATING?: NOT HARD AT ALL

## 2022-10-18 NOTE — PROGRESS NOTES
SUBJECTIVE:  Vineet Connolly is a 61 y.o. female who  comes for complaints of   Chief Complaint   Patient presents with    New Patient     Pt here today to get established         Specialities pt is following with:  Has been referred to cardiology- echo which showed a positive bubble study      Chronic conditions being monitored:    Tia  On ASA, plavix, statin  Not set up f/up with neurology    Hypertension   Patient indicates that s/he is feeling well and denies any symptoms referable to elevated blood pressure. - Specifically denies headache, chest pain, palpitations, dyspnea and peripheral edema.  - Patient denies any side effects of their medication(s) and is compliant with their regimen.       Last 3 Encounter BP Readings  BP Readings from Last 3 Encounters:   10/18/22 124/76   10/04/22 (!) 140/79   12/31/20 123/74      Onlisinopril- HCTZ    Vaginal bleed  One episode while in hospitak  Has not set up with gyne  Will order TV US      Concerns today:  Feels well today    Does not smoke, does not drink alcohol      REVIEW OF SYSTEMS (except Subjective (HPI))    CONSTITUTIONAL: No weight loss, malaise or fevers  HEENT: Negative for frequent or significant headaches, No changes in hearing or vision, no nose bleeds or other nasal problems  NECK: Negative for lumps, goiter, pain and significant neck swelling  RESPIRATORY: Negative for cough, hemoptysis, wheezing, or shortness of breath  CARDIOVASCULAR: Negative for chest pain, leg swelling,or palpitations  GI: No nausea, vomiting, or diarrhea or Constipation  : No history of dysuria, frequency or incontinence, or hematuria  MUSCULOSKELETAL: Negative for joint pain or swelling  SKIN: Negative for lesions, rash, and itching  PSYCH: Negative for sleep disturbance, mood disorder and recent psychosocial stressors  NEURO: No history of headaches, syncope, paralysis, seizures or tremors    ACTIVE PROBLEM LIST  Patient Active Problem List   Diagnosis    Fracture Chronic pain    Depression    Hyperlipidemia    Breast cancer in female [facility])    Neoplasm of parotid gland    Osteopenia    Osteoporosis, post-menopausal    Primary cancer of parotid gland (HCC)    Primary osteoarthritis of left knee    TIA (transient ischemic attack)    Hypertensive emergency    Hypertensive urgency    History of CVA (cerebrovascular accident)    PFO (patent foramen ovale)         PAST MEDICAL HISTORY:    Past Medical History:   Diagnosis Date    Anxiety     Cancer ([address]     right breast x 2, salivary gland    Cerebral artery occlusion with cerebral infarction (HCC)     Depression     GERD (gastroesophageal reflux disease)     Hyperlipidemia     Hypertension     \"sometimes have high bloodpressure\" no treatment    Osteoarthritis     Peripheral vascular disease (HCC)     PONV (postoperative nausea and vomiting)     Shortness of breath     cannot climb 1 flight of stairs without becoming SOB, can do her own housework but she needs to rest during    Snores     Urinary frequency        PAST SURGICAL HISTORY:    Past Surgical History:   Procedure Laterality Date    BREAST RECONSTRUCTION Right 2014    BREAST SURGERY Right     breast lumpectomy with chemo and radiation    BUNIONECTOMY Left 2015    BUNIONECTOMY Right 10/10/2016    right foot bunionectomy     SECTION      X 1    COLONOSCOPY      COLONOSCOPY N/A 2020    COLONOSCOPY POLYPECTOMY SNARE/COLD BIOPSY performed by Riri Galdamez MD at [address] [place] [address],[address]      cataract extraction    FOOT SURGERY Bilateral     JOINT REPLACEMENT Left 2018    knee    LIPOMA RESECTION Right     benign mass removed behind right ear    MASTECTOMY Right 2014    skin sparing with sentinel node biopsy and reconstruction with tissue expander implant    NOSE SURGERY      SD TOTAL KNEE ARTHROPLASTY Left 2018    ROBOTIC LEFT KNEE TOTAL ARTHROPLASTY performed by Desiree Flores MD at [facility] [address]      for cancer UPPER GASTROINTESTINAL ENDOSCOPY N/A 2020    EGD BIOPSY performed by Wen Saunders MD at [facility] Bilateral     WRIST FRACTURE SURGERY Right 2016    [facility]       FAMILY HISTORY:    Family History   Problem Relation Age of Onset    Cancer Father     Diabetes Brother         SOCIAL HISTORY:    Social History     Socioeconomic History    Marital status:      Spouse name: Not on file    Number of children: Not on file    Years of education: Not on file    Highest education level: Not on file   Occupational History    Not on file   Tobacco Use    Smoking status: Former     Packs/day: 0.25     Years: 2.00     Pack years: 0.50     Types: Cigarettes     Start date:      Quit date: 10/6/1986     Years since quittin.0    Smokeless tobacco: Never   Vaping Use    Vaping Use: Never used   Substance and Sexual Activity    Alcohol use: No    Drug use: No    Sexual activity: Not on file   Other Topics Concern    Not on file   Social History Narrative    Not on file     Social Determinants of Health     Financial Resource Strain: Low Risk     Difficulty of Paying Living Expenses: Not hard at all   Food Insecurity: No Food Insecurity    Worried About Running Out of Food in the Last Year: Never true    Ran Out of Food in the Last Year: Never true   Transportation Needs: Not on file   Physical Activity: Not on file   Stress: Not on file   Social Connections: Not on file   Intimate Partner Violence: Not on file   Housing Stability: Not on file       CURRENT MEDICATIONS:  Current Outpatient Medications   Medication Sig Dispense Refill    ALPRAZolam (XANAX) 0.5 MG tablet Take 0.5 mg by mouth nightly as needed.       ascorbic acid (VITAMIN C) 500 MG tablet Take 500 mg by mouth nightly      atorvastatin (LIPITOR) 10 MG tablet take 1 tablet by mouth once daily      celecoxib (CELEBREX) 200 MG capsule Take 200 mg by mouth daily      cycloSPORINE (RESTASIS) 0.05 % ophthalmic emulsion Apply 1 drop to eye in the morning and 1 drop in the evening. diclofenac sodium (VOLTAREN) 1 % GEL apply 4 grams to affected area four times a day for 1 month if needed      DULoxetine (CYMBALTA) 60 MG extended release capsule Take 60 mg by mouth daily      famotidine (PEPCID) 40 MG tablet       fluticasone (FLONASE) 50 MCG/ACT nasal spray instill 2 sprays into each nostril once daily      irbesartan (AVAPRO) 300 MG tablet take 1 tablet by mouth once daily      meloxicam (MOBIC) 15 MG tablet Take 15 mg by mouth daily      metoprolol succinate (TOPROL XL) 25 MG extended release tablet Take 25 mg by mouth daily      mirabegron (MYRBETRIQ) 50 MG TB24 Take 50 mg by mouth daily      omeprazole (PRILOSEC) 20 MG delayed release capsule TAKE 1 CAPSULE BY MOUTH ONCE DAILY 30-60 MINUTES BEFORE MEAL      pantoprazole (PROTONIX) 40 MG tablet Take 40 mg by mouth daily      tiZANidine (ZANAFLEX) 4 MG tablet Take 4 mg by mouth nightly as needed      aspirin 81 MG EC tablet Take 1 tablet by mouth daily 30 tablet 3    atorvastatin (LIPITOR) 80 MG tablet Take 1 tablet by mouth nightly 30 tablet 3    clopidogrel (PLAVIX) 75 MG tablet Take 1 tablet by mouth daily for 20 days 20 tablet 0    lisinopril-hydroCHLOROthiazide (PRINZIDE;ZESTORETIC) 20-12.5 MG per tablet Take 1 tablet by mouth daily 180 tablet 1    buprenorphine (BUPRENEX) 15 MCG/HR PTWK Place 1 patch onto the skin once a week. .      DULoxetine (CYMBALTA) 30 MG extended release capsule Take 30 mg by mouth nightly      gabapentin (NEURONTIN) 100 MG capsule Take 100 mg by mouth nightly. .      RESTASIS 0.05 % ophthalmic emulsion INSTILL ONE DROP INTO BOTH EYES TWICE DAILY  4    RA VITAMIN D-3 1000 UNITS TABS tablet   Take 1,000 Units by mouth 2 times daily   0    aspirin 325 MG tablet Take 325 mg by mouth 2 times daily (Patient not taking: Reported on 10/18/2022)      buprenorphine (BUPRENEX) 5 MCG/HR PTWK apply 1 patch to affected area every week for 1 month (Patient not taking: Reported on 10/18/2022)      vitamin C (ASCORBIC ACID) 500 MG tablet Take 500 mg by mouth daily (Patient not taking: Reported on 10/18/2022)      pantoprazole (PROTONIX) 40 MG tablet Take 40 mg by mouth nightly  (Patient not taking: Reported on 10/18/2022)      tiZANidine (ZANAFLEX) 4 MG tablet Take 4 mg by mouth nightly (Patient not taking: Reported on 10/18/2022)       No current facility-administered medications for this visit. OBJECTIVE:  Vitals:    10/18/22 1608   BP: 124/76   Pulse: 91   SpO2: 95%           General exam (except above):  Constitutional - well appearing, alert, in no acute distress  Eyes: Anicteric sclera. Pupils are equally round and reactive to light. Extraocular movements are intact. Skin: Skin color, texture, turgor normal  Respiratory - Lungs clear to auscultation. No wheezing, rhonchi, rales  Cardiovascular - RRR. S1S2 present. No leg swelling. Gastrointestinal - Abdomen soft, non-tender. Bowel sounds normal. No masses, organomegaly  Musculoskeletal - No joint swelling, deformity, or tenderness  Neuro - Pt Alert, awake and oriented x 3. No gross focal neurological deficits      ASSESSMENT AND PLAN (MEDICAL DECISION MAKING):  Mami was seen today for new patient. Diagnoses and all orders for this visit:    TIA (transient ischemic attack)  -     External Referral To Neurology    Encounter for screening mammogram for breast cancer    Malignant neoplasm of female breast, unspecified estrogen receptor status, unspecified laterality, unspecified site of breast ([address]    Primary cancer of parotid gland ([address]    Post-menopausal bleeding  -     US NON OB TRANSVAGINAL;  Future     Pt reports she had a Mamogram           Follow up in: Christiano Loredo MD

## 2022-10-21 RX ORDER — CLOPIDOGREL BISULFATE 75 MG/1
TABLET ORAL
Qty: 20 TABLET | Refills: 0 | Status: SHIPPED | OUTPATIENT
Start: 2022-10-21

## 2022-10-21 NOTE — TELEPHONE ENCOUNTER
I am refilling her plavix.   We need to discontinue her omeprazole it appears she is already on protonix    Los Walsh MD

## 2022-10-21 NOTE — TELEPHONE ENCOUNTER
Please address the medication refill and close the encounter. If I can be of assistance, please route to the applicable pool. Thank you.       Last visit: 10/18/22  Last Med refill: 10/4/22  Does patient have enough medication for 72 hours: No:     Next Visit Date:  Future Appointments   Date Time Provider Katie Diez   10/27/2022  2:30 PM [facility] ULTRASOUND RM [address] Maintenance   Topic Date Due    HIV screen  Never done    Cervical cancer screen  01/09/2021    Breast cancer screen  01/16/2022    COVID-19 Vaccine (5 - Booster for Pfizer series) 09/06/2022    A1C test (Diabetic or Prediabetic)  10/03/2023    Lipids  10/03/2023    Depression Monitoring  10/18/2023    Colorectal Cancer Screen  12/31/2030    DTaP/Tdap/Td vaccine (2 - Td or Tdap) 02/19/2031    Flu vaccine  Completed    Shingles vaccine  Completed    Hepatitis C screen  Completed    Hepatitis A vaccine  Aged Out    Hib vaccine  Aged Out    Meningococcal (ACWY) vaccine  Aged Out    Pneumococcal 0-64 years Vaccine  Aged Out       Hemoglobin A1C (%)   Date Value   10/03/2022 6.2 (H)             ( goal A1C is < 7)   No results found for: LABMICR  LDL Cholesterol (mg/dL)   Date Value   10/03/2022 141 (H)       (goal LDL is <100)   AST (U/L)   Date Value   05/24/2018 17     ALT (U/L)   Date Value   05/24/2018 20     BUN (mg/dL)   Date Value   10/04/2022 13     BP Readings from Last 3 Encounters:   10/18/22 124/76   10/04/22 (!) 140/79   12/31/20 123/74          (goal 120/80)    All Future Testing planned in [facility]  Lab Frequency Next Occurrence   US NON OB TRANSVAGINAL Once 10/18/2022               Patient Active Problem List:     Fracture     Chronic pain     Depression     Hyperlipidemia     Breast cancer in female [facility])     Neoplasm of parotid gland     Osteopenia     Osteoporosis, post-menopausal     Primary cancer of parotid gland (HCC)     Primary osteoarthritis of left knee     TIA (transient ischemic attack) Hypertensive emergency     Hypertensive urgency     History of CVA (cerebrovascular accident)     PFO (patent foramen ovale)

## 2022-10-27 ENCOUNTER — HOSPITAL ENCOUNTER (OUTPATIENT)
Dept: ULTRASOUND IMAGING | Age: 67
Discharge: HOME | End: 2022-10-29
Payer: COMMERCIAL

## 2022-10-27 DIAGNOSIS — N95.0 POST-MENOPAUSAL BLEEDING: ICD-10-CM

## 2022-10-27 PROCEDURE — 76830 TRANSVAGINAL US NON-OB: CPT

## 2022-10-31 ENCOUNTER — TELEPHONE (OUTPATIENT)
Dept: INTERNAL MEDICINE CLINIC | Age: 67
End: 2022-10-31

## 2023-03-16 ENCOUNTER — HOSPITAL ENCOUNTER (EMERGENCY)
Age: 68
Discharge: HOME | End: 2023-03-16
Attending: EMERGENCY MEDICINE
Payer: MEDICARE

## 2023-03-16 ENCOUNTER — APPOINTMENT (OUTPATIENT)
Dept: CT IMAGING | Age: 68
End: 2023-03-16
Payer: MEDICARE

## 2023-03-16 ENCOUNTER — APPOINTMENT (OUTPATIENT)
Dept: GENERAL RADIOLOGY | Age: 68
End: 2023-03-16
Payer: MEDICARE

## 2023-03-16 VITALS
HEART RATE: 84 BPM | OXYGEN SATURATION: 95 % | TEMPERATURE: 98.6 F | HEIGHT: 61 IN | DIASTOLIC BLOOD PRESSURE: 103 MMHG | WEIGHT: 171 LBS | SYSTOLIC BLOOD PRESSURE: 166 MMHG | RESPIRATION RATE: 20 BRPM | BODY MASS INDEX: 32.28 KG/M2

## 2023-03-16 DIAGNOSIS — R07.9 CHEST PAIN, UNSPECIFIED TYPE: ICD-10-CM

## 2023-03-16 DIAGNOSIS — R51.9 INTRACTABLE HEADACHE, UNSPECIFIED CHRONICITY PATTERN, UNSPECIFIED HEADACHE TYPE: ICD-10-CM

## 2023-03-16 DIAGNOSIS — F41.8 SITUATIONAL ANXIETY: ICD-10-CM

## 2023-03-16 DIAGNOSIS — I16.0 HYPERTENSIVE URGENCY: Primary | ICD-10-CM

## 2023-03-16 LAB
ALBUMIN SERPL-MCNC: 4 G/DL (ref 3.5–5.2)
ALBUMIN/GLOBULIN RATIO: 1.2 (ref 1–2.5)
ALP SERPL-CCNC: 87 U/L (ref 35–104)
ALT SERPL-CCNC: 61 U/L (ref 5–33)
ANION GAP SERPL CALCULATED.3IONS-SCNC: 12 MMOL/L (ref 9–17)
AST SERPL-CCNC: 48 U/L
BACTERIA: ABNORMAL
BILIRUB SERPL-MCNC: 0.6 MG/DL (ref 0.3–1.2)
BILIRUBIN URINE: NEGATIVE
BUN SERPL-MCNC: 14 MG/DL (ref 8–23)
CALCIUM SERPL-MCNC: 9.2 MG/DL (ref 8.6–10.4)
CHLORIDE SERPL-SCNC: 105 MMOL/L (ref 98–107)
CO2 SERPL-SCNC: 24 MMOL/L (ref 20–31)
COLOR: YELLOW
CREAT SERPL-MCNC: 0.74 MG/DL (ref 0.5–0.9)
EPITHELIAL CELLS UA: ABNORMAL /HPF (ref 0–5)
GFR SERPL CREATININE-BSD FRML MDRD: >60 ML/MIN/1.73M2
GLUCOSE SERPL-MCNC: 102 MG/DL (ref 70–99)
GLUCOSE UR STRIP.AUTO-MCNC: NEGATIVE MG/DL
HCT VFR BLD AUTO: 43.9 % (ref 36–46)
HGB BLD-MCNC: 14.8 G/DL (ref 12–16)
KETONES UR STRIP.AUTO-MCNC: NEGATIVE MG/DL
LEUKOCYTE ESTERASE UR QL STRIP.AUTO: ABNORMAL
MCH RBC QN AUTO: 29.3 PG (ref 26–34)
MCHC RBC AUTO-ENTMCNC: 33.7 G/DL (ref 31–37)
MCV RBC AUTO: 86.8 FL (ref 80–100)
NITRITE UR QL STRIP.AUTO: NEGATIVE
OTHER OBSERVATIONS UA: ABNORMAL
PDW BLD-RTO: 13.8 % (ref 12.5–15.4)
PLATELET # BLD AUTO: 286 K/UL (ref 140–450)
PMV BLD AUTO: 7.6 FL (ref 6–12)
POTASSIUM SERPL-SCNC: 4 MMOL/L (ref 3.7–5.3)
PROT SERPL-MCNC: 7.4 G/DL (ref 6.4–8.3)
PROT UR STRIP.AUTO-MCNC: 7 MG/DL (ref 5–8)
PROT UR STRIP.AUTO-MCNC: NEGATIVE MG/DL
RBC # BLD: 5.05 M/UL (ref 4–5.2)
RBC CLUMPS #/AREA URNS AUTO: ABNORMAL /HPF (ref 0–2)
SODIUM SERPL-SCNC: 141 MMOL/L (ref 135–144)
SPECIFIC GRAVITY UA: 1.01 (ref 1–1.03)
TROPONIN I SERPL DL<=0.01 NG/ML-MCNC: 6 NG/L (ref 0–14)
TROPONIN I SERPL DL<=0.01 NG/ML-MCNC: 7 NG/L (ref 0–14)
TURBIDITY: CLEAR
URINE HGB: NEGATIVE
UROBILINOGEN, URINE: NORMAL
WBC # BLD AUTO: 6.5 K/UL (ref 3.5–11)
WBC UA: ABNORMAL /HPF (ref 0–5)

## 2023-03-16 PROCEDURE — 81001 URINALYSIS AUTO W/SCOPE: CPT

## 2023-03-16 PROCEDURE — 70450 CT HEAD/BRAIN W/O DYE: CPT

## 2023-03-16 PROCEDURE — 80053 COMPREHEN METABOLIC PANEL: CPT

## 2023-03-16 PROCEDURE — 85027 COMPLETE CBC AUTOMATED: CPT

## 2023-03-16 PROCEDURE — 36415 COLL VENOUS BLD VENIPUNCTURE: CPT

## 2023-03-16 PROCEDURE — 84484 ASSAY OF TROPONIN QUANT: CPT

## 2023-03-16 PROCEDURE — 93005 ELECTROCARDIOGRAM TRACING: CPT | Performed by: EMERGENCY MEDICINE

## 2023-03-16 PROCEDURE — 6370000000 HC RX 637 (ALT 250 FOR IP): Performed by: EMERGENCY MEDICINE

## 2023-03-16 PROCEDURE — 99285 EMERGENCY DEPT VISIT HI MDM: CPT

## 2023-03-16 PROCEDURE — 71045 X-RAY EXAM CHEST 1 VIEW: CPT

## 2023-03-16 RX ORDER — LORAZEPAM 0.5 MG/1
0.5 TABLET ORAL ONCE
Status: COMPLETED | OUTPATIENT
Start: 2023-03-16 | End: 2023-03-16

## 2023-03-16 RX ORDER — METOPROLOL SUCCINATE 25 MG/1
37.5 TABLET, EXTENDED RELEASE ORAL DAILY
Qty: 30 TABLET | Refills: 0 | Status: SHIPPED | OUTPATIENT
Start: 2023-03-16

## 2023-03-16 RX ORDER — DICYCLOMINE HYDROCHLORIDE 10 MG/1
10 CAPSULE ORAL
COMMUNITY

## 2023-03-16 RX ORDER — MECLIZINE HCL 12.5 MG/1
12.5 TABLET ORAL 3 TIMES DAILY PRN
COMMUNITY

## 2023-03-16 RX ORDER — IBUPROFEN 200 MG
400 TABLET ORAL ONCE
Status: COMPLETED | OUTPATIENT
Start: 2023-03-16 | End: 2023-03-16

## 2023-03-16 RX ADMIN — IBUPROFEN 400 MG: 200 TABLET, FILM COATED ORAL at 15:32

## 2023-03-16 RX ADMIN — LORAZEPAM 0.5 MG: 0.5 TABLET ORAL at 13:10

## 2023-03-16 ASSESSMENT — PAIN DESCRIPTION - LOCATION: LOCATION: CHEST;HEAD

## 2023-03-16 ASSESSMENT — PAIN - FUNCTIONAL ASSESSMENT
PAIN_FUNCTIONAL_ASSESSMENT: 0-10
PAIN_FUNCTIONAL_ASSESSMENT: 0-10

## 2023-03-16 ASSESSMENT — PAIN DESCRIPTION - ORIENTATION: ORIENTATION: LEFT

## 2023-03-16 ASSESSMENT — PAIN DESCRIPTION - DESCRIPTORS: DESCRIPTORS: PINS AND NEEDLES

## 2023-03-16 ASSESSMENT — PAIN SCALES - GENERAL
PAINLEVEL_OUTOF10: 2
PAINLEVEL_OUTOF10: 4
PAINLEVEL_OUTOF10: 8

## 2023-03-16 ASSESSMENT — PAIN DESCRIPTION - PAIN TYPE: TYPE: ACUTE PAIN

## 2023-03-16 NOTE — ED NOTES
Pt ambulates to room, gait steady yet slow. C/o Chest pain, SOB, Migraine, & high blood pressure. She seen her cardiologist today and he referred her here for concern of her HBP & hx of strokes d/t stress she is experiencing @ home. Pt reports CP onset two hours ago -under left breast described as pins and needles. Pt is SOB but denies any @ home oxygen. She said she is supposed to be using nebulizer treatments but doesn't & is unsure why. Pt SPO2 initially was 90% so placed her on 2 L via NC & now sat is 96%. Pt AOX4, PWD.      Adela Jones, RN  03/16/23 1316

## 2023-03-16 NOTE — ED NOTES
Phoned Dr. Michoacano Portillo per Dr. Adilia Banda for consult but was on hold for 7 min so just perfect served Cardiology [facility] )     Karyn Quinteros RN  03/16/23 1408

## 2023-03-16 NOTE — ED PROVIDER NOTES
[address] [facility] Emergency Department  [id number] [facility] [address] [facility] [address]  Phone: [phone]  Fax: [phone]      Pt Name: Rober Sullivan  CEI:[id number]  Paresh 1955  Date of evaluation: 3/16/2023      CHIEF COMPLAINT       Chief Complaint   Patient presents with    Migraine    Chest Pain     Pins and needs on left side under breast-onset two hours ago-stress @ home    Shortness of Breath     Supposed to used nebulizer treatment at home but doesn't use oxygen       HISTORY OF PRESENT ILLNESS   42-year-old female presents to the emergency department today referred here by her cardiologist because of elevated blood pressure. She was complaining about a global headache as well as chest pain in the cardiologist office. Blood pressure was found to be elevated. She does report that she gets headaches like this from time to time. Stress and anxiety does exacerbate it. She complains of left-sided chest pain. She reports that she has had pain similar in the past when she gets upset. She admits currently that she is upset about her grandchildren. No associated nausea vomiting or diaphoresis. No mitigating precipitating exacerbating factors. There is been no other contemporaneous evaluation or management of the symptoms prior to arrival.    REVIEW OF SYSTEMS     Review of Systems   All other systems reviewed and are negative. PAST MEDICAL HISTORY    has a past medical history of Anxiety, Cancer ([address] Cerebral artery occlusion with cerebral infarction ([address] Depression, GERD (gastroesophageal reflux disease), History of CVA (cerebrovascular accident), Hyperlipidemia, Hypertension, Neoplasm of parotid gland, Osteoarthritis, Osteopenia, Peripheral vascular disease ([address] PONV (postoperative nausea and vomiting), Primary cancer of parotid gland ([address] Shortness of breath, Snores, and Urinary frequency.     SURGICAL HISTORY      has a past surgical history that includes Nose surgery; Varicose vein surgery (Bilateral); Foot surgery (Bilateral);  section; Mastectomy (Right, 2014); Bunionectomy (Left, 2015); lipoma resection (Right); Wrist fracture surgery (Right, 2016); Bunionectomy (Right, 10/10/2016); Salivary gland surgery; Breast surgery (Right); Breast reconstruction (Right, 2014); eye surgery; Colonoscopy; joint replacement (Left, 2018); pr arthrp kne condyle&platu medial&lat compartments (Left, 2018); Upper gastrointestinal endoscopy (N/A, 2020); and Colonoscopy (N/A, 2020). CURRENT MEDICATIONS       Current Discharge Medication List        CONTINUE these medications which have NOT CHANGED    Details   meclizine (ANTIVERT) 12.5 MG tablet Take 12.5 mg by mouth 3 times daily as needed      dicyclomine (BENTYL) 10 MG capsule Take 10 mg by mouth 4 times daily (before meals and nightly)      clopidogrel (PLAVIX) 75 MG tablet take 1 tablet by mouth daily  Qty: 20 tablet, Refills: 0      ALPRAZolam (XANAX) 0.5 MG tablet Take 0.5 mg by mouth nightly as needed. ascorbic acid (VITAMIN C) 500 MG tablet Take 500 mg by mouth nightly      atorvastatin (LIPITOR) 10 MG tablet take 1 tablet by mouth once daily      buprenorphine (BUPRENEX) 5 MCG/HR PTWK apply 1 patch to affected area every week for 1 month      celecoxib (CELEBREX) 200 MG capsule Take 200 mg by mouth daily      !! cycloSPORINE (RESTASIS) 0.05 % ophthalmic emulsion Apply 1 drop to eye in the morning and 1 drop in the evening.       diclofenac sodium (VOLTAREN) 1 % GEL apply 4 grams to affected area four times a day for 1 month if needed      DULoxetine (CYMBALTA) 60 MG extended release capsule Take 60 mg by mouth daily      fluticasone (FLONASE) 50 MCG/ACT nasal spray instill 2 sprays into each nostril once daily      irbesartan (AVAPRO) 300 MG tablet take 1 tablet by mouth once daily      meloxicam (MOBIC) 15 MG tablet Take 15 mg by mouth daily      aspirin 81 MG EC tablet Take 1 tablet by mouth daily  Qty: 30 tablet, Refills: 3      buprenorphine (BUPRENEX) 15 MCG/HR PTWK Place 1 patch onto the skin once a week. .      gabapentin (NEURONTIN) 100 MG capsule Take 100 mg by mouth nightly. .      !! RESTASIS 0.05 % ophthalmic emulsion INSTILL ONE DROP INTO BOTH EYES TWICE DAILY  Refills: 4       !! - Potential duplicate medications found. Please discuss with provider. ALLERGIES     is allergic to latex, fluoxetine, oxycodone-acetaminophen, oxycodone-acetaminophen, and tape [adhesive tape]. FAMILY HISTORY     She indicated that her mother is . She indicated that her father is . She indicated that the status of her brother is unknown.     family history includes Cancer in her father; Diabetes in her brother. SOCIAL HISTORY      reports that she quit smoking about 36 years ago. Her smoking use included cigarettes. She started smoking about 39 years ago. She has a 0.50 pack-year smoking history. She has never used smokeless tobacco. She reports that she does not drink alcohol and does not use drugs. PHYSICAL EXAM     INITIAL VITALS:  height is 5' 1\" (1.549 m) and weight is 77.6 kg (171 lb). Her oral temperature is 98.6 °F (37 °C). Her blood pressure is 179/101 (abnormal) and her pulse is 85. Her respiration is 13 and oxygen saturation is 94%. Physical Exam  Vitals reviewed. Constitutional:       General: She is not in acute distress. Appearance: She is well-developed. Comments: Patient is tearful crying on exam table. HENT:      Head: Normocephalic and atraumatic. Eyes:      Conjunctiva/sclera: Conjunctivae normal.      Pupils: Pupils are equal, round, and reactive to light. Neck:      Trachea: No tracheal deviation. Cardiovascular:      Rate and Rhythm: Normal rate and regular rhythm. Pulmonary:      Effort: No respiratory distress. Breath sounds: Normal breath sounds.    Abdominal:      General: Bowel sounds are normal. There is no distension. Palpations: Abdomen is soft. Tenderness: There is no abdominal tenderness. Musculoskeletal:         General: No tenderness. Normal range of motion. Cervical back: Normal range of motion and neck supple. Skin:     General: Skin is warm and dry. Neurological:      Mental Status: She is alert and oriented to person, place, and time. Psychiatric:         Behavior: Behavior normal.         Thought Content: Thought content normal.         Judgment: Judgment normal.         DIFFERENTIAL DIAGNOSIS/ MDM:   Patient is tearful here in certainly there does appear to be a component of anxiety for this patient. Nonetheless her blood pressure is elevated. I will give her some Ativan to see if that helps calm her down and see what her blood pressure does as a result. I will look into endorgan damage. DIAGNOSTIC RESULTS     EKG:  EKG is interpreted by myself showing normal sinus rhythm with left axis deviation. I do not appreciate acute segment changes. Rate, axis and intervals are otherwise normal.    Repeat EKG done at 2 R intervals interpreted myself showing a sinus rhythm with left axis deviation. She does have prolonged QT interval with a corrected QT interval of 511. I do not appreciate acute segment changes. Rate and intervals are otherwise normal.    RADIOLOGY:   CT HEAD WO CONTRAST    Result Date: 3/16/2023  EXAMINATION: CT OF THE HEAD WITHOUT CONTRAST  3/16/2023 1:28 pm TECHNIQUE: CT of the head was performed without the administration of intravenous contrast. Automated exposure control, iterative reconstruction, and/or weight based adjustment of the mA/kV was utilized to reduce the radiation dose to as low as reasonably achievable.  COMPARISON: MRI 10/2/2022, CT 10/2/2022 HISTORY: ORDERING SYSTEM PROVIDED HISTORY: headache TECHNOLOGIST PROVIDED HISTORY: Headache Decision Support Exception - unselect if not a suspected or confirmed emergency medical condition->Emergency Medical Condition (MA) Reason for Exam: headache FINDINGS: BRAIN/VENTRICLES: There is no acute intracranial hemorrhage, mass effect or midline shift. No abnormal extra-axial fluid collection. The gray-white differentiation is maintained without evidence of an acute infarct. There is no evidence of hydrocephalus. There are stable areas of encephalomalacia involving the right frontal lobe, consistent with multiple remote infarcts. Moderate periventricular white matter hypodensities consistent with chronic microvascular ischemia. No new foci of abnormal attenuation identified within the brain. ORBITS: The visualized portion of the orbits demonstrate no acute abnormality. SINUSES: Right mastoid effusion. Paranasal sinuses are clear. SOFT TISSUES/SKULL:  No acute abnormality of the visualized skull or soft tissues. No acute intracranial abnormality. Stable chronic right frontal lobe infarcts and chronic microvascular ischemic changes. Nonspecific right mastoid effusion. XR CHEST PORTABLE    Result Date: 3/16/2023  EXAMINATION: ONE XRAY VIEW OF THE CHEST 3/16/2023 1:37 pm COMPARISON: September 20, 2016 HISTORY: ORDERING SYSTEM PROVIDED HISTORY: Chest Pain TECHNOLOGIST PROVIDED HISTORY: Chest Pain Reason for Exam: Port upright AP CXR - chest pain, migraine, SOB FINDINGS: Stable cardiomediastinal silhouette. No pulmonary venous congestion or edema. No convincing lung consolidation or infiltrate. No pleural effusion or pneumothorax. There has been prior right axillary lymph node dissection. Osseous structures grossly intact.      No acute cardiopulmonary process          LABS:  Results for orders placed or performed during the hospital encounter of 03/16/23   CBC   Result Value Ref Range    WBC 6.5 3.5 - 11.0 k/uL    RBC 5.05 4.0 - 5.2 m/uL    Hemoglobin 14.8 12.0 - 16.0 g/dL    Hematocrit 43.9 36 - 46 %    MCV 86.8 80 - 100 fL    MCH 29.3 26 - 34 pg    MCHC 33.7 31 - 37 g/dL    RDW 13.8 12.5 - 15.4 %    Platelets 286 140 - 450 k/uL    MPV 7.6 6.0 - 12.0 fL   Comprehensive Metabolic Panel   Result Value Ref Range    Glucose 102 (H) 70 - 99 mg/dL    BUN 14 8 - 23 mg/dL    Creatinine 0.74 0.50 - 0.90 mg/dL    Est, Glom Filt Rate >60 >60 mL/min/1.73m2    Calcium 9.2 8.6 - 10.4 mg/dL    Sodium 141 135 - 144 mmol/L    Potassium 4.0 3.7 - 5.3 mmol/L    Chloride 105 98 - 107 mmol/L    CO2 24 20 - 31 mmol/L    Anion Gap 12 9 - 17 mmol/L    Alkaline Phosphatase 87 35 - 104 U/L    ALT 61 (H) 5 - 33 U/L    AST 48 (H) <32 U/L    Total Bilirubin 0.6 0.3 - 1.2 mg/dL    Total Protein 7.4 6.4 - 8.3 g/dL    Albumin 4.0 3.5 - 5.2 g/dL    Albumin/Globulin Ratio 1.2 1.0 - 2.5   Troponin   Result Value Ref Range    Troponin, High Sensitivity 6 0 - 14 ng/L   Troponin   Result Value Ref Range    Troponin, High Sensitivity 7 0 - 14 ng/L   Urinalysis with Reflex to Culture    Specimen: Urine, clean catch   Result Value Ref Range    Color, UA Yellow Yellow    Turbidity UA Clear Clear    Glucose, Ur NEGATIVE NEGATIVE    Bilirubin Urine NEGATIVE NEGATIVE    Ketones, Urine NEGATIVE NEGATIVE    Specific Gravity, UA 1.010 1.005 - 1.030    Urine Hgb NEGATIVE NEGATIVE    pH, UA 7.0 5.0 - 8.0    Protein, UA NEGATIVE NEGATIVE    Urobilinogen, Urine Normal Normal    Nitrite, Urine NEGATIVE NEGATIVE    Leukocyte Esterase, Urine SMALL (A) NEGATIVE   Microscopic Urinalysis   Result Value Ref Range    WBC, UA 5 TO 10 0 - 5 /HPF    RBC, UA 0 TO 2 0 - 2 /HPF    Epithelial Cells UA 10 TO 20 0 - 5 /HPF    Bacteria, UA FEW (A) None    Other Observations UA (A) NOT REQ. Utilizing a urinalysis as the only screening method to exclude a potential uropathogen can be unreliable in many patient populations. Rapid screening tests are less sensitive than culture and if UTI is a clinical possibility, culture should be considered despite a negative urinalysis.          EMERGENCY DEPARTMENT COURSE:     Thepatient was given the following medications:  Orders Placed This Encounter   Medications    LORazepam (ATIVAN) tablet 0.5 mg    ibuprofen (ADVIL;MOTRIN) tablet 400 mg    metoprolol succinate (TOPROL XL) 25 MG extended release tablet     Sig: Take 1.5 tablets by mouth daily     Dispense:  30 tablet     Refill:  0        Vitals:    Vitals:    03/16/23 1243 03/16/23 1402 03/16/23 1451 03/16/23 1545   BP:  (!) 156/98 (!) 163/98 (!) 179/101   Pulse:  78 80 85   Resp:  18 18 13   Temp:       TempSrc:       SpO2: 96% 96% 93% 94%   Weight:       Height:         -------------------------  BP: (!) 179/101, Temp: 98.6 °F (37 °C), Heart Rate: 85, Resp: 13      Re-evaluation Notes  2:56 PM EDT  Patient states that she feels better overall but still has a headache. I have ordered ibuprofen for pain. Blood pressure still elevated but is improving. 3:47 PM EDT  Headache is improved. Blood pressure still has a systolic around 085 and diastolic around 364. Have a page out to cardiology to get direction in terms of how they would like me to manage this patient's hypertension. 4:07 PM EDT  I have discussed patient with Dr. Michael Wilson. I reviewed medication. He recommends an additional half dose of the metoprolol daily on top of her baseline 25 mg.  I have reviewed this with the patient as well as with her son and they are in agreement. Differential diagnosis considered: Acute coronary syndrome.   Intracranial hemorrhage, hypertensive urgency, hypertensive emergency    Chronic Conditions affecting care (DM,HTN,CA, etc): See above    Social Determinants of Health affecting care (unable to care for self, lives alone, unemployed, homeless,etc): Patient lives alone    History source(s) (patient,spouse,parent,family,friend,EMS,etc): Patient    Review of external sources (ECF,Hospital records,EMS report, radiology reports, etc): None    Tests considered but not ordered: Not applicable    Independent interpretation of tests (eg.  X-ray, CAT scan, Doppler studies, EKG): EKG    Discussion of x-ray results with radiology: CT head, chest x-ray    Consults: Patient's cardiologist, Dr. Xiang Whitlock    Consideration for admission/observation (even if discharged): Admission was considered however there is no evidence of endorgan damage and patient is improved. Outpatient management is safe and appropriate. Prescription considerations: Increase metoprolol to 1-1/2 tablets daily    Sepsis considered: Not applicable    Critical Care note written: None      CONSULTS:  None    CRITICAL CARE:   None    PROCEDURES:  None    FINAL IMPRESSION      1. Hypertensive urgency    2. Situational anxiety    3. Chest pain, unspecified type    4. Intractable headache, unspecified chronicity pattern, unspecified headache type          DISPOSITION/PLAN   DISPOSITION Decision To Discharge 03/16/2023 03:47:28 PM      Condition on Disposition  Improved    PATIENT REFERRED TO:  No follow-up provider specified.     DISCHARGE MEDICATIONS:  [unfilled]    (Please note that portions of this note were completed with a voice recognitionprogram.  Efforts were made to edit the dictations but occasionally words are mis-transcribed.)    Uri Baez MD MD, F.A.C.E.P, F.A.A.E.M  Emergency Physician Attending          Uri Baez MD  03/16/23 [id number]

## 2023-03-17 LAB
EKG ATRIAL RATE: 78 BPM
EKG ATRIAL RATE: 78 BPM
EKG P AXIS: 38 DEGREES
EKG P AXIS: 39 DEGREES
EKG P-R INTERVAL: 156 MS
EKG P-R INTERVAL: 156 MS
EKG Q-T INTERVAL: 422 MS
EKG Q-T INTERVAL: 438 MS
EKG QRS DURATION: 92 MS
EKG QRS DURATION: 94 MS
EKG QTC CALCULATION (BAZETT): 481 MS
EKG QTC CALCULATION (BAZETT): 499 MS
EKG R AXIS: -41 DEGREES
EKG R AXIS: -41 DEGREES
EKG T AXIS: 28 DEGREES
EKG T AXIS: 39 DEGREES
EKG VENTRICULAR RATE: 78 BPM
EKG VENTRICULAR RATE: 78 BPM

## 2023-03-17 RX ORDER — CLOPIDOGREL BISULFATE 75 MG/1
TABLET ORAL
Qty: 20 TABLET | Refills: 0 | Status: SHIPPED | OUTPATIENT
Start: 2023-03-17

## 2023-03-31 RX ORDER — CLOPIDOGREL BISULFATE 75 MG/1
TABLET ORAL
Qty: 20 TABLET | Refills: 0 | OUTPATIENT
Start: 2023-03-31

## 2023-05-25 PROBLEM — Q21.10 ATRIAL SEPTAL DEFECT: Status: ACTIVE | Noted: 2023-05-25

## 2023-05-25 PROBLEM — I63.9 CEREBROVASCULAR ACCIDENT (CVA) (HCC): Status: ACTIVE | Noted: 2023-05-25

## 2023-05-25 PROBLEM — R94.31 ABNORMAL ECG: Status: ACTIVE | Noted: 2023-05-25

## 2023-06-21 ENCOUNTER — PRE-PROCEDURE TELEPHONE (OUTPATIENT)
Dept: CARDIAC CATH/INVASIVE PROCEDURES | Age: 68
End: 2023-06-21

## 2023-06-21 NOTE — PROGRESS NOTES
Verified with patient time of arrival, allergies, NPO status, where to register. Patient unable to provide a list of medications at this time. Asked patient to please bring a list of medications with her to the procedure. Pt denies any further questions at this time.

## 2023-06-22 ENCOUNTER — HOSPITAL ENCOUNTER (OUTPATIENT)
Dept: CARDIAC CATH/INVASIVE PROCEDURES | Age: 68
Discharge: HOME | End: 2023-06-24
Payer: MEDICARE

## 2023-06-22 ENCOUNTER — HOSPITAL ENCOUNTER (OUTPATIENT)
Dept: NON INVASIVE DIAGNOSTICS | Age: 68
Discharge: HOME | End: 2023-06-24
Payer: MEDICARE

## 2023-06-22 VITALS
HEART RATE: 86 BPM | SYSTOLIC BLOOD PRESSURE: 151 MMHG | TEMPERATURE: 98 F | DIASTOLIC BLOOD PRESSURE: 96 MMHG | OXYGEN SATURATION: 94 % | RESPIRATION RATE: 18 BRPM

## 2023-06-22 DIAGNOSIS — G45.9 TIA (TRANSIENT ISCHEMIC ATTACK): ICD-10-CM

## 2023-06-22 LAB
EGFR, POC: >60 ML/MIN/1.73M2
GLUCOSE BLD-MCNC: 100 MG/DL (ref 74–100)
HCT VFR BLD AUTO: 52 % (ref 36–46)
LV EF: 58 %
LVEF MODALITY: NORMAL
PLATELET # BLD AUTO: 286 K/UL (ref 140–450)
POC BUN: 9 MG/DL (ref 8–26)
POC CHLORIDE: 111 MMOL/L (ref 98–107)
POC CREATININE: 0.71 MG/DL (ref 0.51–1.19)
POC HEMOGLOBIN: 17.6 G/DL (ref 12–16)
POC IONIZED CALCIUM: 1.22 MMOL/L (ref 1.15–1.33)
POC SODIUM: 145 MMOL/L (ref 138–146)
POTASSIUM BLD-SCNC: 4.4 MMOL/L (ref 3.5–4.5)

## 2023-06-22 PROCEDURE — 99152 MOD SED SAME PHYS/QHP 5/>YRS: CPT

## 2023-06-22 PROCEDURE — 93312 ECHO TRANSESOPHAGEAL: CPT

## 2023-06-22 PROCEDURE — 36415 COLL VENOUS BLD VENIPUNCTURE: CPT

## 2023-06-22 PROCEDURE — 93005 ELECTROCARDIOGRAM TRACING: CPT | Performed by: INTERNAL MEDICINE

## 2023-06-22 PROCEDURE — 6370000000 HC RX 637 (ALT 250 FOR IP)

## 2023-06-22 PROCEDURE — 82947 ASSAY GLUCOSE BLOOD QUANT: CPT

## 2023-06-22 PROCEDURE — 93312 ECHO TRANSESOPHAGEAL: CPT | Performed by: INTERNAL MEDICINE

## 2023-06-22 PROCEDURE — 82565 ASSAY OF CREATININE: CPT

## 2023-06-22 PROCEDURE — 6360000002 HC RX W HCPCS

## 2023-06-22 PROCEDURE — 85049 AUTOMATED PLATELET COUNT: CPT

## 2023-06-22 PROCEDURE — 82330 ASSAY OF CALCIUM: CPT

## 2023-06-22 PROCEDURE — 84520 ASSAY OF UREA NITROGEN: CPT

## 2023-06-22 PROCEDURE — 85014 HEMATOCRIT: CPT

## 2023-06-22 PROCEDURE — 2580000003 HC RX 258: Performed by: INTERNAL MEDICINE

## 2023-06-22 PROCEDURE — 84295 ASSAY OF SERUM SODIUM: CPT

## 2023-06-22 PROCEDURE — 7100000010 HC PHASE II RECOVERY - FIRST 15 MIN

## 2023-06-22 PROCEDURE — 82435 ASSAY OF BLOOD CHLORIDE: CPT

## 2023-06-22 PROCEDURE — 84132 ASSAY OF SERUM POTASSIUM: CPT

## 2023-06-22 PROCEDURE — 7100000011 HC PHASE II RECOVERY - ADDTL 15 MIN

## 2023-06-22 RX ORDER — SODIUM CHLORIDE 9 MG/ML
INJECTION, SOLUTION INTRAVENOUS CONTINUOUS
Status: DISCONTINUED | OUTPATIENT
Start: 2023-06-22 | End: 2023-06-25 | Stop reason: HOSPADM

## 2023-06-22 RX ADMIN — SODIUM CHLORIDE: 9 INJECTION, SOLUTION INTRAVENOUS at 12:44

## 2023-06-22 NOTE — PROGRESS NOTES
Patient admitted, consent signed and questions answered. Patient ready for procedure. Call light to reach with side rails up 2 of 2. Son Jose Humphrey at bedside with patient. History and physical needs updated .

## 2023-06-22 NOTE — PROGRESS NOTES
Received post procedure to [facility] to room. Assessment obtained. Restrictions reviewed with patient. Post procedure pathway initiated. Patient without complaints.

## 2023-06-22 NOTE — PROGRESS NOTES
All discharge instructions reviewed, questions answered, paper signed and given copy. Patient discharged per wheelchair with son Octaviano Mendoza and belongings.

## 2023-06-22 NOTE — PROCEDURES
SERENA done without complications. Global left ventricular systolic function is normal. Estimated EF is 55-60%. Mild to moderate mitral regurgitation. No vegetations or clots seen. Positive bubble study for PFO with right to left shunt. Mild aortic atherosclerotic disease.

## 2023-06-23 LAB
EKG ATRIAL RATE: 79 BPM
EKG P AXIS: 42 DEGREES
EKG P-R INTERVAL: 154 MS
EKG Q-T INTERVAL: 414 MS
EKG QRS DURATION: 90 MS
EKG QTC CALCULATION (BAZETT): 474 MS
EKG R AXIS: -43 DEGREES
EKG T AXIS: 35 DEGREES
EKG VENTRICULAR RATE: 79 BPM

## 2023-07-31 ENCOUNTER — APPOINTMENT (OUTPATIENT)
Dept: CT IMAGING | Age: 68
DRG: 153 | End: 2023-07-31
Payer: MEDICARE

## 2023-07-31 ENCOUNTER — HOSPITAL ENCOUNTER (INPATIENT)
Age: 68
LOS: 1 days | Discharge: HOME | DRG: 153 | End: 2023-08-01
Attending: EMERGENCY MEDICINE | Admitting: FAMILY MEDICINE
Payer: MEDICARE

## 2023-07-31 DIAGNOSIS — H70.91 MASTOIDITIS OF RIGHT SIDE: Primary | ICD-10-CM

## 2023-07-31 PROBLEM — Z87.891 FORMER CIGARETTE SMOKER: Status: ACTIVE | Noted: 2023-07-31

## 2023-07-31 PROBLEM — H70.90 MASTOIDITIS: Status: ACTIVE | Noted: 2023-07-31

## 2023-07-31 LAB
ALBUMIN SERPL-MCNC: 3.9 G/DL (ref 3.5–5.2)
ALBUMIN/GLOB SERPL: 1.2 {RATIO} (ref 1–2.5)
ALP SERPL-CCNC: 81 U/L (ref 35–104)
ALT SERPL-CCNC: 29 U/L (ref 5–33)
ANION GAP SERPL CALCULATED.3IONS-SCNC: 12 MMOL/L (ref 9–17)
AST SERPL-CCNC: 27 U/L
BASOPHILS # BLD: 0 K/UL (ref 0–0.2)
BASOPHILS NFR BLD: 0 % (ref 0–2)
BILIRUB SERPL-MCNC: 0.4 MG/DL (ref 0.3–1.2)
BUN SERPL-MCNC: 14 MG/DL (ref 8–23)
CALCIUM SERPL-MCNC: 9.2 MG/DL (ref 8.6–10.4)
CHLORIDE SERPL-SCNC: 107 MMOL/L (ref 98–107)
CO2 SERPL-SCNC: 23 MMOL/L (ref 20–31)
CREAT SERPL-MCNC: 0.6 MG/DL (ref 0.5–0.9)
EOSINOPHIL # BLD: 0.1 K/UL (ref 0–0.4)
EOSINOPHILS RELATIVE PERCENT: 2 % (ref 1–4)
ERYTHROCYTE [DISTWIDTH] IN BLOOD BY AUTOMATED COUNT: 14 % (ref 12.5–15.4)
GFR SERPL CREATININE-BSD FRML MDRD: >60 ML/MIN/1.73M2
GLUCOSE SERPL-MCNC: 122 MG/DL (ref 70–99)
HCT VFR BLD AUTO: 43.8 % (ref 36–46)
HGB BLD-MCNC: 14.8 G/DL (ref 12–16)
LYMPHOCYTES NFR BLD: 1.1 K/UL (ref 1–4.8)
LYMPHOCYTES RELATIVE PERCENT: 21 % (ref 24–44)
MAGNESIUM SERPL-MCNC: 2.3 MG/DL (ref 1.6–2.6)
MCH RBC QN AUTO: 29.4 PG (ref 26–34)
MCHC RBC AUTO-ENTMCNC: 33.8 G/DL (ref 31–37)
MCV RBC AUTO: 87 FL (ref 80–100)
MONOCYTES NFR BLD: 0.5 K/UL (ref 0.1–1.2)
MONOCYTES NFR BLD: 10 % (ref 2–11)
NEUTROPHILS NFR BLD: 67 % (ref 36–66)
NEUTS SEG NFR BLD: 3.6 K/UL (ref 1.8–7.7)
PLATELET # BLD AUTO: 265 K/UL (ref 140–450)
PMV BLD AUTO: 7.5 FL (ref 6–12)
POTASSIUM SERPL-SCNC: 4.3 MMOL/L (ref 3.7–5.3)
PROT SERPL-MCNC: 7.2 G/DL (ref 6.4–8.3)
RBC # BLD AUTO: 5.03 M/UL (ref 4–5.2)
SODIUM SERPL-SCNC: 142 MMOL/L (ref 135–144)
WBC OTHER # BLD: 5.4 K/UL (ref 3.5–11)

## 2023-07-31 PROCEDURE — 2580000003 HC RX 258

## 2023-07-31 PROCEDURE — 36415 COLL VENOUS BLD VENIPUNCTURE: CPT

## 2023-07-31 PROCEDURE — 6370000000 HC RX 637 (ALT 250 FOR IP)

## 2023-07-31 PROCEDURE — 80053 COMPREHEN METABOLIC PANEL: CPT

## 2023-07-31 PROCEDURE — 87040 BLOOD CULTURE FOR BACTERIA: CPT

## 2023-07-31 PROCEDURE — 85025 COMPLETE CBC W/AUTO DIFF WBC: CPT

## 2023-07-31 PROCEDURE — 6360000002 HC RX W HCPCS

## 2023-07-31 PROCEDURE — 1200000000 HC SEMI PRIVATE

## 2023-07-31 PROCEDURE — 6360000002 HC RX W HCPCS: Performed by: STUDENT IN AN ORGANIZED HEALTH CARE EDUCATION/TRAINING PROGRAM

## 2023-07-31 PROCEDURE — 99285 EMERGENCY DEPT VISIT HI MDM: CPT

## 2023-07-31 PROCEDURE — 2580000003 HC RX 258: Performed by: EMERGENCY MEDICINE

## 2023-07-31 PROCEDURE — 83735 ASSAY OF MAGNESIUM: CPT

## 2023-07-31 PROCEDURE — 6360000002 HC RX W HCPCS: Performed by: EMERGENCY MEDICINE

## 2023-07-31 PROCEDURE — 2580000003 HC RX 258: Performed by: STUDENT IN AN ORGANIZED HEALTH CARE EDUCATION/TRAINING PROGRAM

## 2023-07-31 PROCEDURE — 70450 CT HEAD/BRAIN W/O DYE: CPT

## 2023-07-31 PROCEDURE — 96374 THER/PROPH/DIAG INJ IV PUSH: CPT

## 2023-07-31 RX ORDER — MAGNESIUM SULFATE IN WATER 40 MG/ML
2000 INJECTION, SOLUTION INTRAVENOUS PRN
Status: DISCONTINUED | OUTPATIENT
Start: 2023-07-31 | End: 2023-08-01 | Stop reason: HOSPADM

## 2023-07-31 RX ORDER — ONDANSETRON 2 MG/ML
4 INJECTION INTRAMUSCULAR; INTRAVENOUS EVERY 6 HOURS PRN
Status: DISCONTINUED | OUTPATIENT
Start: 2023-07-31 | End: 2023-08-01 | Stop reason: HOSPADM

## 2023-07-31 RX ORDER — CELECOXIB 100 MG/1
200 CAPSULE ORAL DAILY
Status: DISCONTINUED | OUTPATIENT
Start: 2023-08-01 | End: 2023-08-01 | Stop reason: HOSPADM

## 2023-07-31 RX ORDER — POLYETHYLENE GLYCOL 3350 17 G/17G
17 POWDER, FOR SOLUTION ORAL DAILY PRN
Status: DISCONTINUED | OUTPATIENT
Start: 2023-07-31 | End: 2023-08-01 | Stop reason: HOSPADM

## 2023-07-31 RX ORDER — DULOXETIN HYDROCHLORIDE 30 MG/1
60 CAPSULE, DELAYED RELEASE ORAL DAILY
Status: DISCONTINUED | OUTPATIENT
Start: 2023-08-01 | End: 2023-08-01 | Stop reason: HOSPADM

## 2023-07-31 RX ORDER — SODIUM CHLORIDE 9 MG/ML
INJECTION, SOLUTION INTRAVENOUS PRN
Status: DISCONTINUED | OUTPATIENT
Start: 2023-07-31 | End: 2023-08-01 | Stop reason: HOSPADM

## 2023-07-31 RX ORDER — ASPIRIN 81 MG/1
81 TABLET ORAL DAILY
Status: DISCONTINUED | OUTPATIENT
Start: 2023-08-01 | End: 2023-08-01 | Stop reason: HOSPADM

## 2023-07-31 RX ORDER — SODIUM CHLORIDE 0.9 % (FLUSH) 0.9 %
5-40 SYRINGE (ML) INJECTION EVERY 12 HOURS SCHEDULED
Status: DISCONTINUED | OUTPATIENT
Start: 2023-07-31 | End: 2023-08-01 | Stop reason: HOSPADM

## 2023-07-31 RX ORDER — ONDANSETRON 4 MG/1
4 TABLET, ORALLY DISINTEGRATING ORAL EVERY 8 HOURS PRN
Status: DISCONTINUED | OUTPATIENT
Start: 2023-07-31 | End: 2023-08-01 | Stop reason: HOSPADM

## 2023-07-31 RX ORDER — GABAPENTIN 100 MG/1
100 CAPSULE ORAL NIGHTLY
Status: DISCONTINUED | OUTPATIENT
Start: 2023-07-31 | End: 2023-08-01 | Stop reason: HOSPADM

## 2023-07-31 RX ORDER — KETOROLAC TROMETHAMINE 15 MG/ML
15 INJECTION, SOLUTION INTRAMUSCULAR; INTRAVENOUS ONCE
Status: COMPLETED | OUTPATIENT
Start: 2023-07-31 | End: 2023-07-31

## 2023-07-31 RX ORDER — SODIUM CHLORIDE 0.9 % (FLUSH) 0.9 %
5-40 SYRINGE (ML) INJECTION PRN
Status: DISCONTINUED | OUTPATIENT
Start: 2023-07-31 | End: 2023-08-01 | Stop reason: HOSPADM

## 2023-07-31 RX ORDER — POTASSIUM CHLORIDE 7.45 MG/ML
10 INJECTION INTRAVENOUS PRN
Status: DISCONTINUED | OUTPATIENT
Start: 2023-07-31 | End: 2023-08-01 | Stop reason: HOSPADM

## 2023-07-31 RX ORDER — 0.9 % SODIUM CHLORIDE 0.9 %
1000 INTRAVENOUS SOLUTION INTRAVENOUS ONCE
Status: COMPLETED | OUTPATIENT
Start: 2023-07-31 | End: 2023-07-31

## 2023-07-31 RX ORDER — ATORVASTATIN CALCIUM 10 MG/1
10 TABLET, FILM COATED ORAL DAILY
Status: DISCONTINUED | OUTPATIENT
Start: 2023-08-01 | End: 2023-08-01 | Stop reason: HOSPADM

## 2023-07-31 RX ORDER — POTASSIUM CHLORIDE 20 MEQ/1
40 TABLET, EXTENDED RELEASE ORAL PRN
Status: DISCONTINUED | OUTPATIENT
Start: 2023-07-31 | End: 2023-08-01 | Stop reason: HOSPADM

## 2023-07-31 RX ORDER — SODIUM CHLORIDE 9 MG/ML
INJECTION, SOLUTION INTRAVENOUS CONTINUOUS
Status: DISCONTINUED | OUTPATIENT
Start: 2023-07-31 | End: 2023-08-01 | Stop reason: HOSPADM

## 2023-07-31 RX ORDER — ENOXAPARIN SODIUM 100 MG/ML
40 INJECTION SUBCUTANEOUS DAILY
Status: DISCONTINUED | OUTPATIENT
Start: 2023-07-31 | End: 2023-08-01 | Stop reason: HOSPADM

## 2023-07-31 RX ADMIN — PIPERACILLIN AND TAZOBACTAM 4500 MG: 4; .5 INJECTION, POWDER, LYOPHILIZED, FOR SOLUTION INTRAVENOUS at 16:34

## 2023-07-31 RX ADMIN — CEFTRIAXONE SODIUM 1000 MG: 1 INJECTION, POWDER, FOR SOLUTION INTRAMUSCULAR; INTRAVENOUS at 09:18

## 2023-07-31 RX ADMIN — GABAPENTIN 100 MG: 100 CAPSULE ORAL at 21:33

## 2023-07-31 RX ADMIN — SODIUM CHLORIDE 1000 ML: 9 INJECTION, SOLUTION INTRAVENOUS at 09:16

## 2023-07-31 RX ADMIN — SODIUM CHLORIDE: 9 INJECTION, SOLUTION INTRAVENOUS at 14:40

## 2023-07-31 RX ADMIN — KETOROLAC TROMETHAMINE 15 MG: 15 INJECTION, SOLUTION INTRAMUSCULAR; INTRAVENOUS at 09:14

## 2023-07-31 RX ADMIN — ENOXAPARIN SODIUM 40 MG: 100 INJECTION SUBCUTANEOUS at 14:37

## 2023-07-31 ASSESSMENT — PAIN - FUNCTIONAL ASSESSMENT: PAIN_FUNCTIONAL_ASSESSMENT: 0-10

## 2023-07-31 ASSESSMENT — ENCOUNTER SYMPTOMS
SORE THROAT: 0
VOMITING: 0
COUGH: 0
CHEST TIGHTNESS: 0
ABDOMINAL PAIN: 0
NAUSEA: 1
FACIAL SWELLING: 0
EYE DISCHARGE: 0
BACK PAIN: 0
CONSTIPATION: 0
SHORTNESS OF BREATH: 0
DIARRHEA: 0
RHINORRHEA: 0
NAUSEA: 0
EYE PAIN: 0
PHOTOPHOBIA: 0

## 2023-07-31 ASSESSMENT — PAIN DESCRIPTION - LOCATION
LOCATION: HEAD
LOCATION: HEAD

## 2023-07-31 ASSESSMENT — PAIN DESCRIPTION - ORIENTATION: ORIENTATION: INNER

## 2023-07-31 ASSESSMENT — PAIN SCALES - GENERAL
PAINLEVEL_OUTOF10: 1
PAINLEVEL_OUTOF10: 8

## 2023-07-31 NOTE — PLAN OF CARE
Problem: Discharge Planning  Goal: Discharge to home or other facility with appropriate resources  Outcome: Progressing  Flowsheets  Taken 7/31/2023 1151  Discharge to home or other facility with appropriate resources:   Identify barriers to discharge with patient and caregiver   Arrange for needed discharge resources and transportation as appropriate   Identify discharge learning needs (meds, wound care, etc)  Taken 7/31/2023 1130  Discharge to home or other facility with appropriate resources:   Identify barriers to discharge with patient and caregiver   Arrange for needed discharge resources and transportation as appropriate   Identify discharge learning needs (meds, wound care, etc)     Problem: Pain  Goal: Verbalizes/displays adequate comfort level or baseline comfort level  Outcome: Progressing  Flowsheets (Taken 7/31/2023 1130)  Verbalizes/displays adequate comfort level or baseline comfort level:   Encourage patient to monitor pain and request assistance   Assess pain using appropriate pain scale     Problem: Safety - Adult  Goal: Free from fall injury  Outcome: Progressing

## 2023-07-31 NOTE — H&P
[address] [place] [address] Medicine Residency  Inpatient Service     HISTORY AND PHYSICAL EXAMINATION            Date:   7/31/2023  Patient name:  Jason Garcia  Date of admission:  7/31/2023  7:38 AM  MRN:   [id number]  Account:  [de-identified]  YOB: 1955  PCP:    Leo Garcia MD  Room:   313/313-01  Code Status:    Full Code      History Obtained From:     patient    History of Present Illness:     Jason Garcia is a 74 y.o.  /  female, with a history of cerebral infarction 3 years ago without residual effects, HTN, HLD, neoplasm of the parotid gland removed in 2019, breast cancer in 2006 and 2009 treated with radiation, chemo, and mastectomy, OA and osteopenia, anxiety, depression, peripheral vascular disease and GERD, who presents with headache and minor lightheadedness since Thursday and   is admitted to the hospital for the management of Mastoiditis. She says that she has always been using Q-tips to clean her ears frequently and in June she said her R neck/mastoid area was pulsating. She said she saw a her doctor who did scans and did not tell her a result. In July she noticed that she had a cut in her ear, possibly from excess use of Q-tips, which her doctor noticed and he put her on antibiotics. The patient, though nice, AAOx3, and cognitively aware and responsive, is not the best historian, has to be directed to focus on an answer and does not remember all the details of her medications. We called her pharmacy to verify which antibiotics she took. She was given Cefadroxil 500 mg BID for 10 days on 7/10 and Cefdinir 300mg  BID for 7 days on 7/20 for treatment reportedly of external and middle ear infection. In between switching her antibiotics she says she had 1 day of watery milky/pus filled discharge. After the second round of antibiotics she was having minor discharge that she would see on a q-tip.  She says she completed all the antibiotics

## 2023-08-01 VITALS
HEART RATE: 79 BPM | TEMPERATURE: 98.4 F | RESPIRATION RATE: 16 BRPM | SYSTOLIC BLOOD PRESSURE: 154 MMHG | HEIGHT: 61 IN | OXYGEN SATURATION: 96 % | BODY MASS INDEX: 33.61 KG/M2 | DIASTOLIC BLOOD PRESSURE: 92 MMHG | WEIGHT: 178 LBS

## 2023-08-01 LAB
ALBUMIN SERPL-MCNC: 3.8 G/DL (ref 3.5–5.2)
ALBUMIN/GLOB SERPL: 1.2 {RATIO} (ref 1–2.5)
ALP SERPL-CCNC: 75 U/L (ref 35–104)
ALT SERPL-CCNC: 28 U/L (ref 5–33)
ANION GAP SERPL CALCULATED.3IONS-SCNC: 10 MMOL/L (ref 9–17)
AST SERPL-CCNC: 26 U/L
BASOPHILS # BLD: 0 K/UL (ref 0–0.2)
BASOPHILS NFR BLD: 1 % (ref 0–2)
BILIRUB SERPL-MCNC: 0.7 MG/DL (ref 0.3–1.2)
BUN SERPL-MCNC: 12 MG/DL (ref 8–23)
CALCIUM SERPL-MCNC: 8.7 MG/DL (ref 8.6–10.4)
CHLORIDE SERPL-SCNC: 111 MMOL/L (ref 98–107)
CO2 SERPL-SCNC: 23 MMOL/L (ref 20–31)
CREAT SERPL-MCNC: 0.7 MG/DL (ref 0.5–0.9)
EOSINOPHIL # BLD: 0.2 K/UL (ref 0–0.4)
EOSINOPHILS RELATIVE PERCENT: 4 % (ref 1–4)
ERYTHROCYTE [DISTWIDTH] IN BLOOD BY AUTOMATED COUNT: 13.9 % (ref 12.5–15.4)
GFR SERPL CREATININE-BSD FRML MDRD: >60 ML/MIN/1.73M2
GLUCOSE SERPL-MCNC: 104 MG/DL (ref 70–99)
HCT VFR BLD AUTO: 41.3 % (ref 36–46)
HGB BLD-MCNC: 13.9 G/DL (ref 12–16)
LYMPHOCYTES NFR BLD: 1.9 K/UL (ref 1–4.8)
LYMPHOCYTES RELATIVE PERCENT: 34 % (ref 24–44)
MCH RBC QN AUTO: 29.5 PG (ref 26–34)
MCHC RBC AUTO-ENTMCNC: 33.8 G/DL (ref 31–37)
MCV RBC AUTO: 87.4 FL (ref 80–100)
MONOCYTES NFR BLD: 0.6 K/UL (ref 0.1–1.2)
MONOCYTES NFR BLD: 10 % (ref 2–11)
NEUTROPHILS NFR BLD: 51 % (ref 36–66)
NEUTS SEG NFR BLD: 3 K/UL (ref 1.8–7.7)
PLATELET # BLD AUTO: 268 K/UL (ref 140–450)
PMV BLD AUTO: 7.8 FL (ref 6–12)
POTASSIUM SERPL-SCNC: 4.4 MMOL/L (ref 3.7–5.3)
PROT SERPL-MCNC: 7 G/DL (ref 6.4–8.3)
RBC # BLD AUTO: 4.73 M/UL (ref 4–5.2)
SODIUM SERPL-SCNC: 144 MMOL/L (ref 135–144)
WBC OTHER # BLD: 5.7 K/UL (ref 3.5–11)

## 2023-08-01 PROCEDURE — 99223 1ST HOSP IP/OBS HIGH 75: CPT | Performed by: INTERNAL MEDICINE

## 2023-08-01 PROCEDURE — 2580000003 HC RX 258: Performed by: STUDENT IN AN ORGANIZED HEALTH CARE EDUCATION/TRAINING PROGRAM

## 2023-08-01 PROCEDURE — 6370000000 HC RX 637 (ALT 250 FOR IP)

## 2023-08-01 PROCEDURE — 80053 COMPREHEN METABOLIC PANEL: CPT

## 2023-08-01 PROCEDURE — 6360000002 HC RX W HCPCS: Performed by: STUDENT IN AN ORGANIZED HEALTH CARE EDUCATION/TRAINING PROGRAM

## 2023-08-01 PROCEDURE — 6360000002 HC RX W HCPCS

## 2023-08-01 PROCEDURE — 6370000000 HC RX 637 (ALT 250 FOR IP): Performed by: INTERNAL MEDICINE

## 2023-08-01 PROCEDURE — 85025 COMPLETE CBC W/AUTO DIFF WBC: CPT

## 2023-08-01 PROCEDURE — 2580000003 HC RX 258

## 2023-08-01 PROCEDURE — 36415 COLL VENOUS BLD VENIPUNCTURE: CPT

## 2023-08-01 RX ORDER — ACETAMINOPHEN 325 MG/1
650 TABLET ORAL EVERY 6 HOURS PRN
Status: DISCONTINUED | OUTPATIENT
Start: 2023-08-01 | End: 2023-08-01 | Stop reason: HOSPADM

## 2023-08-01 RX ORDER — LEVOFLOXACIN 750 MG/1
750 TABLET ORAL DAILY
Status: DISCONTINUED | OUTPATIENT
Start: 2023-08-01 | End: 2023-08-01 | Stop reason: HOSPADM

## 2023-08-01 RX ORDER — LEVOFLOXACIN 750 MG/1
750 TABLET ORAL DAILY
Qty: 10 TABLET | Refills: 0 | Status: SHIPPED | OUTPATIENT
Start: 2023-08-02 | End: 2023-08-12

## 2023-08-01 RX ADMIN — ASPIRIN 81 MG: 81 TABLET, COATED ORAL at 08:27

## 2023-08-01 RX ADMIN — SODIUM CHLORIDE, PRESERVATIVE FREE 10 ML: 5 INJECTION INTRAVENOUS at 08:29

## 2023-08-01 RX ADMIN — SODIUM CHLORIDE: 9 INJECTION, SOLUTION INTRAVENOUS at 06:59

## 2023-08-01 RX ADMIN — DULOXETINE HYDROCHLORIDE 60 MG: 30 CAPSULE, DELAYED RELEASE ORAL at 08:27

## 2023-08-01 RX ADMIN — LEVOFLOXACIN 750 MG: 750 TABLET, FILM COATED ORAL at 17:00

## 2023-08-01 RX ADMIN — PIPERACILLIN AND TAZOBACTAM 4500 MG: 4; .5 INJECTION, POWDER, LYOPHILIZED, FOR SOLUTION INTRAVENOUS at 08:27

## 2023-08-01 RX ADMIN — ENOXAPARIN SODIUM 40 MG: 100 INJECTION SUBCUTANEOUS at 08:28

## 2023-08-01 RX ADMIN — CELECOXIB 200 MG: 100 CAPSULE ORAL at 08:27

## 2023-08-01 RX ADMIN — ATORVASTATIN CALCIUM 10 MG: 10 TABLET, FILM COATED ORAL at 08:27

## 2023-08-01 RX ADMIN — PIPERACILLIN AND TAZOBACTAM 4500 MG: 4; .5 INJECTION, POWDER, LYOPHILIZED, FOR SOLUTION INTRAVENOUS at 00:46

## 2023-08-01 NOTE — PROGRESS NOTES
Occupational [address]  Occupational Therapy Not Seen Note    DATE: 2023    NAME: Jason Garcia  MRN: [id number]   : 1955      Patient not seen this date for Occupational Therapy due to:    Patient demonstrated WNL ROM B UEs, independent with transfers and ambulation in room and fonseca. Patient independent with ADLs and functional tasks with no acute OT needs. Will defer OT evaluation at this time. Please reorder OT if future needs arise.      Next Scheduled Treatment: D/C OT    Electronically signed by Geneva Sue on 2023 at 11:51 AM
Physical Therapy        Physical Therapy Cancel Note      DATE: 2023    NAME: Darryle Maidens  MRN: [id number]   : 1955      Patient not seen this date for Physical Therapy due to:    Patient independent with functional mobility. Will defer PT evaluation at this time. Please reorder PT if future needs arise.        Electronically signed by Emili Cooley PT on 2023 at 11:51 AM
discretion. Patient has remained afebrile and hemodynamically stable and well appearing with mild intermittent headaches. Ok to DC today with close follow up with ENT and PCP. ID did not indicate need for follow up with their clinic. Leonard Stevens DO  Family Medicine Resident, PGY-2   8/1/2023  7:08 PM     Attending Physician Statement  I have seen and discussed the care of Kevin Marrufo including pertinent history and exam findings, with the resident. I have reviewed the key elements of all parts of the encounter with the resident at the time of the encounter. I agree with the assessment, plan and orders as documented by the resident. Discussed plan with the patient and she was in agreement with the plan. All questions answered.     Rossy Rainey DO, MPH  8/1/2023

## 2023-08-01 NOTE — DISCHARGE INSTRUCTIONS
Date of admission: 7/31/2023  Date of discharge: 08/01/23    Your care was provided by the attending and resident physicians of the [address] [place], [place] [address]Th [facility]. The encounter diagnosis was Mastoiditis of right side. FOLLOW-UP  - Follow up with your primary care physician in 3-4 days  - Follow up with your otolaryngologist as soon as possible. MEDICATIONS  -  your Levofloxacin 750mg from the pharmacy and take as directed. - Continue taking your other home medications as directed. ADDITIONAL INSTRUCTIONS  - We will call you if blood cultures come up for anything specific, and if your symptoms worsen, start having fevers, or have any other concerns come back to [facility] emergency department immediately.

## 2023-08-01 NOTE — CARE COORDINATION
Case Management Assessment  Initial Evaluation    Date/Time of Evaluation: 8/1/2023 5:24 PM  Assessment Completed by: Rohan Kellogg RN    If patient is discharged prior to next notation, then this note serves as note for discharge by case management. Patient Name: Reynaldo Patricio                   YOB: 1955  Diagnosis: Mastoiditis [H70.90]  Mastoiditis of right side [H70.91]                   Date / Time: 7/31/2023  7:38 AM    Patient Admission Status: Inpatient   Readmission Risk (Low < 19, Mod (19-27), High > 27): Readmission Risk Score: 6.9    Current PCP: Nikia Alvarado MD  PCP verified by CM? Yes    Chart Reviewed: Yes      History Provided by: Patient  Patient Orientation: Alert and Oriented    Patient Cognition: Alert    Hospitalization in the last 30 days (Readmission):  No    If yes, Readmission Assessment in CM Navigator will be completed. Advance Directives:      Code Status: Full Code   Patient's Primary Decision Maker is: Legal Next of Kin      Discharge Planning:    Patient lives with: Alone Type of Home: House  Primary Care Giver: Self  Patient Support Systems include: Children   Current Financial resources: Medicare  Current community resources:    Current services prior to admission: None            Current DME:              Type of Home Care services:  None    ADLS  Prior functional level: Independent in ADLs/IADLs  Current functional level: Independent in ADLs/IADLs    PT AM-PAC:   /24  OT AM-PAC:   /24    Family can provide assistance at DC: Would you like Case Management to discuss the discharge plan with any other family members/significant others, and if so, who?  No  Plans to Return to Present Housing: Yes  Other Identified Issues/Barriers to RETURNING to current housing: none    Potential Assistance needed at discharge: N/A            Potential DME:    Patient expects to discharge to: [address] [place] [address] Road for transportation at discharge: [address]

## 2023-08-01 NOTE — CONSULTS
abnormality. Previous left lens replacement. SINUSES: Minimal mucosal thickening in ethmoid air cells. Again shown is partial opacification of the right mastoid air cells/mastoid effusion, similar to the prior studies. SOFT TISSUES/SKULL: No acute abnormality of the visualized skull or soft tissues. No acute intracranial abnormality identified. Similar chronic changes and remote right frontal lobe infarcts. Right mastoid effusion. Cultures:     Culture, Blood 1 [[id number]] Collected: 07/31/23 1204   Order Status: Completed Specimen: Blood Updated: 08/01/23 1407    Specimen Description . BLOOD    Special Requests LEFT ARM 20CC    Culture NO GROWTH 1 DAY   Culture, Blood 2 [[id number]] Collected: 07/31/23 1213   Order Status: Completed Specimen: Blood Updated: 08/01/23 1407    Specimen Description . BLOOD    Special Requests LEFT HAND 10CC    Culture NO GROWTH 1 DAY       Electronically signed by Mack Colon MD on 8/1/2023 at 4:07 PM      [facility]  Mack Colon MD  Perfect Serve messaging  OFFICE: [phone]    Thank you for allowing us to participate in the care of this patient. Please call with questions. This note is created with the assistance of a speech recognition program.  While intending to generate a document that actually reflects the content of the visit, the document can still have some errors including those of syntax and sound a like substitutions which may escape proof reading. In such instances, actual meaning can be extrapolated by contextual diversion.

## 2023-08-01 NOTE — DISCHARGE SUMMARY
[facility] Family Medicine Res[facility]  Inpatient Service    Discharge Summary     Patient ID: Darryle Maidens  :  1955   MRN: [id number]     ACCOUNT:  [de-identified]   Patient's PCP: Dorota Hightower MD  Admit Date: 2023   Discharge Date: 2023  Length of Stay: 1  Code Status:  Full Code  Admitting Physician: Tricia Dorsey DO  Discharge Physician: Garrett Jamison DO     Active Discharge Diagnoses:     Hospital Problem Lists:  Principal Problem:    Mastoiditis  Active Problems:    TIA (transient ischemic attack)    History of CVA (cerebrovascular accident)    Chronic pain    Depression    Hyperlipidemia    Neoplasm of parotid gland    Osteoporosis, post-menopausal    Primary cancer of parotid gland ([address])    Cerebrovascular accident (CVA) ([address])    Former cigarette smoker  Resolved Problems:    * No resolved hospital problems. *      Admission Condition:  fair    Discharged Condition: stable    Hospital Stay:     Hospital Course:  Darryle Maidens is a 74 y.o. female with a history of cerebral infarction 3 years ago without residual effects, HTN, HLD, neoplasm of the parotid gland removed in , breast cancer in  and  treated with radiation, chemo, and mastectomy, OA and osteopenia, anxiety, depression, peripheral vascular disease and GERD, who presents with headache and minor lightheadedness since Thursday admitted to the hospital for the management of Mastoiditis. In July she noticed that she had a cut in her ear, possibly from excess use of Q-tips, which her doctor noticed and he put her on antibiotics. She was given Cefadroxil 500 mg BID for 10 days on 7/10 and Cefdinir 300mg  BID for 7 days on  for treatment reportedly of external and middle ear infection. In between switching her antibiotics she says she had 1 day of watery milky/pus filled discharge.  After the second round of antibiotics she was having minor discharge that she would see on

## 2023-08-01 NOTE — PLAN OF CARE
Problem: Discharge Planning  Goal: Discharge to home or other facility with appropriate resources  7/31/2023 2122 by Marie Latif RN  Outcome: Progressing     Problem: Pain  Goal: Verbalizes/displays adequate comfort level or baseline comfort level  7/31/2023 2122 by Marie Latif RN  Outcome: Progressing     Problem: Safety - Adult  Goal: Free from fall injury  7/31/2023 2122 by Marie Latif RN  Outcome: Progressing

## 2023-08-02 ENCOUNTER — TELEPHONE (OUTPATIENT)
Dept: INTERNAL MEDICINE CLINIC | Age: 68
End: 2023-08-02

## 2023-08-02 NOTE — TELEPHONE ENCOUNTER
Care Transitions Initial Follow Up Call    Outreach made within 2 business days of discharge: Yes    Patient: Darryle Maidens Patient : 1955   MRN: [id number]  Reason for Admission: There are no discharge diagnoses documented for the most recent discharge.   Discharge Date: 23       Spoke with: call could not be completed     Discharge department/facility: [facility]    Scheduled appointment with PCP within 7-14 days    Follow Up  Future Appointments   Date Time Provider [address] [place] [address]   10/18/2023 11:30 AM [address] [facility] [address], MD [facility] [address] [facility]

## 2023-08-03 NOTE — TELEPHONE ENCOUNTER
Care Transitions Initial Follow Up Call    Outreach made within 2 business days of discharge: Yes    Patient: Cheryl Chan Patient : 1955   MRN: [id number]  Reason for Admission: There are no discharge diagnoses documented for the most recent discharge. Discharge Date: 23       Spoke with: SASHA not set up.      Discharge department/facility: [facility]        Follow Up  Future Appointments   Date Time Provider [address] [place] [address]   10/18/2023 11:30 AM [address], MD [facility] [address] [facility]

## 2023-08-05 LAB
MICROORGANISM SPEC CULT: NORMAL
MICROORGANISM SPEC CULT: NORMAL
SERVICE CMNT-IMP: NORMAL
SERVICE CMNT-IMP: NORMAL
SPECIMEN DESCRIPTION: NORMAL
SPECIMEN DESCRIPTION: NORMAL

## 2023-08-14 ENCOUNTER — TELEPHONE (OUTPATIENT)
Dept: NEUROLOGY | Age: 68
End: 2023-08-14

## 2023-08-14 NOTE — TELEPHONE ENCOUNTER
08 14 2023 I called times 2 to schedule new patient appointment (07 26 2023 [facility] and on 08 04 2023 there was no voicemail at [phone]), no response, I mailed a letter asking the patient to call the office to schedule this appointment.   KS

## 2023-10-31 ENCOUNTER — TELEPHONE (OUTPATIENT)
Dept: FAMILY MEDICINE CLINIC | Age: 68
End: 2023-10-31

## 2023-10-31 NOTE — TELEPHONE ENCOUNTER
Patient contacted office in regards to get schedule for her stress test. Writer noticed not a patient with [facility] family physician. Patient does follow with the [facility], Stress test was order by Cardiology. Writer informed patient could contact cardiologist office to see where they offer the stress test or could call her PCP. Writer did say if patient goes through [facility] our scheduling number is [phone] but recommends patient to call her cardiologist to see their recommendations. Patient voiced understandings.

## 2023-11-01 ENCOUNTER — OFFICE VISIT (OUTPATIENT)
Dept: SURGERY | Age: 68
End: 2023-11-01
Payer: MEDICARE

## 2023-11-01 VITALS
HEART RATE: 79 BPM | DIASTOLIC BLOOD PRESSURE: 82 MMHG | SYSTOLIC BLOOD PRESSURE: 129 MMHG | TEMPERATURE: 98.3 F | WEIGHT: 171 LBS | BODY MASS INDEX: 32.31 KG/M2

## 2023-11-01 DIAGNOSIS — Z85.3 HISTORY OF BREAST CANCER: Primary | ICD-10-CM

## 2023-11-01 PROCEDURE — 1090F PRES/ABSN URINE INCON ASSESS: CPT

## 2023-11-01 PROCEDURE — G8417 CALC BMI ABV UP PARAM F/U: HCPCS

## 2023-11-01 PROCEDURE — G8484 FLU IMMUNIZE NO ADMIN: HCPCS

## 2023-11-01 PROCEDURE — 1036F TOBACCO NON-USER: CPT

## 2023-11-01 PROCEDURE — 3074F SYST BP LT 130 MM HG: CPT

## 2023-11-01 PROCEDURE — 1123F ACP DISCUSS/DSCN MKR DOCD: CPT

## 2023-11-01 PROCEDURE — G8399 PT W/DXA RESULTS DOCUMENT: HCPCS

## 2023-11-01 PROCEDURE — 99214 OFFICE O/P EST MOD 30 MIN: CPT

## 2023-11-01 PROCEDURE — 3078F DIAST BP <80 MM HG: CPT

## 2023-11-01 PROCEDURE — 3017F COLORECTAL CA SCREEN DOC REV: CPT

## 2023-11-01 PROCEDURE — G8427 DOCREV CUR MEDS BY ELIG CLIN: HCPCS

## 2023-11-01 NOTE — PROGRESS NOTES
Mina Manzano is a 63 y.o. female with history of right breast cancer status post right mastectomy with reconstruction and LAT dorsi flap formation by Dr. Erica Duggan 2015. Who presents to [facility] clinic for ongoing right axilla and shoulder pain since procedure in . She has no complaints related to the breast.  States the pain has been ongoing with mild relief with Voltaren gel, patient states range of motion is decreased, has difficulty with daily activities. Past breast cancer-related hx:Pt had an Abnormal mammogram showing calc's at old lumpectomy site in right breast.  Core bx confirmed ER/SD+ ductal ca. Pt has previous breast CA - Stage I T1C N0 M0, ER/SD+, Her2- treated with Lumpectomy (re-excision margin  clear), radiation, AC chemo and tamoxifen. Underwent right SSM/SLNB with immed recon lat dorsi flap. Ductal ca, luminal A, Stage I.       Past Medical History:   Diagnosis Date    Anxiety     Cancer ([address])     right breast x 2, salivary gland    Cerebral artery occlusion with cerebral infarction (HCC)     Depression     GERD (gastroesophageal reflux disease)     History of CVA (cerebrovascular accident)     Hyperlipidemia     Hypertension     \"sometimes have high bloodpressure\" no treatment    Neoplasm of parotid gland     Osteoarthritis     Osteopenia     Peripheral vascular disease (HCC)     PONV (postoperative nausea and vomiting)     Primary cancer of parotid gland (HCC)     Shortness of breath     cannot climb 1 flight of stairs without becoming SOB, can do her own housework but she needs to rest during    Snores     Urinary frequency        Past Surgical History:   Procedure Laterality Date    BREAST RECONSTRUCTION Right 2014    BREAST SURGERY Right     breast lumpectomy with chemo and radiation    BUNIONECTOMY Left 2015    BUNIONECTOMY Right 10/10/2016    right foot bunionectomy     SECTION      X 1    COLONOSCOPY

## 2023-11-01 NOTE — PROGRESS NOTES
Visit Information    Have you changed or started any medications since your last visit including any over-the-counter medicines, vitamins, or herbal medicines? no   Have you stopped taking any of your medications? Is so, why? -  no  Are you having any side effects from any of your medications? - no    Have you seen any other physician or provider since your last visit?  no   Have you had any other diagnostic tests since your last visit?  no   Have you been seen in the emergency room and/or had an admission in a hospital since we last saw you?  no   Have you had your routine dental cleaning in the past 6 months?  no     Do you have an active MyChart account? If no, what is the barrier?   No:     Patient Care Team:  Jero Phan MD as PCP - General (Family Medicine)  Nga Martinez MD as PCP - Breast Clinic (Plastic Surgery)  Kushal Garcia MD as Consulting Physician (Hematology and Oncology)    Medical History Review  Past Medical, Family, and Social History reviewed and does not contribute to the patient presenting condition    Health Maintenance   Topic Date Due    HIV screen  Never done    Hepatitis C screen  Never done    Cervical cancer screen  01/09/2021    Breast cancer screen  01/16/2022    COVID-19 Vaccine (5 - Pfizer series) 09/06/2022    Pneumococcal 65+ years Vaccine (3 - PPSV23 or PCV20) 11/09/2022    Flu vaccine (1) 08/01/2023    A1C test (Diabetic or Prediabetic)  10/03/2023    Lipids  10/03/2023    Depression Monitoring  10/18/2023    Colorectal Cancer Screen  12/31/2030    DTaP/Tdap/Td vaccine (2 - Td or Tdap) 02/19/2031    DEXA (modify frequency per FRAX score)  Completed    Shingles vaccine  Completed    Hepatitis A vaccine  Aged Out    Hepatitis B vaccine  Aged Out    Hib vaccine  Aged Out    Meningococcal (ACWY) vaccine  Aged Out    Pneumococcal 0-64 years Vaccine  Discontinued    Diabetes screen  Discontinued

## 2023-11-08 ENCOUNTER — OFFICE VISIT (OUTPATIENT)
Dept: ORTHOPEDIC SURGERY | Age: 68
End: 2023-11-08

## 2023-11-08 VITALS — BODY MASS INDEX: 32.28 KG/M2 | HEIGHT: 61 IN | WEIGHT: 171 LBS

## 2023-11-08 DIAGNOSIS — Z01.818 PRE-OP TESTING: ICD-10-CM

## 2023-11-08 DIAGNOSIS — Z96.9 RETAINED ORTHOPEDIC HARDWARE: Primary | ICD-10-CM

## 2023-11-08 DIAGNOSIS — R52 PAIN: ICD-10-CM

## 2023-11-08 DIAGNOSIS — M25.531 RIGHT WRIST PAIN: ICD-10-CM

## 2023-11-08 NOTE — PROGRESS NOTES
[address] [facility]  [address] [address] [place] [address]  Dept: [phone]    Ambulatory Orthopedic Consult    CHIEF COMPLAINT:    Chief Complaint   Patient presents with    Pain     Rt wrist pain       HISTORY OF PRESENT ILLNESS:      The patient is a 74 y.o. RHD female who is being seen for consultation and evaluation of right wrist pain. She has a history of open reduction internal fixation right distal radius fracture done on 2016. She states she was doing well until roughly a few months ago when she noted pain in the wrist.  She notes the pain after activity and with range of motion. She admits to no alison numbness but it some shooting dysesthesias into her fingertips of the right hand. She denies any limitations with the right upper extremity aside from the fact that is painful throughout the day.     Past Medical History:    Past Medical History:   Diagnosis Date    Anxiety     Cancer ([address])     right breast x 2, salivary gland    Cerebral artery occlusion with cerebral infarction ([address])     Depression     GERD (gastroesophageal reflux disease)     History of CVA (cerebrovascular accident)     Hyperlipidemia     Hypertension     \"sometimes have high bloodpressure\" no treatment    Neoplasm of parotid gland     Osteoarthritis     Osteopenia     Peripheral vascular disease (HCC)     PONV (postoperative nausea and vomiting)     Primary cancer of parotid gland (HCC)     Shortness of breath     cannot climb 1 flight of stairs without becoming SOB, can do her own housework but she needs to rest during    Snores     Urinary frequency        Past Surgical History:    Past Surgical History:   Procedure Laterality Date    BREAST RECONSTRUCTION Right 2014    BREAST SURGERY Right     breast lumpectomy with chemo and radiation    BUNIONECTOMY Left 2015    BUNIONECTOMY Right 10/10/2016    right foot bunionectomy     SECTION

## 2023-11-09 RX ORDER — SODIUM CHLORIDE, SODIUM LACTATE, POTASSIUM CHLORIDE, CALCIUM CHLORIDE 600; 310; 30; 20 MG/100ML; MG/100ML; MG/100ML; MG/100ML
INJECTION, SOLUTION INTRAVENOUS CONTINUOUS
OUTPATIENT
Start: 2023-11-09

## 2023-11-09 NOTE — DISCHARGE INSTRUCTIONS
Pre-operative Instructions           NOTHING to eat or drink after midnight the night prior to surgery   (This includes gum, candy, mints, chewing tobacco, etc). Please arrive at the surgery center (Entrance B) by 5:45-6:00 AM on 11/21/2023 (or as directed by your surgeon's office). See Directons to Surgery Center below. Please take only the following medication(s) the day of surgery with a small sip of water: Gabapentin (Neurontin)    Hold Irbesartan morning of surgery. Please stop any blood thinning medications  AS DIRECTED BY PRESCRIBING PROVIDER! : Aspiring and Celebrex  Failure to stop these medications as instructed (too soon or too late) may result in injury to you, or your surgery may need to be rescheduled. Below is a list of some examples for your reference. Antiplatelets : (stop blood cells (called platelets) from sticking together and forming a blood clot):   Aspirin (Bufferin, Ecotrin), Clopidogrel (Plavix), Ticagrelor (Brilinta), Prasugrel (Effient), Dipyridamole/aspirin (Aggrenox), Ticlopidine (Ticlid), Eptifibatide (Integrilin)    Anticoagulants: (slow down your body's process of making clots): Warfarin (Coumadin), Rivaroxaban (Xarelto), Dabigatran (Pradaxa), Apixaban (Eliquis), Edoxaban (Savaysa), Heparin/Enoxaparin (Lovenox), Fondaparinux (Arixtra)    NSAIDS: Aspirin (Bufferin, Ecotrin), Ibuprofen (Motrin, Nuprin,Advil), Naproxen (Aleve),Meloxicam (Mobic), Celecoxib (Celebrex), Diclofenac (Voltaren), Etodolac (Lodine), Indomethacin, Ketorolac, Nabumetone, Oxaprozin (Daypro), Piroxicam (Feldene), Excedrin (has aspirin in it)    Herbals/supplements: Bromelain, Cinnamon, [employer], [place] (female ginseng), Fish oil, Garlic, [place],Ginkgo biloba, Grape seed extract, Turmeric, Vitamin E, etc....)    You may continue the rest of your medications through the night before surgery unless instructed otherwise.     If applicable:   -Do not take diabetic medications on the day of

## 2023-11-15 ENCOUNTER — HOSPITAL ENCOUNTER (OUTPATIENT)
Dept: PREADMISSION TESTING | Age: 68
Discharge: HOME | End: 2023-11-15
Payer: MEDICARE

## 2023-11-15 VITALS
DIASTOLIC BLOOD PRESSURE: 84 MMHG | TEMPERATURE: 96.8 F | OXYGEN SATURATION: 94 % | BODY MASS INDEX: 32.1 KG/M2 | RESPIRATION RATE: 16 BRPM | WEIGHT: 170 LBS | HEART RATE: 88 BPM | HEIGHT: 61 IN | SYSTOLIC BLOOD PRESSURE: 145 MMHG

## 2023-11-15 DIAGNOSIS — Z01.818 PRE-OP TESTING: ICD-10-CM

## 2023-11-15 LAB
ALBUMIN SERPL-MCNC: 3.8 G/DL (ref 3.5–5.2)
ALBUMIN/GLOB SERPL: 1 {RATIO} (ref 1–2.5)
ALP SERPL-CCNC: 82 U/L (ref 35–104)
ALT SERPL-CCNC: 25 U/L (ref 10–35)
ANION GAP SERPL CALCULATED.3IONS-SCNC: 10 MMOL/L (ref 9–16)
AST SERPL-CCNC: 28 U/L (ref 10–35)
BILIRUB SERPL-MCNC: 0.3 MG/DL (ref 0–1.2)
BILIRUB UR QL STRIP: NEGATIVE
BUN SERPL-MCNC: 12 MG/DL (ref 8–23)
CALCIUM SERPL-MCNC: 9 MG/DL (ref 8.6–10.4)
CHLORIDE SERPL-SCNC: 105 MMOL/L (ref 98–107)
CLARITY UR: CLEAR
CO2 SERPL-SCNC: 25 MMOL/L (ref 20–31)
COLOR UR: ABNORMAL
COMMENT: ABNORMAL
CREAT SERPL-MCNC: 0.7 MG/DL (ref 0.5–0.9)
ERYTHROCYTE [DISTWIDTH] IN BLOOD BY AUTOMATED COUNT: 13.4 % (ref 11.8–14.4)
GFR SERPL CREATININE-BSD FRML MDRD: >60 ML/MIN/1.73M2
GLUCOSE SERPL-MCNC: 108 MG/DL (ref 74–99)
GLUCOSE UR STRIP-MCNC: NEGATIVE MG/DL
HCT VFR BLD AUTO: 44.2 % (ref 36.3–47.1)
HGB BLD-MCNC: 14 G/DL (ref 11.9–15.1)
HGB UR QL STRIP.AUTO: NEGATIVE
KETONES UR STRIP-MCNC: ABNORMAL MG/DL
LEUKOCYTE ESTERASE UR QL STRIP: NEGATIVE
MCH RBC QN AUTO: 28.3 PG (ref 25.2–33.5)
MCHC RBC AUTO-ENTMCNC: 31.7 G/DL (ref 28.4–34.8)
MCV RBC AUTO: 89.3 FL (ref 82.6–102.9)
NITRITE UR QL STRIP: NEGATIVE
NRBC BLD-RTO: 0 PER 100 WBC
PH UR STRIP: 5.5 [PH] (ref 5–8)
PLATELET # BLD AUTO: 259 K/UL (ref 138–453)
PMV BLD AUTO: 10.2 FL (ref 8.1–13.5)
POTASSIUM SERPL-SCNC: 4.4 MMOL/L (ref 3.7–5.3)
PROT SERPL-MCNC: 7.4 G/DL (ref 6.6–8.7)
PROT UR STRIP-MCNC: NEGATIVE MG/DL
RBC # BLD AUTO: 4.95 M/UL (ref 3.95–5.11)
SODIUM SERPL-SCNC: 140 MMOL/L (ref 136–145)
SP GR UR STRIP: 1.03 (ref 1–1.03)
UROBILINOGEN UR STRIP-ACNC: NORMAL EU/DL (ref 0–1)
WBC OTHER # BLD: 8 K/UL (ref 3.5–11.3)

## 2023-11-15 PROCEDURE — 36415 COLL VENOUS BLD VENIPUNCTURE: CPT

## 2023-11-15 PROCEDURE — 85027 COMPLETE CBC AUTOMATED: CPT

## 2023-11-15 PROCEDURE — 81003 URINALYSIS AUTO W/O SCOPE: CPT

## 2023-11-15 PROCEDURE — 80053 COMPREHEN METABOLIC PANEL: CPT

## 2023-11-15 RX ORDER — PANTOPRAZOLE SODIUM 40 MG/1
40 TABLET, DELAYED RELEASE ORAL DAILY
COMMUNITY
Start: 2023-10-24

## 2023-11-15 RX ORDER — BUPRENORPHINE HYDROCHLORIDE 75 UG/1
75 FILM, SOLUBLE BUCCAL DAILY
COMMUNITY

## 2023-11-15 RX ORDER — VITAMIN E 268 MG
1 CAPSULE ORAL DAILY
COMMUNITY
Start: 2023-10-14

## 2023-11-15 RX ORDER — ASCORBIC ACID 500 MG
500 TABLET ORAL DAILY
COMMUNITY

## 2023-11-15 NOTE — DISCHARGE INSTRUCTIONS
Preoperative Instructions:    Stop eating solid foods at midnight the night prior to surgery. Stop drinking clear liquids at midnight the night prior to surgery. Arrive at the surgery center (Entrance B) by 6:00 on 11/21/2023  (or as directed by your surgeon's office). Please stop any blood thinning medications as directed by your surgeon or prescribing physician. Failure to stop certain medications may interfere with your scheduled surgery. These may include:  Aspirin, Warfarin (Coumadin), Clopidogrel (Plavix), Ibuprofen (Motrin, Advil), Naproxen (Aleve), Meloxicam (Mobic), Celecoxib (Celebrex), Eliquis, Pradaxa, Xarelto, Effient, Fish Oil, Herbal supplements. Vitamin E    You may continue the rest of your medications through the night before surgery unless instructed otherwise. Please take only the following medication(s) the day of surgery with a small sip of water:  Protonix, metoprolol/lopressor, gabapentin/neurontin                      (belbuca per prescribing provider)    No Avapro/irbesaran AM of surgery. Please use and bring inhalers the day of surgery. Please bring CPAP the day of surgery. PLEASE NOTE:  THE ABOVE (IF ANY) DISCONTINUED MEDS MAY ONLY BE FROM   \"CLEANING UP\" THE MED LIST AND WERE NOT ACTUALLY CANCELLED; SEE CHART FOR DETAILS AND ALWAYS CHECK WITH PRESCRIBING PROVIDER BEFORE DISCONTINUING ANY MEDICATIONS          ____________________________  ____________________________  Signature (Patient)           Signature/date(Provider)      REMINDERS:  ** If you are going home the day of your procedure, you will need a friend or family member to drive you home after your procedure. Your  must be 25years of age or older and able to sign off on your discharge instructions. Taxi cabs or any form of public transportation is not acceptable. ** It is preferable that the friend or family member stay at the hospital throughout your procedure.   ** If you are going home the

## 2023-11-15 NOTE — PROGRESS NOTES
Cassandra Zelaya will need cardiology appointment after stress test. Cardiology office requested PAT office to call pt and inform her of this. Writer phoned all contact numbers available for macros. No answer to any number.  Writer able to leave voicemail on primary number for Cassandra Zelaya.

## 2023-11-15 NOTE — PROGRESS NOTES
Anesthesia Focused Assessment    STOP-BANG Sleep Apnea Questionnaire    SNORE loudly (heard through closed doors)? Yes  TIRED, fatigued, sleepy during daytime? No  OBSERVED stopping breathing during sleep? No  High blood PRESSURE being treated? Yes    BMI over 35? No  AGE over 48? Yes  NECK circumference over 16\"? No  GENDER (male)? No             Total 3  High risk 5-8  Intermediate risk 3-4  Low risk 0-2    Obstructive Sleep Apnea: snores, denies apnea. If YES, machine used: no     Type 1 DM:   no  T2DM:  no    Coronary Artery Disease:  follows with [facility], scheduled for stress test.  Hypertension:  yes    Active smoker:  quit 1986, smoker for only a few years. Drinks Alcohol:  no    Dentition: benign    Defib / AICD / Pacemaker: no      Renal Failure/dialysis:  no    Patient was evaluated in PAT & anesthesia guidelines were applied. NPO guidelines, medication instructions and scheduled arrival time were reviewed with patient. I advised patient to please contact the surgeon's office, ahead of time if possible, if any new signs or symptoms of illness, infection, rash, etc    Hx of anesthesia complications:  PONV, dizzy postop. Family hx of anesthesia complications:  no                                                                                                                     Patient is a very poor historian. Case was discussed with Dr. Trey Lipscomb. Patient was inpatient in August, had evaluation by cardiology ([facility]) and echo completed at the time. She had follow up with cardiology 10/4/23, has stress test scheduled but it is after surgery. Cardiac clearance to be requested, anesthesia is requesting the stress test to be completed prior to surgery. Patient has neurology appointment as well 11/30 relating to her PFO \"recommendations for PFO closure if needed\" per cardiology note/referral.  Neurology clearance to be requested.     Office notes present in paper chart from

## 2023-11-16 ENCOUNTER — TELEPHONE (OUTPATIENT)
Dept: NEUROLOGY | Age: 68
End: 2023-11-16

## 2023-11-16 ENCOUNTER — HOSPITAL ENCOUNTER (OUTPATIENT)
Dept: PREADMISSION TESTING | Age: 68
Discharge: HOME | End: 2023-11-16

## 2023-11-16 ENCOUNTER — TELEPHONE (OUTPATIENT)
Dept: ORTHOPEDIC SURGERY | Age: 68
End: 2023-11-16

## 2023-11-30 ENCOUNTER — OFFICE VISIT (OUTPATIENT)
Dept: NEUROLOGY | Age: 68
End: 2023-11-30
Payer: MEDICARE

## 2023-11-30 VITALS
WEIGHT: 175 LBS | DIASTOLIC BLOOD PRESSURE: 76 MMHG | HEIGHT: 61 IN | SYSTOLIC BLOOD PRESSURE: 118 MMHG | HEART RATE: 79 BPM | BODY MASS INDEX: 33.04 KG/M2

## 2023-11-30 DIAGNOSIS — Q21.12 PFO (PATENT FORAMEN OVALE): ICD-10-CM

## 2023-11-30 DIAGNOSIS — G45.9 TIA (TRANSIENT ISCHEMIC ATTACK): Primary | ICD-10-CM

## 2023-11-30 PROCEDURE — 3078F DIAST BP <80 MM HG: CPT | Performed by: PSYCHIATRY & NEUROLOGY

## 2023-11-30 PROCEDURE — 1036F TOBACCO NON-USER: CPT | Performed by: PSYCHIATRY & NEUROLOGY

## 2023-11-30 PROCEDURE — 3017F COLORECTAL CA SCREEN DOC REV: CPT | Performed by: PSYCHIATRY & NEUROLOGY

## 2023-11-30 PROCEDURE — G8399 PT W/DXA RESULTS DOCUMENT: HCPCS | Performed by: PSYCHIATRY & NEUROLOGY

## 2023-11-30 PROCEDURE — G8484 FLU IMMUNIZE NO ADMIN: HCPCS | Performed by: PSYCHIATRY & NEUROLOGY

## 2023-11-30 PROCEDURE — 99204 OFFICE O/P NEW MOD 45 MIN: CPT | Performed by: PSYCHIATRY & NEUROLOGY

## 2023-11-30 PROCEDURE — G8427 DOCREV CUR MEDS BY ELIG CLIN: HCPCS | Performed by: PSYCHIATRY & NEUROLOGY

## 2023-11-30 PROCEDURE — G8417 CALC BMI ABV UP PARAM F/U: HCPCS | Performed by: PSYCHIATRY & NEUROLOGY

## 2023-11-30 PROCEDURE — 3074F SYST BP LT 130 MM HG: CPT | Performed by: PSYCHIATRY & NEUROLOGY

## 2023-11-30 PROCEDURE — 1090F PRES/ABSN URINE INCON ASSESS: CPT | Performed by: PSYCHIATRY & NEUROLOGY

## 2023-11-30 PROCEDURE — 1123F ACP DISCUSS/DSCN MKR DOCD: CPT | Performed by: PSYCHIATRY & NEUROLOGY

## 2023-11-30 NOTE — PROGRESS NOTES
[facility]  [address] [facility], [address] [place] [address].[address]  Ph: [phone] or [phone]  FAX: [phone]    Chief Complaint: Stroke, PFO     Dear Manolo Reid MD     I had the pleasure of seeing your patient today in neurology consultation for her symptoms. As you would recall Vadim Reyes is a 79 y.o. female. Right handed with history of TIA, prior stroke with left hemiparesis, uncontrolled hypertension, breast cancer s/p radiotherapy and chemotherapy, ischemic stroke, hypertension, hyperlipidemia, parotid gland neoplasm, PFO, urinary frequency, presents with headaches. She was hypertensive and admitted to [facility] stroke [facility] with systolic blood pressures of 240/160 as per patient's son. He witnessed her having difficulty to speak with right facial droop lasting for up to 1 hour. CT head, CTA of the head and neck were negative. She was not a tPA candidate. Blood pressure improved with medications. The headache was pressure-like associated with photophobia and phonophobia. During hospitalization to denied headache, dizziness, vision changes or speech or swallowing difficulties. She has a prior history of TIAs and prior strokes and was not on any blood thinners or lipid-lowering agents. In the ER she was loaded with aspirin and Plavix 300 mg.    10/3/022: LDL was 141 , A1C is 6.2 . EKG 10/2/22; NSR     Diagnostic data reviewed:  CT head 10/2/22: unremarkable. CTA head and neck 10/2/22: No significant stenosis or occlusion. MRI Brain 10/2/22: No acute intracranial abnormalities. Chronic encephalomalacia in right frontal convexity and right corona radiata extending into right lentiform nucleus. Chronic lacunar infarct in right cerebellar hemisphere. Echo (10/3/22): EF > 55%. No segmental wall motion abnormalities seen. Mild left ventricular hypertrophy. Grade I (mild) left ventricular diastolic dysfunction.    Positive bubble study

## 2023-12-21 ENCOUNTER — HOSPITAL ENCOUNTER (OUTPATIENT)
Age: 68
Setting detail: OUTPATIENT SURGERY
Discharge: HOME | End: 2023-12-21
Attending: STUDENT IN AN ORGANIZED HEALTH CARE EDUCATION/TRAINING PROGRAM | Admitting: STUDENT IN AN ORGANIZED HEALTH CARE EDUCATION/TRAINING PROGRAM
Payer: MEDICARE

## 2023-12-21 ENCOUNTER — APPOINTMENT (OUTPATIENT)
Dept: GENERAL RADIOLOGY | Age: 68
End: 2023-12-21
Attending: STUDENT IN AN ORGANIZED HEALTH CARE EDUCATION/TRAINING PROGRAM
Payer: MEDICARE

## 2023-12-21 VITALS
RESPIRATION RATE: 16 BRPM | DIASTOLIC BLOOD PRESSURE: 65 MMHG | TEMPERATURE: 98.2 F | SYSTOLIC BLOOD PRESSURE: 111 MMHG | OXYGEN SATURATION: 96 % | HEART RATE: 68 BPM

## 2023-12-21 DIAGNOSIS — G89.18 POST-OP PAIN: Primary | ICD-10-CM

## 2023-12-21 PROBLEM — Z96.9 PRESENCE OF RETAINED HARDWARE: Status: ACTIVE | Noted: 2023-12-21

## 2023-12-21 PROCEDURE — L3809 WHFO W/O JOINTS PRE OTS: HCPCS | Performed by: STUDENT IN AN ORGANIZED HEALTH CARE EDUCATION/TRAINING PROGRAM

## 2023-12-21 PROCEDURE — 20680 REMOVAL OF IMPLANT DEEP: CPT | Performed by: STUDENT IN AN ORGANIZED HEALTH CARE EDUCATION/TRAINING PROGRAM

## 2023-12-21 PROCEDURE — 3700000000 HC ANESTHESIA ATTENDED CARE: Performed by: STUDENT IN AN ORGANIZED HEALTH CARE EDUCATION/TRAINING PROGRAM

## 2023-12-21 PROCEDURE — 7100000010 HC PHASE II RECOVERY - FIRST 15 MIN: Performed by: STUDENT IN AN ORGANIZED HEALTH CARE EDUCATION/TRAINING PROGRAM

## 2023-12-21 PROCEDURE — 73110 X-RAY EXAM OF WRIST: CPT

## 2023-12-21 PROCEDURE — 7100000011 HC PHASE II RECOVERY - ADDTL 15 MIN: Performed by: STUDENT IN AN ORGANIZED HEALTH CARE EDUCATION/TRAINING PROGRAM

## 2023-12-21 PROCEDURE — 3600000005 HC SURGERY LEVEL 5 BASE: Performed by: STUDENT IN AN ORGANIZED HEALTH CARE EDUCATION/TRAINING PROGRAM

## 2023-12-21 PROCEDURE — 3700000001 HC ADD 15 MINUTES (ANESTHESIA): Performed by: STUDENT IN AN ORGANIZED HEALTH CARE EDUCATION/TRAINING PROGRAM

## 2023-12-21 PROCEDURE — 7100000000 HC PACU RECOVERY - FIRST 15 MIN: Performed by: STUDENT IN AN ORGANIZED HEALTH CARE EDUCATION/TRAINING PROGRAM

## 2023-12-21 PROCEDURE — 6360000002 HC RX W HCPCS: Performed by: STUDENT IN AN ORGANIZED HEALTH CARE EDUCATION/TRAINING PROGRAM

## 2023-12-21 PROCEDURE — 2709999900 HC NON-CHARGEABLE SUPPLY: Performed by: STUDENT IN AN ORGANIZED HEALTH CARE EDUCATION/TRAINING PROGRAM

## 2023-12-21 PROCEDURE — 7100000001 HC PACU RECOVERY - ADDTL 15 MIN: Performed by: STUDENT IN AN ORGANIZED HEALTH CARE EDUCATION/TRAINING PROGRAM

## 2023-12-21 PROCEDURE — 3600000015 HC SURGERY LEVEL 5 ADDTL 15MIN: Performed by: STUDENT IN AN ORGANIZED HEALTH CARE EDUCATION/TRAINING PROGRAM

## 2023-12-21 PROCEDURE — 2580000003 HC RX 258: Performed by: STUDENT IN AN ORGANIZED HEALTH CARE EDUCATION/TRAINING PROGRAM

## 2023-12-21 RX ORDER — MIDAZOLAM HYDROCHLORIDE 2 MG/2ML
2 INJECTION, SOLUTION INTRAMUSCULAR; INTRAVENOUS ONCE
Status: COMPLETED | OUTPATIENT
Start: 2023-12-21 | End: 2023-12-21

## 2023-12-21 RX ORDER — KETOROLAC TROMETHAMINE 30 MG/ML
30 INJECTION, SOLUTION INTRAMUSCULAR; INTRAVENOUS ONCE
Status: COMPLETED | OUTPATIENT
Start: 2023-12-21 | End: 2023-12-21

## 2023-12-21 RX ORDER — MAGNESIUM HYDROXIDE 1200 MG/15ML
LIQUID ORAL CONTINUOUS PRN
Status: DISCONTINUED | OUTPATIENT
Start: 2023-12-21 | End: 2023-12-21 | Stop reason: HOSPADM

## 2023-12-21 RX ORDER — SCOLOPAMINE TRANSDERMAL SYSTEM 1 MG/1
1 PATCH, EXTENDED RELEASE TRANSDERMAL
Status: DISCONTINUED | OUTPATIENT
Start: 2023-12-21 | End: 2023-12-21 | Stop reason: HOSPADM

## 2023-12-21 RX ORDER — HYDRALAZINE HYDROCHLORIDE 20 MG/ML
10 INJECTION INTRAMUSCULAR; INTRAVENOUS
Status: DISCONTINUED | OUTPATIENT
Start: 2023-12-21 | End: 2023-12-21 | Stop reason: HOSPADM

## 2023-12-21 RX ORDER — SODIUM CHLORIDE, SODIUM LACTATE, POTASSIUM CHLORIDE, CALCIUM CHLORIDE 600; 310; 30; 20 MG/100ML; MG/100ML; MG/100ML; MG/100ML
INJECTION, SOLUTION INTRAVENOUS CONTINUOUS
Status: DISCONTINUED | OUTPATIENT
Start: 2023-12-21 | End: 2023-12-21 | Stop reason: HOSPADM

## 2023-12-21 RX ORDER — SODIUM CHLORIDE 0.9 % (FLUSH) 0.9 %
5-40 SYRINGE (ML) INJECTION EVERY 12 HOURS SCHEDULED
Status: DISCONTINUED | OUTPATIENT
Start: 2023-12-21 | End: 2023-12-21 | Stop reason: HOSPADM

## 2023-12-21 RX ORDER — FENTANYL CITRATE 50 UG/ML
50 INJECTION, SOLUTION INTRAMUSCULAR; INTRAVENOUS ONCE
Status: COMPLETED | OUTPATIENT
Start: 2023-12-21 | End: 2023-12-21

## 2023-12-21 RX ORDER — ONDANSETRON 2 MG/ML
4 INJECTION INTRAMUSCULAR; INTRAVENOUS
Status: DISCONTINUED | OUTPATIENT
Start: 2023-12-21 | End: 2023-12-21 | Stop reason: HOSPADM

## 2023-12-21 RX ORDER — APREPITANT 40 MG/1
40 CAPSULE ORAL ONCE
Status: DISCONTINUED | OUTPATIENT
Start: 2023-12-21 | End: 2023-12-21 | Stop reason: HOSPADM

## 2023-12-21 RX ORDER — HYDROCODONE BITARTRATE AND ACETAMINOPHEN 5; 325 MG/1; MG/1
1 TABLET ORAL EVERY 6 HOURS PRN
Qty: 28 TABLET | Refills: 0 | Status: SHIPPED | OUTPATIENT
Start: 2023-12-21 | End: 2023-12-28

## 2023-12-21 RX ORDER — SODIUM CHLORIDE 0.9 % (FLUSH) 0.9 %
5-40 SYRINGE (ML) INJECTION PRN
Status: DISCONTINUED | OUTPATIENT
Start: 2023-12-21 | End: 2023-12-21 | Stop reason: HOSPADM

## 2023-12-21 RX ORDER — SODIUM CHLORIDE 9 MG/ML
INJECTION, SOLUTION INTRAVENOUS PRN
Status: DISCONTINUED | OUTPATIENT
Start: 2023-12-21 | End: 2023-12-21 | Stop reason: HOSPADM

## 2023-12-21 RX ADMIN — HYDROMORPHONE HYDROCHLORIDE 0.25 MG: 1 INJECTION, SOLUTION INTRAMUSCULAR; INTRAVENOUS; SUBCUTANEOUS at 12:29

## 2023-12-21 RX ADMIN — HYDROMORPHONE HYDROCHLORIDE 0.25 MG: 1 INJECTION, SOLUTION INTRAMUSCULAR; INTRAVENOUS; SUBCUTANEOUS at 12:38

## 2023-12-21 RX ADMIN — MIDAZOLAM HYDROCHLORIDE 2 MG: 1 INJECTION, SOLUTION INTRAMUSCULAR; INTRAVENOUS at 10:32

## 2023-12-21 RX ADMIN — FENTANYL CITRATE 50 MCG: 50 INJECTION INTRAMUSCULAR; INTRAVENOUS at 10:30

## 2023-12-21 RX ADMIN — KETOROLAC TROMETHAMINE 30 MG: 30 INJECTION, SOLUTION INTRAMUSCULAR; INTRAVENOUS at 13:38

## 2023-12-21 NOTE — H&P
History and Physical    Pt Name: Phil Escalante  MRN: [id number]  YOB: 1955  Date of evaluation: 12/21/2023  Primary Care Physician: Hilaria Woodard MD    SUBJECTIVE:   History of Chief Complaint:    Phil Escalante is a 79 y.o. female who is scheduled today for WRIST HARDWARE REMOVAL- RIGHT. She is s/p ORIF radial fracture in 2016. She reports she was told she had to have this hardware removed, but she opted not to initially, but states she now wants it removed. She is right hand dominant. She rates her right wrist pain a 3/10 today. She has cardiac and neurology clearances on file and patient has held her aspirin as directed by cardiology for today's procedure. Allergies  is allergic to latex, fluoxetine, oxycodone-acetaminophen, and tape [adhesive tape]. Medications  Prior to Admission medications    Medication Sig Start Date End Date Taking? Authorizing Provider   vitamin E 180 MG (400 UNIT) CAPS capsule Take 1 capsule by mouth daily Pt to stop 7 days prior to OR 10/14/23   David Lozada MD   pantoprazole (PROTONIX) 40 MG tablet Take 1 tablet by mouth daily  Patient not taking: Reported on 12/21/2023 10/24/23   David Lozada MD   diclofenac sodium (VOLTAREN) 1 % GEL Apply topically daily as needed  Patient not taking: Reported on 12/21/2023 10/18/23   David Lozada MD   buprenorphine (BELBUCA) 75 MCG FILM buccal film Place 1 Film inside cheek daily.     David Lozada MD   vitamin C (ASCORBIC ACID) 500 MG tablet Take 1 tablet by mouth daily    David Lozada MD   Metoprolol Succinate 100 MG CS24 Take 1 tablet by mouth daily    David Lozada MD   aspirin 81 MG EC tablet Take 1 tablet by mouth daily  Patient taking differently: Take 1 tablet by mouth daily Pt to stop 7 days prior to OR 5/25/23   Baron Mai DO   atorvastatin (LIPITOR) 10 MG tablet take 1 tablet by mouth once daily 10/12/22   David Lozada MD   celecoxib (CELEBREX) 200 MG

## 2024-01-03 ENCOUNTER — OFFICE VISIT (OUTPATIENT)
Dept: ORTHOPEDIC SURGERY | Age: 69
End: 2024-01-03

## 2024-01-03 VITALS — HEIGHT: 61 IN | BODY MASS INDEX: 32.66 KG/M2 | WEIGHT: 173 LBS

## 2024-01-03 DIAGNOSIS — Z96.9 RETAINED ORTHOPEDIC HARDWARE: ICD-10-CM

## 2024-01-03 DIAGNOSIS — Z48.89 POSTOPERATIVE VISIT: Primary | ICD-10-CM

## 2024-01-03 PROCEDURE — 99024 POSTOP FOLLOW-UP VISIT: CPT | Performed by: STUDENT IN AN ORGANIZED HEALTH CARE EDUCATION/TRAINING PROGRAM

## 2024-01-03 RX ORDER — CEPHALEXIN 500 MG/1
500 CAPSULE ORAL 3 TIMES DAILY
Qty: 21 CAPSULE | Refills: 0 | Status: SHIPPED | OUTPATIENT
Start: 2024-01-03 | End: 2024-01-10

## 2024-01-03 NOTE — PROGRESS NOTES
[facility] ORTHO SPECIALISTS  [address]  [facility] [address]  Dept: [phone]  Dept Fax: [phone]        Orthopaedic Clinic Follow Up      Subjective:   Surgery: Removal of deep implant right wrist   Date of Surgery: 12/21/23    Mami Cunningham is a 68 y.o. female who presents to the clinic today for routine followup regarding her right wrist. She had right wrist deep implant removal completed by Dr. Velásquez on 12/21/23. She is here today for her two week post op visit. Patient has no complaints at this time and has been using her wrist without restriction around the home. She has been able to perform activities around the house without pain and does not have any complaints about decreased ROM of the wrist. She states that she does occasionally bump the wrist on the wall. She has noted some erythema surrounding incision and some mild swelling but this has not caused pain. She has not had any drainage from the incision site. No fevers/chills.     ROS:  Gen: No fevers/chills   Cardio: no chest pain   Lungs: no shortness of breath   MSK: No pain right wrist   Neuro: no numbness, tingling, weakness     Objective :   Physical exam  Gen: AAOx3, no acute distress  Cardio: regular rate  Lungs: symmetrical chest expansion, no audible wheezing   MSK: Right wrist: Incision along the volar aspect of the wrist with mild amount of surrounding erythema distally near the wrist crease. The wound is not actively draining although very superficial skin layer does appear to be slightly open. No alison dehiscence of wound. There is moderate swelling along the volar aspect of the wrist although non tender to palpation. Flexion/extension of wrist without pain. Sensation intact to light touch median/radial/ulnar nerve distribution. Extremity is warm and well perfused.     Radiology:  No new imaging today.      Assessment:   68 y.o. year old female with the following:

## 2024-01-17 ENCOUNTER — OFFICE VISIT (OUTPATIENT)
Dept: ORTHOPEDIC SURGERY | Age: 69
End: 2024-01-17

## 2024-01-17 VITALS — HEIGHT: 61 IN | WEIGHT: 173 LBS | BODY MASS INDEX: 32.66 KG/M2

## 2024-01-17 DIAGNOSIS — M25.531 RIGHT WRIST PAIN: ICD-10-CM

## 2024-01-17 DIAGNOSIS — Z96.9 RETAINED ORTHOPEDIC HARDWARE: Primary | ICD-10-CM

## 2024-01-17 PROCEDURE — 99024 POSTOP FOLLOW-UP VISIT: CPT

## 2024-01-17 NOTE — PROGRESS NOTES
sensation intact to light touch. Radial pulse 2+.            Radiology:    No radiographs taken in office today.     Assessment:   68 y.o. year old female status post 4 weeks from right volar distal radius plate hardware removal  Plan:   -Overall she is doing very well.  She completed her antibiotics and has no concern for infection at this time.  Her incision continues to heal and closed.  She is using her arm unrestricted with minimal pain.  We discussed not soaking her incision until it is completely healed over otherwise she has no restrictions.  We discussed in detail the signs of infection including erythema, fever, fluctuance, drainage, and warmth.  She will back to the office if she experiences any concern otherwise she can follow-up as needed all questions and concerns were answered.  Patient was comfortable with and agrees with plan.    Electronically signed by Rao Billingsley DO PGY-3 on 1/17/2024 at 11:36 AM    This note is created with the assistance of a speech recognition program.  While intending to generate a document that actually reflects the content of the visit, the document can still have some errors including those of syntax and sound a like substitutions which may escape proof reading.  In such instances, actual meaning can be extrapolated by contextual diversion

## 2024-05-10 ENCOUNTER — HOSPITAL ENCOUNTER (OUTPATIENT)
Dept: MAMMOGRAPHY | Age: 69
End: 2024-05-10
Attending: SURGERY
Payer: MEDICARE

## 2024-05-10 DIAGNOSIS — Z12.31 SCREENING MAMMOGRAM, ENCOUNTER FOR: ICD-10-CM

## 2024-05-10 PROCEDURE — 77063 BREAST TOMOSYNTHESIS BI: CPT

## 2024-05-23 ENCOUNTER — HOSPITAL ENCOUNTER (OUTPATIENT)
Dept: MRI IMAGING | Age: 69
Discharge: HOME | End: 2024-05-25
Attending: SURGERY
Payer: MEDICARE

## 2024-05-23 DIAGNOSIS — T85.848A PAIN ASSOCIATED WITH RIGHT BREAST IMPLANT: ICD-10-CM

## 2024-05-23 LAB
CREAT SERPL-MCNC: 0.7 MG/DL (ref 0.5–0.9)
GFR, ESTIMATED: >90 ML/MIN/1.73M2

## 2024-05-23 PROCEDURE — 6360000004 HC RX CONTRAST MEDICATION: Performed by: SURGERY

## 2024-05-23 PROCEDURE — A9579 GAD-BASE MR CONTRAST NOS,1ML: HCPCS | Performed by: SURGERY

## 2024-05-23 PROCEDURE — 2580000003 HC RX 258: Performed by: SURGERY

## 2024-05-23 PROCEDURE — 36415 COLL VENOUS BLD VENIPUNCTURE: CPT

## 2024-05-23 PROCEDURE — 82565 ASSAY OF CREATININE: CPT

## 2024-05-23 PROCEDURE — C8908 MRI W/O FOL W/CONT, BREAST,: HCPCS

## 2024-05-23 RX ORDER — 0.9 % SODIUM CHLORIDE 0.9 %
40 INTRAVENOUS SOLUTION INTRAVENOUS ONCE
Status: COMPLETED | OUTPATIENT
Start: 2024-05-23 | End: 2024-05-23

## 2024-05-23 RX ORDER — SODIUM CHLORIDE 0.9 % (FLUSH) 0.9 %
10 SYRINGE (ML) INJECTION ONCE
Status: COMPLETED | OUTPATIENT
Start: 2024-05-23 | End: 2024-05-23

## 2024-05-23 RX ADMIN — SODIUM CHLORIDE 40 ML: 9 INJECTION, SOLUTION INTRAVENOUS at 14:07

## 2024-05-23 RX ADMIN — SODIUM CHLORIDE, PRESERVATIVE FREE 10 ML: 5 INJECTION INTRAVENOUS at 14:07

## 2024-05-23 RX ADMIN — GADOTERIDOL 17 ML: 279.3 INJECTION, SOLUTION INTRAVENOUS at 14:07

## 2024-06-19 ENCOUNTER — HOSPITAL ENCOUNTER (EMERGENCY)
Age: 69
Discharge: HOME | End: 2024-06-19
Attending: EMERGENCY MEDICINE
Payer: MEDICARE

## 2024-06-19 VITALS
HEIGHT: 61 IN | RESPIRATION RATE: 18 BRPM | DIASTOLIC BLOOD PRESSURE: 87 MMHG | TEMPERATURE: 97.8 F | BODY MASS INDEX: 33.99 KG/M2 | OXYGEN SATURATION: 94 % | HEART RATE: 88 BPM | WEIGHT: 180 LBS | SYSTOLIC BLOOD PRESSURE: 149 MMHG

## 2024-06-19 DIAGNOSIS — H92.03 OTALGIA OF BOTH EARS: Primary | ICD-10-CM

## 2024-06-19 LAB
SARS-COV-2 RDRP RESP QL NAA+PROBE: NOT DETECTED
SPECIMEN DESCRIPTION: NORMAL
SPECIMEN SOURCE: NORMAL
STREP A, MOLECULAR: NEGATIVE

## 2024-06-19 PROCEDURE — 99285 EMERGENCY DEPT VISIT HI MDM: CPT

## 2024-06-19 PROCEDURE — 87651 STREP A DNA AMP PROBE: CPT

## 2024-06-19 PROCEDURE — 6370000000 HC RX 637 (ALT 250 FOR IP): Performed by: EMERGENCY MEDICINE

## 2024-06-19 PROCEDURE — 87635 SARS-COV-2 COVID-19 AMP PRB: CPT

## 2024-06-19 RX ORDER — IBUPROFEN 800 MG/1
800 TABLET ORAL ONCE
Status: COMPLETED | OUTPATIENT
Start: 2024-06-19 | End: 2024-06-19

## 2024-06-19 RX ADMIN — IBUPROFEN 800 MG: 800 TABLET ORAL at 06:16

## 2024-06-19 ASSESSMENT — PAIN - FUNCTIONAL ASSESSMENT: PAIN_FUNCTIONAL_ASSESSMENT: 0-10

## 2024-06-19 ASSESSMENT — PAIN SCALES - GENERAL: PAINLEVEL_OUTOF10: 9

## 2024-06-19 ASSESSMENT — PAIN DESCRIPTION - LOCATION: LOCATION: ABDOMEN

## 2024-06-19 NOTE — ED PROVIDER NOTES
[facility] Emergency Department  [address]  [facility] [address]  Phone: [phone]  Fax: [phone]    EMERGENCY DEPARTMENT ENCOUNTER          Pt Name: Mami Cunningham  MRN: [id number]  Birthdate 1955  Date of evaluation: 2024      CHIEF COMPLAINT       Chief Complaint   Patient presents with    Abdominal Pain    Headache    Otalgia    Shortness of Breath       HISTORY OF PRESENT ILLNESS       Mami Cunningham is a 68 y.o. female who presents with bilateral ear pain and sore throat.  NO fever.  No trouble swallowing.  Also complains of headache.  NO fever.  Denies shortness of breath to me.      REVIEW OF SYSTEMS       Review of Systems  Otherwise negative other than stated in HPI    PAST MEDICAL HISTORY    has a past medical history of Anxiety, Cancer (HCC), Cerebral artery occlusion with cerebral infarction (HCC), Depression, GERD (gastroesophageal reflux disease), History of CVA (cerebrovascular accident), Hyperlipidemia, Hypertension, Neoplasm of parotid gland, Osteoarthritis, Osteopenia, Peripheral vascular disease (HCC), PFO (patent foramen ovale), PONV (postoperative nausea and vomiting), Poor historian, Primary cancer of parotid gland (HCC), Shortness of breath, Snores, Under care of team, Urinary frequency, and Wellness examination.    SURGICAL HISTORY      has a past surgical history that includes Nose surgery; Varicose vein surgery (Bilateral); Foot surgery (Bilateral);  section; Mastectomy (Right, 2014); Bunionectomy (Left, 2015); lipoma resection (Right); Wrist fracture surgery (Right, 2016); Bunionectomy (Right, 10/10/2016); Salivary gland surgery; Breast surgery (Right, ); Breast reconstruction (Right, 2014); eye surgery (Bilateral); pr arthrp kne condyle&platu medial&lat compartments (Left, 2018); Upper gastrointestinal endoscopy (N/A, 2020); Colonoscopy (N/A, 2020); Total hip arthroplasty  rupture is not excludable on the submitted images.  No abnormal axillary lymph nodes.  No abnormality in the internal mammary chain. Nipple area is unremarkable. Left Breast:  No areas of suspicious mass-like or nonmass-like parenchymal contrast enhancement above background noted.  No abnormal left axillary lymph nodes.  No abnormality in the internal mammary chain.  Nipple areolar complex is grossly unremarkable. Extra Mammary Tissues:  There is a questionable lesion in the right lobe of the liver, lobular in appearance with markedly increased vascularity measuring 4.2 x 3.5 cm.  Lesion is indeterminate from the sequences provided. Further workup is advised.  Advise contrast enhanced multiphasic CT scanning of the abdomen attention to the liver with hemangioma protocol.     1. Right Breast:  No MRI evidence of malignancy.  Implant appears grossly intact.  Intracapsular implant compromise not excluded due to the nature of the study. 2. No MRI evidence of malignancy left breast. 3. Abnormal lesion in the right lobe of the liver, indeterminate measuring approximately 3.4 cm.  Lesion is highly vascular.  This may represent a hemangioma, further assessment is advised.  Would advise contrast enhanced CT scan of the abdomen attention to the liver using hemangioma protocol versus MRI imaging of the abdomen with gadolinium contrast enhancement. BI-RADS 2 BIRADS: BIRADS - CATEGORY 2 Benign Findings.  Normal interval follow-up is recommended in 12 months. HOWEVER, ADDITIONAL LIVER IMAGING IS RECOMMENDED. OVERALL ASSESSMENT - BENIGN      LABS:  Results for orders placed or performed during the hospital encounter of 06/19/24   COVID-19, Rapid    Specimen: Nasopharyngeal Swab   Result Value Ref Range    Specimen Description .NASOPHARYNGEAL SWAB     SARS-CoV-2, Rapid PENDING               EMERGENCY DEPARTMENT COURSE/MDM:     Medical Decision Making  Amount and/or Complexity of Data Reviewed  Labs: ordered.        The patient was

## 2024-11-06 ENCOUNTER — OFFICE VISIT (OUTPATIENT)
Dept: ORTHOPEDIC SURGERY | Age: 69
End: 2024-11-06

## 2024-11-06 VITALS — HEIGHT: 61 IN | BODY MASS INDEX: 33.99 KG/M2 | WEIGHT: 180 LBS

## 2024-11-06 DIAGNOSIS — R22.31 MASS OF RIGHT WRIST: ICD-10-CM

## 2024-11-06 DIAGNOSIS — M25.531 RIGHT WRIST PAIN: Primary | ICD-10-CM

## 2024-11-06 NOTE — PROGRESS NOTES
[facility] ORTHO SPECIALISTS  [address]  [facility] [address]  Dept: [phone]  Dept Fax: [phone]        Ambulatory Follow Up    Subjective:   Mami Cunningham is a 68 y.o. year old female who presents to our office today for routine followup regarding:   her right distal radius deep implant removal on 12/21/2023.  Patient's postoperative course was uncomplicated, however she did have some surgical site erythema which was treated with outpatient antibiotics.  The patient reports a small nodule just radial to her surgical incision which is very bothersome.  She reports is very tender to palpation, she feels that the mass has grown slowly over the last year.  She reports some baseline this the just her digits, which may be related to prior chemotherapy. She has a history of breast cancer status post right mastectomy by Dr. Jiménez and reconstruction with LAT dorsi flap by Dr. Romano in  01/2015. 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 She reports to be cancer free for approximately 5 years.     HPI    Review of Systems   Constitutional: Negative for fever and chills.   HENT: Negative for congestion.    Eyes: Negative for blurred vision and double vision.   Respiratory: Negative for cough, shortness of breath and wheezing.    Cardiovascular: Negative for chest pain and palpitations.   Gastrointestinal: Negative for nausea. Negative for vomiting.   Musculoskeletal: Positive for right wrist pain  Skin: Negative for itching and rash.   Neurological: Negative for dizziness, sensory change and headaches.   Psychiatric/Behavioral: Negative for depression and suicidal ideas.       Objective :   General: AAOx3, NAD, appears stated age  Ortho Exam  RUE: Surgical incision well-healed.  Approximately 0.5 x 0.5 cm well-circumscribed mass on the volar aspect of the wrist, just radial to the surgical site.  TTP with palpation of the mass no

## 2024-11-18 ENCOUNTER — OFFICE VISIT (OUTPATIENT)
Dept: NEUROLOGY | Age: 69
End: 2024-11-18
Payer: MEDICARE

## 2024-11-18 VITALS
SYSTOLIC BLOOD PRESSURE: 124 MMHG | DIASTOLIC BLOOD PRESSURE: 87 MMHG | HEIGHT: 61 IN | HEART RATE: 99 BPM | BODY MASS INDEX: 34.93 KG/M2 | WEIGHT: 185 LBS

## 2024-11-18 DIAGNOSIS — Q21.12 PFO (PATENT FORAMEN OVALE): ICD-10-CM

## 2024-11-18 DIAGNOSIS — G45.9 TIA (TRANSIENT ISCHEMIC ATTACK): Primary | ICD-10-CM

## 2024-11-18 PROCEDURE — 1036F TOBACCO NON-USER: CPT | Performed by: PSYCHIATRY & NEUROLOGY

## 2024-11-18 PROCEDURE — 1159F MED LIST DOCD IN RCRD: CPT | Performed by: PSYCHIATRY & NEUROLOGY

## 2024-11-18 PROCEDURE — 1123F ACP DISCUSS/DSCN MKR DOCD: CPT | Performed by: PSYCHIATRY & NEUROLOGY

## 2024-11-18 PROCEDURE — G8484 FLU IMMUNIZE NO ADMIN: HCPCS | Performed by: PSYCHIATRY & NEUROLOGY

## 2024-11-18 PROCEDURE — 3079F DIAST BP 80-89 MM HG: CPT | Performed by: PSYCHIATRY & NEUROLOGY

## 2024-11-18 PROCEDURE — 1090F PRES/ABSN URINE INCON ASSESS: CPT | Performed by: PSYCHIATRY & NEUROLOGY

## 2024-11-18 PROCEDURE — 3074F SYST BP LT 130 MM HG: CPT | Performed by: PSYCHIATRY & NEUROLOGY

## 2024-11-18 PROCEDURE — 99214 OFFICE O/P EST MOD 30 MIN: CPT | Performed by: PSYCHIATRY & NEUROLOGY

## 2024-11-18 PROCEDURE — G8399 PT W/DXA RESULTS DOCUMENT: HCPCS | Performed by: PSYCHIATRY & NEUROLOGY

## 2024-11-18 PROCEDURE — 3017F COLORECTAL CA SCREEN DOC REV: CPT | Performed by: PSYCHIATRY & NEUROLOGY

## 2024-11-18 PROCEDURE — G8427 DOCREV CUR MEDS BY ELIG CLIN: HCPCS | Performed by: PSYCHIATRY & NEUROLOGY

## 2024-11-18 PROCEDURE — G8417 CALC BMI ABV UP PARAM F/U: HCPCS | Performed by: PSYCHIATRY & NEUROLOGY

## 2024-11-18 NOTE — PROGRESS NOTES
bunionectomy     SECTION      X 1    COLONOSCOPY N/A 2020    COLONOSCOPY POLYPECTOMY SNARE/COLD BIOPSY performed by Chapin Ngo MD at [facility] OR    EYE SURGERY Bilateral     cataract extraction    FOOT SURGERY Bilateral     LIPOMA RESECTION Right     benign mass removed behind right ear    MASTECTOMY Right 2014    skin sparing with sentinel node biopsy and reconstruction with tissue expander implant    NOSE SURGERY      NM ARTHRP KNE CONDYLE&PLATU MEDIAL&LAT COMPARTMENTS Left 2018    ROBOTIC LEFT KNEE TOTAL ARTHROPLASTY performed by Sascha Parra MD at [facility] OR    SALIVARY GLAND SURGERY      for cancer    TOTAL HIP ARTHROPLASTY Right     in [place]    UPPER GASTROINTESTINAL ENDOSCOPY N/A 2020    EGD BIOPSY performed by Chapin Ngo MD at [facility] OR    VARICOSE VEIN SURGERY Bilateral     WRIST FRACTURE SURGERY Right 2016    [facility]     Allergies   Allergen Reactions    Latex Rash     Developed moderate desquamating erythematous rash from tape on skin at time of right mastectomy 2014. (Pr orthopedic office patient has a latex allergy 2018)    Fluoxetine Other (See Comments)     More depressed    Oxycodone-Acetaminophen Other (See Comments)     Patient unsure    Tape [Adhesive Tape]      Family History   Problem Relation Age of Onset    Cancer Father     Diabetes Brother       Social History     Socioeconomic History    Marital status:      Spouse name: Not on file    Number of children: Not on file    Years of education: Not on file    Highest education level: Not on file   Occupational History    Not on file   Tobacco Use    Smoking status: Former     Packs/day: 0.25     Years: 2.00     Additional pack years: 0.00     Total pack years: 0.50     Types: Cigarettes     Start date:      Quit date: 10/6/1986     Years since quittin.1    Smokeless tobacco: Never   Vaping Use    Vaping Use: Never used   Substance and Sexual Activity    Alcohol use: No

## 2024-11-21 RX ORDER — ATORVASTATIN CALCIUM 40 MG/1
40 TABLET, FILM COATED ORAL DAILY
Qty: 30 TABLET | Refills: 5 | Status: SHIPPED | OUTPATIENT
Start: 2024-11-21 | End: 2025-02-19

## 2024-12-06 ENCOUNTER — HOSPITAL ENCOUNTER (OUTPATIENT)
Dept: MRI IMAGING | Age: 69
Discharge: HOME | End: 2024-12-08
Payer: MEDICARE

## 2024-12-06 DIAGNOSIS — M25.531 RIGHT WRIST PAIN: ICD-10-CM

## 2024-12-06 DIAGNOSIS — R22.31 MASS OF RIGHT WRIST: ICD-10-CM

## 2024-12-06 LAB
CREAT SERPL-MCNC: 0.8 MG/DL (ref 0.5–0.9)
GFR, ESTIMATED: 81 ML/MIN/1.73M2

## 2024-12-06 PROCEDURE — 2580000003 HC RX 258: Performed by: STUDENT IN AN ORGANIZED HEALTH CARE EDUCATION/TRAINING PROGRAM

## 2024-12-06 PROCEDURE — 73223 MRI JOINT UPR EXTR W/O&W/DYE: CPT

## 2024-12-06 PROCEDURE — A9579 GAD-BASE MR CONTRAST NOS,1ML: HCPCS | Performed by: STUDENT IN AN ORGANIZED HEALTH CARE EDUCATION/TRAINING PROGRAM

## 2024-12-06 PROCEDURE — 6360000004 HC RX CONTRAST MEDICATION: Performed by: STUDENT IN AN ORGANIZED HEALTH CARE EDUCATION/TRAINING PROGRAM

## 2024-12-06 PROCEDURE — 82565 ASSAY OF CREATININE: CPT

## 2024-12-06 RX ORDER — SODIUM CHLORIDE 0.9 % (FLUSH) 0.9 %
10 SYRINGE (ML) INJECTION ONCE
Status: COMPLETED | OUTPATIENT
Start: 2024-12-06 | End: 2024-12-06

## 2024-12-06 RX ADMIN — SODIUM CHLORIDE, PRESERVATIVE FREE 10 ML: 5 INJECTION INTRAVENOUS at 15:00

## 2024-12-06 RX ADMIN — GADOTERIDOL 17 ML: 279.3 INJECTION, SOLUTION INTRAVENOUS at 14:59

## 2024-12-12 ENCOUNTER — OFFICE VISIT (OUTPATIENT)
Dept: FAMILY MEDICINE CLINIC | Age: 69
End: 2024-12-12
Payer: MEDICARE

## 2024-12-12 VITALS — SYSTOLIC BLOOD PRESSURE: 122 MMHG | RESPIRATION RATE: 14 BRPM | DIASTOLIC BLOOD PRESSURE: 78 MMHG | TEMPERATURE: 98 F

## 2024-12-12 DIAGNOSIS — H66.001 NON-RECURRENT ACUTE SUPPURATIVE OTITIS MEDIA OF RIGHT EAR WITHOUT SPONTANEOUS RUPTURE OF TYMPANIC MEMBRANE: Primary | ICD-10-CM

## 2024-12-12 PROCEDURE — 1123F ACP DISCUSS/DSCN MKR DOCD: CPT | Performed by: NURSE PRACTITIONER

## 2024-12-12 PROCEDURE — 3074F SYST BP LT 130 MM HG: CPT | Performed by: NURSE PRACTITIONER

## 2024-12-12 PROCEDURE — 99213 OFFICE O/P EST LOW 20 MIN: CPT | Performed by: NURSE PRACTITIONER

## 2024-12-12 PROCEDURE — 1090F PRES/ABSN URINE INCON ASSESS: CPT | Performed by: NURSE PRACTITIONER

## 2024-12-12 PROCEDURE — 3017F COLORECTAL CA SCREEN DOC REV: CPT | Performed by: NURSE PRACTITIONER

## 2024-12-12 PROCEDURE — G8417 CALC BMI ABV UP PARAM F/U: HCPCS | Performed by: NURSE PRACTITIONER

## 2024-12-12 PROCEDURE — G8399 PT W/DXA RESULTS DOCUMENT: HCPCS | Performed by: NURSE PRACTITIONER

## 2024-12-12 PROCEDURE — 1159F MED LIST DOCD IN RCRD: CPT | Performed by: NURSE PRACTITIONER

## 2024-12-12 PROCEDURE — 1036F TOBACCO NON-USER: CPT | Performed by: NURSE PRACTITIONER

## 2024-12-12 PROCEDURE — G8484 FLU IMMUNIZE NO ADMIN: HCPCS | Performed by: NURSE PRACTITIONER

## 2024-12-12 PROCEDURE — 3078F DIAST BP <80 MM HG: CPT | Performed by: NURSE PRACTITIONER

## 2024-12-12 PROCEDURE — G8427 DOCREV CUR MEDS BY ELIG CLIN: HCPCS | Performed by: NURSE PRACTITIONER

## 2024-12-12 RX ORDER — CETIRIZINE HYDROCHLORIDE 10 MG/1
10 TABLET ORAL DAILY
Qty: 30 TABLET | Refills: 0 | Status: SHIPPED | OUTPATIENT
Start: 2024-12-12

## 2024-12-12 ASSESSMENT — ENCOUNTER SYMPTOMS
DIARRHEA: 0
ABDOMINAL PAIN: 0
COUGH: 0
VOMITING: 0
SORE THROAT: 0
RHINORRHEA: 0

## 2024-12-12 NOTE — PROGRESS NOTES
[facility] PHYSICIANS [facility] WALK-IN  [address]  [facility] [address]  Dept: [phone]  Dept Fax: [phone]    Mami Cunningham is a 69 y.o. female who presents to the urgent care today for her medical conditions/complaints as notedbelow.  Mami Cunningham is c/o of Ear Fullness (R ear infected )      HPI:     69 yr old female rt ear pain, no change hearing, some mild nasal congestion  Feels well      Ear Fullness   There is pain in the right ear. This is a new problem. The current episode started 1 to 4 weeks ago. The problem occurs constantly. The problem has been unchanged. There has been no fever. Pertinent negatives include no abdominal pain, coughing, diarrhea, ear discharge, headaches, hearing loss, neck pain, rash, rhinorrhea, sore throat or vomiting. She has tried nothing for the symptoms. The treatment provided mild relief.       Past Medical History:   Diagnosis Date    Anxiety     Cancer (HCC) 2014    right breast x 2, salivary gland;  sees Dr Mowat    Cerebral artery occlusion with cerebral infarction (HCC)     Depression     GERD (gastroesophageal reflux disease)     History of CVA (cerebrovascular accident) 2020    pt reports no residual effects    Hyperlipidemia     Hypertension     Neoplasm of parotid gland     Osteoarthritis     Osteopenia     Peripheral vascular disease (HCC)     PFO (patent foramen ovale) 2023    on echo    PONV (postoperative nausea and vomiting)     Poor historian     Primary cancer of parotid gland (HCC)     Shortness of breath     cannot climb 1 flight of stairs without becoming SOB, can do her own housework but she needs to rest during    Snores     Under care of team     Dr Ely, [facility]    Urinary frequency     Wellness examination     Dr Richar Gomez; pt states had appt \"about\" Oct 2023        Current Outpatient Medications   Medication Sig Dispense Refill    amoxicillin-clavulanate

## 2024-12-30 ENCOUNTER — HOSPITAL ENCOUNTER (OUTPATIENT)
Age: 69
Setting detail: SPECIMEN
Discharge: HOME | End: 2024-12-30

## 2024-12-30 ENCOUNTER — OFFICE VISIT (OUTPATIENT)
Dept: FAMILY MEDICINE CLINIC | Age: 69
End: 2024-12-30
Payer: MEDICARE

## 2024-12-30 VITALS
SYSTOLIC BLOOD PRESSURE: 124 MMHG | OXYGEN SATURATION: 97 % | DIASTOLIC BLOOD PRESSURE: 82 MMHG | RESPIRATION RATE: 14 BRPM | TEMPERATURE: 99.6 F

## 2024-12-30 DIAGNOSIS — R23.8 ABNORMAL SKIN COLOR: ICD-10-CM

## 2024-12-30 DIAGNOSIS — A08.4 VIRAL GASTROENTERITIS: Primary | ICD-10-CM

## 2024-12-30 DIAGNOSIS — A08.4 VIRAL GASTROENTERITIS: ICD-10-CM

## 2024-12-30 LAB
ALBUMIN SERPL-MCNC: 4.1 G/DL (ref 3.5–5.2)
ALBUMIN/GLOB SERPL: 1.1 {RATIO} (ref 1–2.5)
ALP SERPL-CCNC: 76 U/L (ref 35–104)
ALT SERPL-CCNC: 47 U/L (ref 10–35)
ANION GAP SERPL CALCULATED.3IONS-SCNC: 13 MMOL/L (ref 9–16)
AST SERPL-CCNC: 50 U/L (ref 10–35)
BASOPHILS # BLD: 0.03 K/UL (ref 0–0.2)
BASOPHILS NFR BLD: 1 % (ref 0–2)
BILIRUB SERPL-MCNC: 0.6 MG/DL (ref 0–1.2)
BUN SERPL-MCNC: 13 MG/DL (ref 8–23)
CALCIUM SERPL-MCNC: 9.9 MG/DL (ref 8.6–10.4)
CHLORIDE SERPL-SCNC: 106 MMOL/L (ref 98–107)
CO2 SERPL-SCNC: 24 MMOL/L (ref 20–31)
CREAT SERPL-MCNC: 0.9 MG/DL (ref 0.6–0.9)
EOSINOPHIL # BLD: 0.1 K/UL (ref 0–0.44)
EOSINOPHILS RELATIVE PERCENT: 2 % (ref 1–4)
ERYTHROCYTE [DISTWIDTH] IN BLOOD BY AUTOMATED COUNT: 13.5 % (ref 11.8–14.4)
GFR, ESTIMATED: 70 ML/MIN/1.73M2
GLUCOSE SERPL-MCNC: 97 MG/DL (ref 74–99)
HCT VFR BLD AUTO: 49.7 % (ref 36.3–47.1)
HGB BLD-MCNC: 15.8 G/DL (ref 11.9–15.1)
IMM GRANULOCYTES # BLD AUTO: <0.03 K/UL (ref 0–0.3)
IMM GRANULOCYTES NFR BLD: 0 %
LYMPHOCYTES NFR BLD: 1.79 K/UL (ref 1.1–3.7)
LYMPHOCYTES RELATIVE PERCENT: 30 % (ref 24–43)
MCH RBC QN AUTO: 28.6 PG (ref 25.2–33.5)
MCHC RBC AUTO-ENTMCNC: 31.8 G/DL (ref 28.4–34.8)
MCV RBC AUTO: 89.9 FL (ref 82.6–102.9)
MONOCYTES NFR BLD: 0.58 K/UL (ref 0.1–1.2)
MONOCYTES NFR BLD: 10 % (ref 3–12)
NEUTROPHILS NFR BLD: 57 % (ref 36–65)
NEUTS SEG NFR BLD: 3.55 K/UL (ref 1.5–8.1)
NRBC BLD-RTO: 0 PER 100 WBC
PLATELET # BLD AUTO: 359 K/UL (ref 138–453)
PMV BLD AUTO: 9.8 FL (ref 8.1–13.5)
POTASSIUM SERPL-SCNC: 4.8 MMOL/L (ref 3.7–5.3)
PROT SERPL-MCNC: 7.9 G/DL (ref 6.6–8.7)
RBC # BLD AUTO: 5.53 M/UL (ref 3.95–5.11)
SODIUM SERPL-SCNC: 143 MMOL/L (ref 136–145)
WBC OTHER # BLD: 6.1 K/UL (ref 3.5–11.3)

## 2024-12-30 PROCEDURE — 1090F PRES/ABSN URINE INCON ASSESS: CPT | Performed by: NURSE PRACTITIONER

## 2024-12-30 PROCEDURE — G8484 FLU IMMUNIZE NO ADMIN: HCPCS | Performed by: NURSE PRACTITIONER

## 2024-12-30 PROCEDURE — 3017F COLORECTAL CA SCREEN DOC REV: CPT | Performed by: NURSE PRACTITIONER

## 2024-12-30 PROCEDURE — G8399 PT W/DXA RESULTS DOCUMENT: HCPCS | Performed by: NURSE PRACTITIONER

## 2024-12-30 PROCEDURE — 3074F SYST BP LT 130 MM HG: CPT | Performed by: NURSE PRACTITIONER

## 2024-12-30 PROCEDURE — 1159F MED LIST DOCD IN RCRD: CPT | Performed by: NURSE PRACTITIONER

## 2024-12-30 PROCEDURE — G8417 CALC BMI ABV UP PARAM F/U: HCPCS | Performed by: NURSE PRACTITIONER

## 2024-12-30 PROCEDURE — 1123F ACP DISCUSS/DSCN MKR DOCD: CPT | Performed by: NURSE PRACTITIONER

## 2024-12-30 PROCEDURE — 1160F RVW MEDS BY RX/DR IN RCRD: CPT | Performed by: NURSE PRACTITIONER

## 2024-12-30 PROCEDURE — 3079F DIAST BP 80-89 MM HG: CPT | Performed by: NURSE PRACTITIONER

## 2024-12-30 PROCEDURE — G8427 DOCREV CUR MEDS BY ELIG CLIN: HCPCS | Performed by: NURSE PRACTITIONER

## 2024-12-30 PROCEDURE — 1036F TOBACCO NON-USER: CPT | Performed by: NURSE PRACTITIONER

## 2024-12-30 PROCEDURE — 99214 OFFICE O/P EST MOD 30 MIN: CPT | Performed by: NURSE PRACTITIONER

## 2024-12-30 RX ORDER — ONDANSETRON 4 MG/1
4 TABLET, FILM COATED ORAL EVERY 8 HOURS PRN
Qty: 20 TABLET | Refills: 0 | Status: SHIPPED | OUTPATIENT
Start: 2024-12-30

## 2024-12-30 ASSESSMENT — PATIENT HEALTH QUESTIONNAIRE - PHQ9
SUM OF ALL RESPONSES TO PHQ QUESTIONS 1-9: 0
2. FEELING DOWN, DEPRESSED OR HOPELESS: NOT AT ALL
1. LITTLE INTEREST OR PLEASURE IN DOING THINGS: NOT AT ALL
SUM OF ALL RESPONSES TO PHQ QUESTIONS 1-9: 0
SUM OF ALL RESPONSES TO PHQ QUESTIONS 1-9: 0
SUM OF ALL RESPONSES TO PHQ9 QUESTIONS 1 & 2: 0
SUM OF ALL RESPONSES TO PHQ QUESTIONS 1-9: 0

## 2024-12-30 NOTE — PROGRESS NOTES
APRN - CNP   atorvastatin (LIPITOR) 40 MG tablet Take 1 tablet by mouth daily Yes Andres Franco MD   aspirin (ASPIRIN LOW DOSE) 81 MG EC tablet Take 1 tablet by mouth daily Yes Angela Ely MD   magnesium oxide (MAG-OX) 400 MG tablet Take 1 tablet by mouth daily Yes David Lozada MD   irbesartan (AVAPRO) 300 MG tablet Take 1 tablet by mouth daily Yes Angela Ely MD   vitamin E 180 MG (400 UNIT) CAPS capsule Take 1 capsule by mouth daily Pt to stop 7 days prior to OR Yes David Lozada MD   pantoprazole (PROTONIX) 40 MG tablet Take 1 tablet by mouth daily Yes David Lozada MD   diclofenac sodium (VOLTAREN) 1 % GEL Apply topically daily as needed Yes David Lozada MD   buprenorphine (BELBUCA) 75 MCG FILM buccal film Place 1 Film inside cheek daily. Yes David Lozada MD   vitamin C (ASCORBIC ACID) 500 MG tablet Take 1 tablet by mouth daily Yes David Lozada MD   Metoprolol Succinate 100 MG CS24 Take 1 tablet by mouth daily Yes David Lozada MD   celecoxib (CELEBREX) 200 MG capsule Take 1 capsule by mouth daily Pt to stop 7 days prior to OR Yes ProviderDavid MD   cycloSPORINE (RESTASIS) 0.05 % ophthalmic emulsion Apply 1 drop to eye in the morning and 1 drop in the evening. Yes David Lozada MD   DULoxetine (CYMBALTA) 60 MG extended release capsule Take 1 capsule by mouth daily Yes David Lozada MD   gabapentin (NEURONTIN) 100 MG capsule Take 1 capsule by mouth nightly. Yes David Lozada MD       Allergies   Allergen Reactions    Latex Rash     Developed moderate desquamating erythematous rash from tape on skin at time of right mastectomy 12/2014. (Pr orthopedic office patient has a latex allergy June 2018)    Fluoxetine Other (See Comments)     More depressed    Oxycodone-Acetaminophen Other (See Comments)     Patient unsure    Tape [Adhesive Tape]          Subjective:      Review of Systems  PER HPI    Objective:

## 2025-01-29 ENCOUNTER — OFFICE VISIT (OUTPATIENT)
Dept: PRIMARY CARE CLINIC | Age: 70
End: 2025-01-29
Payer: MEDICAID

## 2025-01-29 VITALS
WEIGHT: 181 LBS | HEART RATE: 92 BPM | SYSTOLIC BLOOD PRESSURE: 122 MMHG | DIASTOLIC BLOOD PRESSURE: 86 MMHG | BODY MASS INDEX: 34.17 KG/M2 | OXYGEN SATURATION: 94 % | HEIGHT: 61 IN

## 2025-01-29 DIAGNOSIS — Z13.6 SCREENING FOR CARDIOVASCULAR CONDITION: ICD-10-CM

## 2025-01-29 DIAGNOSIS — Q21.12 PFO (PATENT FORAMEN OVALE): ICD-10-CM

## 2025-01-29 DIAGNOSIS — Z85.3 HISTORY OF CANCER OF RIGHT BREAST: ICD-10-CM

## 2025-01-29 DIAGNOSIS — M19.90 ARTHRITIS: ICD-10-CM

## 2025-01-29 DIAGNOSIS — E11.9 CONTROLLED TYPE 2 DIABETES MELLITUS WITHOUT COMPLICATION, WITHOUT LONG-TERM CURRENT USE OF INSULIN (HCC): ICD-10-CM

## 2025-01-29 DIAGNOSIS — I10 ESSENTIAL HYPERTENSION: Primary | ICD-10-CM

## 2025-01-29 DIAGNOSIS — I63.9 CEREBROVASCULAR ACCIDENT (CVA), UNSPECIFIED MECHANISM (HCC): ICD-10-CM

## 2025-01-29 PROCEDURE — 3044F HG A1C LEVEL LT 7.0%: CPT | Performed by: NURSE PRACTITIONER

## 2025-01-29 PROCEDURE — 3074F SYST BP LT 130 MM HG: CPT | Performed by: NURSE PRACTITIONER

## 2025-01-29 PROCEDURE — 99214 OFFICE O/P EST MOD 30 MIN: CPT | Performed by: NURSE PRACTITIONER

## 2025-01-29 PROCEDURE — G8417 CALC BMI ABV UP PARAM F/U: HCPCS | Performed by: NURSE PRACTITIONER

## 2025-01-29 PROCEDURE — 3017F COLORECTAL CA SCREEN DOC REV: CPT | Performed by: NURSE PRACTITIONER

## 2025-01-29 PROCEDURE — G8399 PT W/DXA RESULTS DOCUMENT: HCPCS | Performed by: NURSE PRACTITIONER

## 2025-01-29 PROCEDURE — 2022F DILAT RTA XM EVC RTNOPTHY: CPT | Performed by: NURSE PRACTITIONER

## 2025-01-29 PROCEDURE — 1090F PRES/ABSN URINE INCON ASSESS: CPT | Performed by: NURSE PRACTITIONER

## 2025-01-29 PROCEDURE — 1036F TOBACCO NON-USER: CPT | Performed by: NURSE PRACTITIONER

## 2025-01-29 PROCEDURE — 1123F ACP DISCUSS/DSCN MKR DOCD: CPT | Performed by: NURSE PRACTITIONER

## 2025-01-29 PROCEDURE — G8427 DOCREV CUR MEDS BY ELIG CLIN: HCPCS | Performed by: NURSE PRACTITIONER

## 2025-01-29 PROCEDURE — 3079F DIAST BP 80-89 MM HG: CPT | Performed by: NURSE PRACTITIONER

## 2025-01-29 SDOH — ECONOMIC STABILITY: FOOD INSECURITY: WITHIN THE PAST 12 MONTHS, THE FOOD YOU BOUGHT JUST DIDN'T LAST AND YOU DIDN'T HAVE MONEY TO GET MORE.: OFTEN TRUE

## 2025-01-29 SDOH — ECONOMIC STABILITY: FOOD INSECURITY: WITHIN THE PAST 12 MONTHS, YOU WORRIED THAT YOUR FOOD WOULD RUN OUT BEFORE YOU GOT MONEY TO BUY MORE.: OFTEN TRUE

## 2025-01-29 ASSESSMENT — PATIENT HEALTH QUESTIONNAIRE - PHQ9
SUM OF ALL RESPONSES TO PHQ QUESTIONS 1-9: 2
3. TROUBLE FALLING OR STAYING ASLEEP: NOT AT ALL
8. MOVING OR SPEAKING SO SLOWLY THAT OTHER PEOPLE COULD HAVE NOTICED. OR THE OPPOSITE, BEING SO FIGETY OR RESTLESS THAT YOU HAVE BEEN MOVING AROUND A LOT MORE THAN USUAL: NOT AT ALL
SUM OF ALL RESPONSES TO PHQ9 QUESTIONS 1 & 2: 2
4. FEELING TIRED OR HAVING LITTLE ENERGY: NOT AT ALL
5. POOR APPETITE OR OVEREATING: NOT AT ALL
9. THOUGHTS THAT YOU WOULD BE BETTER OFF DEAD, OR OF HURTING YOURSELF: NOT AT ALL
SUM OF ALL RESPONSES TO PHQ QUESTIONS 1-9: 2
6. FEELING BAD ABOUT YOURSELF - OR THAT YOU ARE A FAILURE OR HAVE LET YOURSELF OR YOUR FAMILY DOWN: NOT AT ALL
SUM OF ALL RESPONSES TO PHQ QUESTIONS 1-9: 2
SUM OF ALL RESPONSES TO PHQ QUESTIONS 1-9: 2
10. IF YOU CHECKED OFF ANY PROBLEMS, HOW DIFFICULT HAVE THESE PROBLEMS MADE IT FOR YOU TO DO YOUR WORK, TAKE CARE OF THINGS AT HOME, OR GET ALONG WITH OTHER PEOPLE: NOT DIFFICULT AT ALL
7. TROUBLE CONCENTRATING ON THINGS, SUCH AS READING THE NEWSPAPER OR WATCHING TELEVISION: NOT AT ALL
2. FEELING DOWN, DEPRESSED OR HOPELESS: SEVERAL DAYS
1. LITTLE INTEREST OR PLEASURE IN DOING THINGS: SEVERAL DAYS

## 2025-01-29 NOTE — PROGRESS NOTES
rechecked to monitor her A1c levels.      Return for MEDICARE AW.     Patient given educational materials - see patient instructions.  Discussed use, benefit, and side effects of prescribed medications.  All patient questions answered. Pt voiced understanding. Reviewed health maintenance.  Instructed to continue current medications, diet and exercise.  Patient agreed with treatment plan. Follow up as directed.     Electronicallysigned by VIOLA Olmos CNP on 2/9/2025 at 9:30 AM    The patient (or guardian, if applicable) and other individuals in attendance with the patient were advised that Artificial Intelligence will be utilized during this visit to record, process the conversation to generate a clinical note, and support improvement of the AI technology. The patient (or guardian, if applicable) and other individuals in attendance at the appointment consented to the use of AI, including the recording.

## 2025-01-29 NOTE — PATIENT INSTRUCTIONS
and Thursday 1:30-3pm.  Phone and Address: [phone]; [address] [facility] [address]  [facility] [employer] Mobile Pantry at [facility]  What they offer: Hot meal: Second and fourth Saturday from 12:45-1:15pm; Food pantry: Wednesday 6pm - 7pm (seasonal hours), cleaning supplies every second Wednesday, hygiene items every fourth Wednesday.  Phone and Address: [phone]; [address] [address] [facility] [address]  [facility] [employer]  [employer]  What they offer: Choice food pantry and household items Tuesday through Friday 9am-3pm appointment only (scheduled for the following week)  Phone and Address: Call [phone]; [address] [facility] [address]  [facility] Choice Food Pantry  What they offer: Tuesday and Thursday 10am - 12pm, must register by phone  Phone and Address:  Call to register [phone]; [address] [address]  [facility] [employer]  What they offer: Food pantry: Monday-Friday 9:30-11am, Hot meal: Monday-Thursday 12-1:30pm  Phone Number: [phone]; [address] [facility] [address]  [facility] [place], [place]:  What they offer: Food Pantry (Appointment Only, [facility] residents)  Phone Number: [phone]  [place] [employer] ([employer]):  What they offer: Government programs including Medicaid, SNAP (food stamps), TANF (cash assistance), and childcare assistance.  Phone Number: [phone]      [facility]  What they offer: Food pantry, Monday-Thursday 9am-12pm, [facility] residents with picture ID  Phone and Address: [phone]; [address] [facility] [address]    [facility]  What they offer: Food pantry delivery within [place] boundaries  Phone number: [phone]  [facility] Food Pantry  What they offer: Food pantry, Monday-Thursday 9 AM - 12 PM, [facility]

## 2025-02-04 ENCOUNTER — HOSPITAL ENCOUNTER (OUTPATIENT)
Age: 70
Setting detail: SPECIMEN
Discharge: HOME | End: 2025-02-04

## 2025-02-04 DIAGNOSIS — E11.9 CONTROLLED TYPE 2 DIABETES MELLITUS WITHOUT COMPLICATION, WITHOUT LONG-TERM CURRENT USE OF INSULIN (HCC): ICD-10-CM

## 2025-02-04 DIAGNOSIS — I10 ESSENTIAL HYPERTENSION: ICD-10-CM

## 2025-02-04 LAB
CHOLEST SERPL-MCNC: 212 MG/DL (ref 0–199)
CHOLESTEROL/HDL RATIO: 3.8
EST. AVERAGE GLUCOSE BLD GHB EST-MCNC: 123 MG/DL
HBA1C MFR BLD: 5.9 % (ref 4–6)
HDLC SERPL-MCNC: 56 MG/DL
LDLC SERPL CALC-MCNC: 132 MG/DL (ref 0–100)
TRIGL SERPL-MCNC: 121 MG/DL
VLDLC SERPL CALC-MCNC: 24 MG/DL (ref 1–30)

## 2025-02-05 DIAGNOSIS — E78.00 ELEVATED LDL CHOLESTEROL LEVEL: Primary | ICD-10-CM

## 2025-02-05 DIAGNOSIS — Z86.73 HISTORY OF TRANSIENT ISCHEMIC ATTACK (TIA): ICD-10-CM

## 2025-02-05 RX ORDER — ATORVASTATIN CALCIUM 80 MG/1
80 TABLET, FILM COATED ORAL DAILY
Qty: 90 TABLET | Refills: 1 | Status: SHIPPED | OUTPATIENT
Start: 2025-02-05

## 2025-02-07 ENCOUNTER — OFFICE VISIT (OUTPATIENT)
Dept: PRIMARY CARE CLINIC | Age: 70
End: 2025-02-07
Payer: MEDICARE

## 2025-02-07 VITALS
HEIGHT: 61 IN | DIASTOLIC BLOOD PRESSURE: 84 MMHG | HEART RATE: 93 BPM | SYSTOLIC BLOOD PRESSURE: 122 MMHG | OXYGEN SATURATION: 98 % | RESPIRATION RATE: 16 BRPM | BODY MASS INDEX: 34.48 KG/M2 | WEIGHT: 182.6 LBS

## 2025-02-07 DIAGNOSIS — R73.03 PREDIABETES: ICD-10-CM

## 2025-02-07 DIAGNOSIS — R07.89 CHEST TIGHTNESS: ICD-10-CM

## 2025-02-07 DIAGNOSIS — E78.00 ELEVATED LDL CHOLESTEROL LEVEL: Primary | ICD-10-CM

## 2025-02-07 DIAGNOSIS — Q21.12 PFO (PATENT FORAMEN OVALE): ICD-10-CM

## 2025-02-07 PROBLEM — M16.11 PRIMARY OSTEOARTHRITIS OF RIGHT HIP: Status: ACTIVE | Noted: 2019-12-30

## 2025-02-07 PROBLEM — Z96.641 STATUS POST RIGHT HIP REPLACEMENT: Status: ACTIVE | Noted: 2020-02-10

## 2025-02-07 PROBLEM — M26.629 TMJ SYNDROME: Status: ACTIVE | Noted: 2020-06-23

## 2025-02-07 PROCEDURE — 3074F SYST BP LT 130 MM HG: CPT | Performed by: NURSE PRACTITIONER

## 2025-02-07 PROCEDURE — 1160F RVW MEDS BY RX/DR IN RCRD: CPT | Performed by: NURSE PRACTITIONER

## 2025-02-07 PROCEDURE — 1123F ACP DISCUSS/DSCN MKR DOCD: CPT | Performed by: NURSE PRACTITIONER

## 2025-02-07 PROCEDURE — G8399 PT W/DXA RESULTS DOCUMENT: HCPCS | Performed by: NURSE PRACTITIONER

## 2025-02-07 PROCEDURE — 3079F DIAST BP 80-89 MM HG: CPT | Performed by: NURSE PRACTITIONER

## 2025-02-07 PROCEDURE — 99214 OFFICE O/P EST MOD 30 MIN: CPT | Performed by: NURSE PRACTITIONER

## 2025-02-07 PROCEDURE — 3017F COLORECTAL CA SCREEN DOC REV: CPT | Performed by: NURSE PRACTITIONER

## 2025-02-07 PROCEDURE — G8427 DOCREV CUR MEDS BY ELIG CLIN: HCPCS | Performed by: NURSE PRACTITIONER

## 2025-02-07 PROCEDURE — 1159F MED LIST DOCD IN RCRD: CPT | Performed by: NURSE PRACTITIONER

## 2025-02-07 PROCEDURE — 1036F TOBACCO NON-USER: CPT | Performed by: NURSE PRACTITIONER

## 2025-02-07 PROCEDURE — G8417 CALC BMI ABV UP PARAM F/U: HCPCS | Performed by: NURSE PRACTITIONER

## 2025-02-07 PROCEDURE — 1090F PRES/ABSN URINE INCON ASSESS: CPT | Performed by: NURSE PRACTITIONER

## 2025-02-07 SDOH — ECONOMIC STABILITY: FOOD INSECURITY: WITHIN THE PAST 12 MONTHS, YOU WORRIED THAT YOUR FOOD WOULD RUN OUT BEFORE YOU GOT MONEY TO BUY MORE.: NEVER TRUE

## 2025-02-07 SDOH — ECONOMIC STABILITY: FOOD INSECURITY: WITHIN THE PAST 12 MONTHS, THE FOOD YOU BOUGHT JUST DIDN'T LAST AND YOU DIDN'T HAVE MONEY TO GET MORE.: NEVER TRUE

## 2025-02-07 ASSESSMENT — PATIENT HEALTH QUESTIONNAIRE - PHQ9
SUM OF ALL RESPONSES TO PHQ QUESTIONS 1-9: 2
2. FEELING DOWN, DEPRESSED OR HOPELESS: SEVERAL DAYS
6. FEELING BAD ABOUT YOURSELF - OR THAT YOU ARE A FAILURE OR HAVE LET YOURSELF OR YOUR FAMILY DOWN: NOT AT ALL
1. LITTLE INTEREST OR PLEASURE IN DOING THINGS: SEVERAL DAYS
4. FEELING TIRED OR HAVING LITTLE ENERGY: NOT AT ALL
10. IF YOU CHECKED OFF ANY PROBLEMS, HOW DIFFICULT HAVE THESE PROBLEMS MADE IT FOR YOU TO DO YOUR WORK, TAKE CARE OF THINGS AT HOME, OR GET ALONG WITH OTHER PEOPLE: SOMEWHAT DIFFICULT
SUM OF ALL RESPONSES TO PHQ9 QUESTIONS 1 & 2: 2
SUM OF ALL RESPONSES TO PHQ QUESTIONS 1-9: 2
9. THOUGHTS THAT YOU WOULD BE BETTER OFF DEAD, OR OF HURTING YOURSELF: NOT AT ALL
SUM OF ALL RESPONSES TO PHQ QUESTIONS 1-9: 2
SUM OF ALL RESPONSES TO PHQ QUESTIONS 1-9: 2
5. POOR APPETITE OR OVEREATING: NOT AT ALL
8. MOVING OR SPEAKING SO SLOWLY THAT OTHER PEOPLE COULD HAVE NOTICED. OR THE OPPOSITE, BEING SO FIGETY OR RESTLESS THAT YOU HAVE BEEN MOVING AROUND A LOT MORE THAN USUAL: NOT AT ALL
7. TROUBLE CONCENTRATING ON THINGS, SUCH AS READING THE NEWSPAPER OR WATCHING TELEVISION: NOT AT ALL
3. TROUBLE FALLING OR STAYING ASLEEP: NOT AT ALL

## 2025-02-07 ASSESSMENT — ENCOUNTER SYMPTOMS
NAUSEA: 0
VOMITING: 0
SORE THROAT: 0
CHEST TIGHTNESS: 1
DIARRHEA: 0
ABDOMINAL PAIN: 0
SHORTNESS OF BREATH: 0
COLOR CHANGE: 0
RHINORRHEA: 0

## 2025-02-07 NOTE — PROGRESS NOTES
[facility]  [facility] PRIMARY CARE  [address] [place] [address]   [address]  [facility] [address]  Dept: [phone]  Dept Fax: [phone]    Mami Cunningham is a 69 y.o. female who presentstoday for her medical conditions/complaints as noted below.  Mami Cunningham is c/o of  Chief Complaint   Patient presents with    Discuss Labs     DM         HPI:     History of Present Illness  The patient presents for evaluation of cholesterol and blood glucose management, reviewed recent labs and discussed.  We increased her atorvastatin to 80 mg daily due to elevated LDL and total cholesterol.  She does continue to follow with cardiology, last appointment last month.  She has intermittent midsternal chest pressure but denies any worsening symptoms with physical activity.  Her last stress test was in 2023, we discussed reviewing symptoms with cardiology and additional testing if recommended by them.  There is no acute distress in office today.    Recall, she recently established care in this office.  Has history of hypertension, PFO, TIA. She denies cough, HA, leg swelling or palpitations.  Cardiology currently considering whether PFO needs closure due to recent TIA.         Hemoglobin A1C (%)   Date Value   02/04/2025 5.9   10/03/2022 6.2 (H)             ( goal A1C is < 7)   No components found for: \"LABMICR\"  No components found for: \"LDLCHOLESTEROL\", \"LDLCALC\"    (goal LDL is <100)   AST (U/L)   Date Value   12/30/2024 50 (H)     ALT (U/L)   Date Value   12/30/2024 47 (H)     BUN (mg/dL)   Date Value   12/30/2024 13     BP Readings from Last 3 Encounters:   02/07/25 122/84   01/29/25 122/86   01/29/25 128/88          ([id number])    Past Medical History:   Diagnosis Date    Anxiety     Cancer (HCC) 2014    right breast x 2, salivary gland;  sees Dr Mowat    Cerebral artery occlusion with cerebral infarction (HCC)     Depression     GERD (gastroesophageal reflux disease)     History of CVA

## 2025-03-06 RX ORDER — DULOXETIN HYDROCHLORIDE 60 MG/1
60 CAPSULE, DELAYED RELEASE ORAL DAILY
Qty: 60 CAPSULE | Refills: 1 | Status: SHIPPED | OUTPATIENT
Start: 2025-03-06

## 2025-04-30 ENCOUNTER — OFFICE VISIT (OUTPATIENT)
Dept: PRIMARY CARE CLINIC | Age: 70
End: 2025-04-30
Payer: MEDICARE

## 2025-04-30 ENCOUNTER — HOSPITAL ENCOUNTER (OUTPATIENT)
Age: 70
Setting detail: SPECIMEN
Discharge: HOME | End: 2025-04-30

## 2025-04-30 VITALS
WEIGHT: 177.4 LBS | SYSTOLIC BLOOD PRESSURE: 124 MMHG | HEIGHT: 61 IN | DIASTOLIC BLOOD PRESSURE: 82 MMHG | OXYGEN SATURATION: 98 % | BODY MASS INDEX: 33.49 KG/M2 | HEART RATE: 99 BPM | RESPIRATION RATE: 15 BRPM

## 2025-04-30 DIAGNOSIS — Z98.890 HISTORY OF SURGERY ON RIGHT WRIST: ICD-10-CM

## 2025-04-30 DIAGNOSIS — M25.531 RIGHT WRIST PAIN: ICD-10-CM

## 2025-04-30 DIAGNOSIS — R10.13 EPIGASTRIC PAIN: ICD-10-CM

## 2025-04-30 DIAGNOSIS — Z00.00 ENCOUNTER FOR SUBSEQUENT ANNUAL WELLNESS VISIT (AWV) IN MEDICARE PATIENT: ICD-10-CM

## 2025-04-30 DIAGNOSIS — N39.45 CONTINUOUS LEAKAGE OF URINE: ICD-10-CM

## 2025-04-30 DIAGNOSIS — Z00.00 WELCOME TO MEDICARE PREVENTIVE VISIT: Primary | ICD-10-CM

## 2025-04-30 LAB
BILIRUBIN, POC: NORMAL
BLOOD URINE, POC: NORMAL
CLARITY, POC: CLEAR
COLOR, POC: YELLOW
GLUCOSE URINE, POC: NORMAL MG/DL
KETONES, POC: NORMAL MG/DL
LEUKOCYTE EST, POC: NORMAL
NITRITE, POC: NORMAL
PH, POC: 5.5
PROTEIN, POC: NORMAL MG/DL
SPECIFIC GRAVITY, POC: 1.03
UROBILINOGEN, POC: 0.2 MG/DL

## 2025-04-30 PROCEDURE — 1123F ACP DISCUSS/DSCN MKR DOCD: CPT | Performed by: NURSE PRACTITIONER

## 2025-04-30 PROCEDURE — G0402 INITIAL PREVENTIVE EXAM: HCPCS | Performed by: NURSE PRACTITIONER

## 2025-04-30 PROCEDURE — 3017F COLORECTAL CA SCREEN DOC REV: CPT | Performed by: NURSE PRACTITIONER

## 2025-04-30 PROCEDURE — 3079F DIAST BP 80-89 MM HG: CPT | Performed by: NURSE PRACTITIONER

## 2025-04-30 PROCEDURE — 1160F RVW MEDS BY RX/DR IN RCRD: CPT | Performed by: NURSE PRACTITIONER

## 2025-04-30 PROCEDURE — 1159F MED LIST DOCD IN RCRD: CPT | Performed by: NURSE PRACTITIONER

## 2025-04-30 PROCEDURE — 81003 URINALYSIS AUTO W/O SCOPE: CPT | Performed by: NURSE PRACTITIONER

## 2025-04-30 PROCEDURE — 3074F SYST BP LT 130 MM HG: CPT | Performed by: NURSE PRACTITIONER

## 2025-04-30 RX ORDER — BUPRENORPHINE 7.5 UG/H
PATCH TRANSDERMAL
COMMUNITY
Start: 2025-04-28

## 2025-04-30 ASSESSMENT — PATIENT HEALTH QUESTIONNAIRE - PHQ9
SUM OF ALL RESPONSES TO PHQ QUESTIONS 1-9: 0
SUM OF ALL RESPONSES TO PHQ QUESTIONS 1-9: 0
6. FEELING BAD ABOUT YOURSELF - OR THAT YOU ARE A FAILURE OR HAVE LET YOURSELF OR YOUR FAMILY DOWN: NOT AT ALL
10. IF YOU CHECKED OFF ANY PROBLEMS, HOW DIFFICULT HAVE THESE PROBLEMS MADE IT FOR YOU TO DO YOUR WORK, TAKE CARE OF THINGS AT HOME, OR GET ALONG WITH OTHER PEOPLE: NOT DIFFICULT AT ALL
1. LITTLE INTEREST OR PLEASURE IN DOING THINGS: NOT AT ALL
SUM OF ALL RESPONSES TO PHQ QUESTIONS 1-9: 0
5. POOR APPETITE OR OVEREATING: NOT AT ALL
4. FEELING TIRED OR HAVING LITTLE ENERGY: NOT AT ALL
7. TROUBLE CONCENTRATING ON THINGS, SUCH AS READING THE NEWSPAPER OR WATCHING TELEVISION: NOT AT ALL
SUM OF ALL RESPONSES TO PHQ QUESTIONS 1-9: 0
2. FEELING DOWN, DEPRESSED OR HOPELESS: NOT AT ALL
9. THOUGHTS THAT YOU WOULD BE BETTER OFF DEAD, OR OF HURTING YOURSELF: NOT AT ALL
8. MOVING OR SPEAKING SO SLOWLY THAT OTHER PEOPLE COULD HAVE NOTICED. OR THE OPPOSITE, BEING SO FIGETY OR RESTLESS THAT YOU HAVE BEEN MOVING AROUND A LOT MORE THAN USUAL: NOT AT ALL
3. TROUBLE FALLING OR STAYING ASLEEP: NOT AT ALL

## 2025-04-30 NOTE — PATIENT INSTRUCTIONS
home?  Keeping your body active can help slow MCI. Exercises like walking can help. Try to stay active mentally too. Read or do things like crossword puzzles if you enjoy doing them.  If you need help coping with MCI, you may want to get support from family, friends, a support group, or a counselor who works with people who have MCI.  Though the future isn't always clear, it can be good to plan ahead with instructions for your care. These are called advanced directives. Having a plan can help make sure that you get the care you want.  Current as of: December 3, 2024  Content Version: 14.4  © 2024-2025 [employer].   Care instructions adapted under license by [employer]. If you have questions about a medical condition or this instruction, always ask your healthcare professional. [employer] disclaims any warranty or liability for your use of this information.         Learning About Stress  What is stress?     Stress is your body's response to a hard situation. Your body can have a physical, emotional, or mental response. Stress is a fact of life for most people, and it affects everyone differently. What causes stress for you may not be stressful for someone else.  A lot of things can cause stress. You may feel stress when you go on a job interview, take a test, or run a race. This kind of short-term stress is normal and even useful. It can help you if you need to work hard or react quickly. For example, stress can help you finish an important job on time.  Long-term stress is caused by ongoing stressful situations or events. Examples of long-term stress include long-term health problems, ongoing problems at work, or conflicts in your family. Long-term stress can harm your health.  How does stress affect your health?  When you are stressed, your body responds as though you are in danger. It makes hormones that speed up your heart, make you breathe faster, and give you a burst of energy. This

## 2025-04-30 NOTE — PROGRESS NOTES
Medicare Annual Wellness Visit    Mami Cunningham is here for Medicare AWV    Assessment & Plan   Encounter for subsequent annual wellness visit (AWV) in Medicare patient  Epigastric pain  Follows with GI, EGD planned. She reports colonoscopy UTD, will review. Encouraged to discuss bowel concerns with GI  Right wrist pain  History of surgery on right wrist  Continued pain after surgery to remove nodule. New referral given   -     Keven Hubbard MD, Orthopaedic Surgery, [place]  Continuous leakage of urine  UA collected, await culture. Urology referral given for further eval. Declines medication today for bladder spasms, frequency   -     AFL - Tin Humphries MD, Urology, [place]  -     POCT Urinalysis No Micro (Auto)  -     Culture, Urine; Future       No follow-ups on file.     Subjective     History of Present Illness  The patient presents for an annual wellness visit.    She reports experiencing intermittent abdominal pain, which is described as severe at present. Accompanying symptoms include dizziness, nausea, and a lack of appetite. A weight loss of approximately 5 pounds over the past 1 to 2 months is noted. Despite these symptoms, an active lifestyle is maintained, primarily through walking. No recent falls or difficulties with daily activities such as showering or bathing are reported.    An esophagogastroduodenoscopy (EGD) is scheduled for tomorrow. The patient is uncertain if a colonoscopy will also be performed. Instructions to fast after midnight have been provided.     She has not yet established a living will but expresses a desire to do so, with her son assisting in this process. End-of-life wishes have been communicated to her son, including a preference for burial in [place].    She c/o urinary incontinence, asking to see urology. Does not want to try medications. UA today shows leuks, will await culture. She also reports loose watery drainage from rectum but denies stool or

## 2025-05-01 LAB
MICROORGANISM SPEC CULT: NO GROWTH
SERVICE CMNT-IMP: NORMAL
SPECIMEN DESCRIPTION: NORMAL

## 2025-05-02 ENCOUNTER — RESULTS FOLLOW-UP (OUTPATIENT)
Dept: PRIMARY CARE CLINIC | Age: 70
End: 2025-05-02

## 2025-05-05 ENCOUNTER — OFFICE VISIT (OUTPATIENT)
Dept: ORTHOPEDIC SURGERY | Age: 70
End: 2025-05-05
Payer: MEDICARE

## 2025-05-05 VITALS — BODY MASS INDEX: 33.42 KG/M2 | RESPIRATION RATE: 14 BRPM | HEIGHT: 61 IN | WEIGHT: 177 LBS

## 2025-05-05 DIAGNOSIS — Z98.890 S/P HARDWARE REMOVAL: Primary | ICD-10-CM

## 2025-05-05 DIAGNOSIS — M25.552 LEFT HIP PAIN: ICD-10-CM

## 2025-05-05 DIAGNOSIS — M19.031 ARTHRITIS OF WRIST, RIGHT: ICD-10-CM

## 2025-05-05 DIAGNOSIS — M70.62 TROCHANTERIC BURSITIS OF LEFT HIP: ICD-10-CM

## 2025-05-05 PROCEDURE — 1036F TOBACCO NON-USER: CPT | Performed by: PHYSICIAN ASSISTANT

## 2025-05-05 PROCEDURE — 1123F ACP DISCUSS/DSCN MKR DOCD: CPT | Performed by: PHYSICIAN ASSISTANT

## 2025-05-05 PROCEDURE — G8427 DOCREV CUR MEDS BY ELIG CLIN: HCPCS | Performed by: PHYSICIAN ASSISTANT

## 2025-05-05 PROCEDURE — 1159F MED LIST DOCD IN RCRD: CPT | Performed by: PHYSICIAN ASSISTANT

## 2025-05-05 PROCEDURE — G8399 PT W/DXA RESULTS DOCUMENT: HCPCS | Performed by: PHYSICIAN ASSISTANT

## 2025-05-05 PROCEDURE — G8417 CALC BMI ABV UP PARAM F/U: HCPCS | Performed by: PHYSICIAN ASSISTANT

## 2025-05-05 PROCEDURE — 1090F PRES/ABSN URINE INCON ASSESS: CPT | Performed by: PHYSICIAN ASSISTANT

## 2025-05-05 PROCEDURE — 1125F AMNT PAIN NOTED PAIN PRSNT: CPT | Performed by: PHYSICIAN ASSISTANT

## 2025-05-05 PROCEDURE — 3017F COLORECTAL CA SCREEN DOC REV: CPT | Performed by: PHYSICIAN ASSISTANT

## 2025-05-05 PROCEDURE — 99215 OFFICE O/P EST HI 40 MIN: CPT | Performed by: PHYSICIAN ASSISTANT

## 2025-05-05 PROCEDURE — 20610 DRAIN/INJ JOINT/BURSA W/O US: CPT | Performed by: PHYSICIAN ASSISTANT

## 2025-05-05 RX ORDER — BUPIVACAINE HYDROCHLORIDE 2.5 MG/ML
2 INJECTION, SOLUTION INFILTRATION; PERINEURAL ONCE
Status: COMPLETED | OUTPATIENT
Start: 2025-05-05 | End: 2025-05-05

## 2025-05-05 RX ORDER — BETAMETHASONE SODIUM PHOSPHATE AND BETAMETHASONE ACETATE 3; 3 MG/ML; MG/ML
12 INJECTION, SUSPENSION INTRA-ARTICULAR; INTRALESIONAL; INTRAMUSCULAR; SOFT TISSUE ONCE
Status: COMPLETED | OUTPATIENT
Start: 2025-05-05 | End: 2025-05-05

## 2025-05-05 RX ADMIN — BETAMETHASONE SODIUM PHOSPHATE AND BETAMETHASONE ACETATE 12 MG: 3; 3 INJECTION, SUSPENSION INTRA-ARTICULAR; INTRALESIONAL; INTRAMUSCULAR; SOFT TISSUE at 13:08

## 2025-05-05 RX ADMIN — BUPIVACAINE HYDROCHLORIDE 5 MG: 2.5 INJECTION, SOLUTION INFILTRATION; PERINEURAL at 13:09

## 2025-05-05 ASSESSMENT — ENCOUNTER SYMPTOMS
SHORTNESS OF BREATH: 0
VOMITING: 0
COUGH: 0
COLOR CHANGE: 0

## 2025-05-05 NOTE — PROGRESS NOTES
[facility] ORTHOPEDICS AND SPORTS MEDICINE  [address]  [address] [place], [place] [id number]  Dept: [phone]  Dept Fax: [phone]        Ambulatory Follow Up      Subjective:   Mami Cunningham is a 69 y.o. year old female who presents to our office today for routine followup regarding her   1. S/P hardware removal    2. Arthritis of wrist, right    3. Left hip pain    4. Trochanteric bursitis of left hip    .    Chief Complaint   Patient presents with    Follow-up     Right distal radius deep implant removal. DOS 12/21/23       Date of Surgery: 12/21/2023 - Dr Velásquez    History of Present Illness  The patient is a 69-year-old female who presents today for continued right wrist pain. She has a history of right distal radius deep implant removal on 12/21/2023 with Dr. Velásquez. She was last seen on 11/06/2024 at our [facility] at [facility], and at that time, an MRI of the wrist was ordered due to her history of breast cancer and squamous cell cancer of the face. She has a history of right mastectomy by Dr. Jiménez and reconstruction with dorsi flap by Dr. Romano in 01/2015. She also reports being cancer-free for 5 years.    She reports persistent pain in her right wrist, specifically at the base of the thumb, which has been ongoing since the removal of hardware by Dr. Velásquez. She has not engaged in any physical therapy for her wrist. She used a wrist brace for approximately 3 weeks post-surgery but has not utilized it recently. She occasionally applies ice to the area, which provides temporary relief.    Additionally, she reports pain in her left hip during ambulation, a symptom that has been progressively worsening over the past 3 years. She underwent a right hip replacement in [place] 4 to 5 years ago.    PAST SURGICAL HISTORY:  - Right distal radius deep implant removal on 12/21/2023  - Right mastectomy

## 2025-05-12 ENCOUNTER — OFFICE VISIT (OUTPATIENT)
Dept: FAMILY MEDICINE CLINIC | Age: 70
End: 2025-05-12
Payer: MEDICARE

## 2025-05-12 VITALS
TEMPERATURE: 98 F | HEART RATE: 89 BPM | BODY MASS INDEX: 32.66 KG/M2 | DIASTOLIC BLOOD PRESSURE: 80 MMHG | SYSTOLIC BLOOD PRESSURE: 124 MMHG | WEIGHT: 173 LBS | OXYGEN SATURATION: 99 % | HEIGHT: 61 IN

## 2025-05-12 DIAGNOSIS — H92.01 RIGHT EAR PAIN: Primary | ICD-10-CM

## 2025-05-12 PROCEDURE — G8417 CALC BMI ABV UP PARAM F/U: HCPCS | Performed by: NURSE PRACTITIONER

## 2025-05-12 PROCEDURE — 1090F PRES/ABSN URINE INCON ASSESS: CPT | Performed by: NURSE PRACTITIONER

## 2025-05-12 PROCEDURE — 1160F RVW MEDS BY RX/DR IN RCRD: CPT | Performed by: NURSE PRACTITIONER

## 2025-05-12 PROCEDURE — 1123F ACP DISCUSS/DSCN MKR DOCD: CPT | Performed by: NURSE PRACTITIONER

## 2025-05-12 PROCEDURE — 1159F MED LIST DOCD IN RCRD: CPT | Performed by: NURSE PRACTITIONER

## 2025-05-12 PROCEDURE — G8427 DOCREV CUR MEDS BY ELIG CLIN: HCPCS | Performed by: NURSE PRACTITIONER

## 2025-05-12 PROCEDURE — 3079F DIAST BP 80-89 MM HG: CPT | Performed by: NURSE PRACTITIONER

## 2025-05-12 PROCEDURE — 1036F TOBACCO NON-USER: CPT | Performed by: NURSE PRACTITIONER

## 2025-05-12 PROCEDURE — G8399 PT W/DXA RESULTS DOCUMENT: HCPCS | Performed by: NURSE PRACTITIONER

## 2025-05-12 PROCEDURE — 3074F SYST BP LT 130 MM HG: CPT | Performed by: NURSE PRACTITIONER

## 2025-05-12 PROCEDURE — 99213 OFFICE O/P EST LOW 20 MIN: CPT | Performed by: NURSE PRACTITIONER

## 2025-05-12 PROCEDURE — 3017F COLORECTAL CA SCREEN DOC REV: CPT | Performed by: NURSE PRACTITIONER

## 2025-05-12 RX ORDER — ACETAMINOPHEN 325 MG/1
650 TABLET ORAL EVERY 6 HOURS PRN
Qty: 40 TABLET | Refills: 0 | Status: SHIPPED | OUTPATIENT
Start: 2025-05-12 | End: 2025-05-17

## 2025-05-12 RX ORDER — CYCLOSPORINE 0.5 MG/ML
1 EMULSION OPHTHALMIC
COMMUNITY

## 2025-05-12 ASSESSMENT — ENCOUNTER SYMPTOMS
COUGH: 0
SORE THROAT: 0
RHINORRHEA: 0

## 2025-05-12 NOTE — PROGRESS NOTES
[facility] PHYSICIANS [facility], [facility] WALK-IN  [address]  [facility] [address]  Dept: [phone]     Mami Cunningham is a 69 y.o. female Established patient, who presents to the walk-in clinic today with conditions/complaints as noted below:    Chief Complaint   Patient presents with    Ear Pain     X 2 days, right ear          HPI:     Ear Pain   There is pain in the right ear. This is a new problem. The current episode started in the past 7 days. The problem occurs constantly. The problem has been gradually improving. There has been no fever. Pertinent negatives include no coughing, ear discharge, headaches, rhinorrhea or sore throat. She has tried nothing for the symptoms.       Past Medical History:   Diagnosis Date    Anxiety     Cancer (HCC) 2014    right breast x 2, salivary gland;  sees Dr Mowat    Cerebral artery occlusion with cerebral infarction (HCC)     Depression     GERD (gastroesophageal reflux disease)     History of CVA (cerebrovascular accident) 2020    pt reports no residual effects    Hyperlipidemia     Hypertension     Neoplasm of parotid gland     Osteoarthritis     Osteopenia     Peripheral vascular disease     PFO (patent foramen ovale) 2023    on echo    PONV (postoperative nausea and vomiting)     Poor historian     Primary cancer of parotid gland (HCC)     Shortness of breath     cannot climb 1 flight of stairs without becoming SOB, can do her own housework but she needs to rest during    Snores     Under care of team     Dr Ely, [facility]    Urinary frequency     Wellness examination     Dr Richar Gomez; pt states had appt \"about\" Oct 2023       Current Outpatient Medications   Medication Sig Dispense Refill    acetaminophen (TYLENOL) 325 MG tablet Take 2 tablets by mouth every 6 hours as needed for Pain 40 tablet 0    diclofenac sodium (VOLTAREN) 1 % GEL Apply 2 g topically 4 times daily 150 g 1    buprenorphine

## 2025-06-02 ENCOUNTER — HOSPITAL ENCOUNTER (OUTPATIENT)
Dept: OCCUPATIONAL THERAPY | Facility: CLINIC | Age: 70
Setting detail: THERAPIES SERIES
Discharge: HOME | End: 2025-06-02
Payer: MEDICARE

## 2025-06-02 PROCEDURE — 97165 OT EVAL LOW COMPLEX 30 MIN: CPT

## 2025-06-02 PROCEDURE — 97110 THERAPEUTIC EXERCISES: CPT

## 2025-06-02 NOTE — CONSULTS
(HCC)     right breast x 2, salivary gland;  sees Dr Mowat    Cerebral artery occlusion with cerebral infarction (HCC)     Depression     GERD (gastroesophageal reflux disease)     History of CVA (cerebrovascular accident)     pt reports no residual effects    Hyperlipidemia     Hypertension     Neoplasm of parotid gland     Osteoarthritis     Osteopenia     Peripheral vascular disease     PFO (patent foramen ovale)     on echo    PONV (postoperative nausea and vomiting)     Poor historian     Primary cancer of parotid gland (HCC)     Shortness of breath     cannot climb 1 flight of stairs without becoming SOB, can do her own housework but she needs to rest during    Snores     Under care of team     Dr Ely, [facility]    Urinary frequency     Wellness examination     Dr Richar Gomez; pt states had appt \"about\" Oct 2023     Past Surgical History:   Procedure Laterality Date    BREAST RECONSTRUCTION Right 2014    BREAST SURGERY Right 2006    breast lumpectomy with chemo and radiation    BUNIONECTOMY Left 2015    BUNIONECTOMY Right 10/10/2016    right foot bunionectomy     SECTION      X 1    COLONOSCOPY N/A 2020    COLONOSCOPY POLYPECTOMY SNARE/COLD BIOPSY performed by Chapin Ngo MD at [facility] OR    EYE SURGERY Bilateral     cataract extraction    FOOT SURGERY Bilateral     FOREARM SURGERY Right 2023    WRIST HARDWARE REMOVAL RIGHT performed by Imer Velásquez DO at [facility] OR    LIPOMA RESECTION Right     benign mass removed behind right ear    MASTECTOMY Right 2014    skin sparing with sentinel node biopsy and reconstruction with tissue expander implant    NOSE SURGERY      FL ARTHRP KNE CONDYLE&PLATU MEDIAL&LAT COMPARTMENTS Left 2018    ROBOTIC LEFT KNEE TOTAL ARTHROPLASTY performed by Sascha Parra MD at [facility] OR    SALIVARY GLAND SURGERY      for cancer    TOTAL HIP ARTHROPLASTY Right     in [place]    UPPER GASTROINTESTINAL ENDOSCOPY N/A 2020

## 2025-06-06 RX ORDER — DULOXETIN HYDROCHLORIDE 60 MG/1
60 CAPSULE, DELAYED RELEASE ORAL DAILY
Qty: 60 CAPSULE | Refills: 1 | Status: SHIPPED | OUTPATIENT
Start: 2025-06-06

## 2025-06-11 ENCOUNTER — APPOINTMENT (OUTPATIENT)
Dept: OCCUPATIONAL THERAPY | Facility: CLINIC | Age: 70
End: 2025-06-11
Payer: MEDICARE

## 2025-06-18 ENCOUNTER — HOSPITAL ENCOUNTER (OUTPATIENT)
Dept: OCCUPATIONAL THERAPY | Facility: CLINIC | Age: 70
Setting detail: THERAPIES SERIES
Discharge: HOME | End: 2025-06-18
Payer: MEDICARE

## 2025-06-18 PROCEDURE — 97110 THERAPEUTIC EXERCISES: CPT

## 2025-06-18 NOTE — FLOWSHEET NOTE
[] [facility]       Occupational Therapy            1st floor       [address] [place], [place]         Phone: [phone]       Fax: [phone] [x] [facility]   [facility]  [address]   [place], [place]  Phone: [phone]  Fax: [phone] [] [facility]  Outpatient Rehabilitation &  Therapy  [address]   P: [phone]  F: [phone]     Occupational Therapy Daily Treatment Note    Date:  2025  Patient Name:  Mami Cunningham    :  1955  MRN: [id number]  Referring Provider:  Yanique Barnes PA-C   Insurance: [facility] MEDICARE DUAL ([place] D-SNP) - THIS IS A DUAL D-SNP PLAN NO PRIOR AUTH REQUIRED, BASED ON MEDICAL NECESSITY PER GABINO YR, FOLLOWS MEDICARE GUIDELINES, DUAL COVERAGE [facility] DUAL MEDICAID COVERS AS COINSURANCE TO PRIMARY   Medical Diagnosis: Z98.890 (ICD-10-CM) - S/P hardware removal, M19.031 (ICD-10-CM) - Arthritis of wrist, right   Rehab Codes: pain in wrist M25.53, and muscle wasting of hand M62.54,   Onset Date: 16                 Next  Appt: 25  Visit# / total visits: 2/8; Progress note for Medicare patient due at visit 8    Cancels/No Shows: 0/0      Subjective:    Pain:  [] Yes  [x] No Location:  N/A Pain Rating: (0-10 scale) 0/10  Pain altered Tx:  [x] No  [] Yes  Action:  Pt Comments: Pt reports she is doing well. No complaints     Objective:  Modalities:  Exercises:  Exercise Reps/Time Weight/Level Comments Completed   Wrist AROM 3x10 AROM Flex/ext  Radial/ulnar dev -   Putty gripping 3 mins red  X   Wrist strengthening  3x10 Orange  Flex/ext -   Power web 3x10 yellow   X    pronation/supination 2x10 2lbs   X   Digi-flex 3x10 red   X   Metal gripper 3x10 35lbs  X   Flexbar  Wrist pronation  Wrist supination 3x10 yellow  X   Resistive clips 6 each color Yellow-black  X   Pronated ball curl 3x10 1kg  X   Vertical dumbbell rotation  2x5 2lbs  X   [place]  Whole container  W/ tennis ball X

## 2025-06-19 ENCOUNTER — OFFICE VISIT (OUTPATIENT)
Dept: ORTHOPEDIC SURGERY | Age: 70
End: 2025-06-19

## 2025-06-19 VITALS — BODY MASS INDEX: 33.42 KG/M2 | HEIGHT: 61 IN | WEIGHT: 177 LBS

## 2025-06-19 DIAGNOSIS — M19.031 ARTHRITIS OF WRIST, RIGHT: ICD-10-CM

## 2025-06-19 DIAGNOSIS — M18.11 PRIMARY OSTEOARTHRITIS OF FIRST CARPOMETACARPAL JOINT OF RIGHT HAND: Primary | ICD-10-CM

## 2025-06-19 RX ORDER — LIDOCAINE HYDROCHLORIDE 10 MG/ML
0.5 INJECTION, SOLUTION INFILTRATION; PERINEURAL ONCE
Status: COMPLETED | OUTPATIENT
Start: 2025-06-19 | End: 2025-06-19

## 2025-06-19 RX ORDER — BETAMETHASONE SODIUM PHOSPHATE AND BETAMETHASONE ACETATE 3; 3 MG/ML; MG/ML
6 INJECTION, SUSPENSION INTRA-ARTICULAR; INTRALESIONAL; INTRAMUSCULAR; SOFT TISSUE ONCE
Status: COMPLETED | OUTPATIENT
Start: 2025-06-19 | End: 2025-06-19

## 2025-06-19 RX ORDER — BUPIVACAINE HYDROCHLORIDE 2.5 MG/ML
0.5 INJECTION, SOLUTION INFILTRATION; PERINEURAL ONCE
Status: COMPLETED | OUTPATIENT
Start: 2025-06-19 | End: 2025-06-19

## 2025-06-19 RX ADMIN — BUPIVACAINE HYDROCHLORIDE 1.25 MG: 2.5 INJECTION, SOLUTION INFILTRATION; PERINEURAL at 13:24

## 2025-06-19 RX ADMIN — BETAMETHASONE SODIUM PHOSPHATE AND BETAMETHASONE ACETATE 6 MG: 3; 3 INJECTION, SUSPENSION INTRA-ARTICULAR; INTRALESIONAL; INTRAMUSCULAR; SOFT TISSUE at 13:34

## 2025-06-19 RX ADMIN — LIDOCAINE HYDROCHLORIDE 0.5 ML: 10 INJECTION, SOLUTION INFILTRATION; PERINEURAL at 13:33

## 2025-06-19 ASSESSMENT — ENCOUNTER SYMPTOMS
SHORTNESS OF BREATH: 0
VOMITING: 0
COLOR CHANGE: 0
COUGH: 0

## 2025-06-19 NOTE — PROGRESS NOTES
procedure well without post procedure complications.      Assessment:      1. Primary osteoarthritis of first carpometacarpal joint of right hand    2. Arthritis of wrist, right       Plan:     Assessment & Plan  1. Right wrist pain due to osteoarthritis:  Patient has been going to occupational therapy for her right wrist arthritis but does have some continued pain within the right thumb CMC joint.  We did discuss continued treatment options in the form of activity modification, bracing, anti-inflammatories, injections, continued occupational therapy and/or surgical intervention.    Patient would like to proceed with a right thumb CMC joint corticosteroid injection.  The patient was given the injections today in office and she tolerated the procedure without complication.  We did discuss that this injection location can cause a few days of increased pain prior to improvement.  Patient did note her understanding.    If there is no improvement in the wrist condition following occupational therapy, a referral to a wrist and hand specialist will be considered.    She is amenable to the above plan.  All questions were answered.  Patient is to follow-up after completion of occupational therapy for the right wrist/hand.         Follow up: Return if symptoms worsen or fail to improve for right wrist, for establish NP appointment with Leon Snyder PA-C for right shoulder pain.    Total Time: 30 min, this does not include time spent on injection/procedure.        Orders Placed This Encounter   Medications    lidocaine 1 % injection 0.5 mL    BUPivacaine (MARCAINE) 0.25 % injection 1.25 mg    betamethasone acetate-betamethasone sodium phosphate (CELESTONE) injection 6 mg       Orders Placed This Encounter   Procedures    20600 - DRAIN/INJECT SMALL JOINT/BURSA       The patient (or guardian, if applicable) and other individuals in attendance with the patient were advised that Artificial Intelligence will be utilized during this

## 2025-06-25 ENCOUNTER — APPOINTMENT (OUTPATIENT)
Dept: OCCUPATIONAL THERAPY | Facility: CLINIC | Age: 70
End: 2025-06-25
Payer: MEDICARE

## 2025-06-25 ENCOUNTER — OFFICE VISIT (OUTPATIENT)
Dept: ORTHOPEDIC SURGERY | Age: 70
End: 2025-06-25

## 2025-06-25 VITALS — WEIGHT: 173 LBS | BODY MASS INDEX: 33.96 KG/M2 | RESPIRATION RATE: 14 BRPM | HEIGHT: 60 IN

## 2025-06-25 DIAGNOSIS — M25.511 RIGHT SHOULDER PAIN, UNSPECIFIED CHRONICITY: ICD-10-CM

## 2025-06-25 DIAGNOSIS — M75.41 ROTATOR CUFF IMPINGEMENT SYNDROME OF RIGHT SHOULDER: Primary | ICD-10-CM

## 2025-06-25 RX ORDER — LIDOCAINE HYDROCHLORIDE 10 MG/ML
3 INJECTION, SOLUTION INFILTRATION; PERINEURAL ONCE
Status: COMPLETED | OUTPATIENT
Start: 2025-06-25 | End: 2025-06-25

## 2025-06-25 RX ORDER — TRIAMCINOLONE ACETONIDE 40 MG/ML
40 INJECTION, SUSPENSION INTRA-ARTICULAR; INTRAMUSCULAR ONCE
Status: COMPLETED | OUTPATIENT
Start: 2025-06-25 | End: 2025-06-25

## 2025-06-25 RX ADMIN — TRIAMCINOLONE ACETONIDE 40 MG: 40 INJECTION, SUSPENSION INTRA-ARTICULAR; INTRAMUSCULAR at 15:49

## 2025-06-25 RX ADMIN — LIDOCAINE HYDROCHLORIDE 3 ML: 10 INJECTION, SOLUTION INFILTRATION; PERINEURAL at 15:47

## 2025-06-25 NOTE — PROGRESS NOTES
Orthopedic Shoulder Encounter Note - New Patient    Chief complaint: Right shoulder pain    HPI: Mami Cunningham is a 69 y.o.  right-hand dominant female who presents for evaluation of her right shoulder.  Patient states has been dealing with pain into the right shoulder for a number of years now.  States she noticed this onset after a breast enhancement surgery but that it was a number of years ago.  States that the pain is mostly over the anterior aspect of the shoulder but does radiate down the arm not passing the elbow.  Denies any numbness or tingling.  States she does feel weak in the right arm and states it is worse when laying directly on the shoulder or with certain activities.    Previous treatment:    NSAIDs: None    Physical Therapy: None    Injections: No    Surgeries: None    Review of Systems:   Constitutional: Negative for fever, chills, sweats.   Pain Score:   4  Musculoskeletal: As noted in HPI     Past Medical History  Mami  has a past medical history of Anxiety, Cancer (HCC), Cerebral artery occlusion with cerebral infarction (HCC), Depression, GERD (gastroesophageal reflux disease), History of CVA (cerebrovascular accident), Hyperlipidemia, Hypertension, Neoplasm of parotid gland, Osteoarthritis, Osteopenia, Peripheral vascular disease, PFO (patent foramen ovale), PONV (postoperative nausea and vomiting), Poor historian, Primary cancer of parotid gland (HCC), Shortness of breath, Snores, Under care of team, Urinary frequency, and Wellness examination.    Past Surgical History  Mami  has a past surgical history that includes Nose surgery; Varicose vein surgery (Bilateral); Foot surgery (Bilateral);  section; Mastectomy (Right, 2014); Bunionectomy (Left, 2015); lipoma resection (Right); Wrist fracture surgery (Right, 2016); Bunionectomy (Right, 10/10/2016); Salivary gland surgery; Breast surgery (Right, ); Breast reconstruction (Right, 2014); eye surgery

## 2025-07-22 ENCOUNTER — HOSPITAL ENCOUNTER (OUTPATIENT)
Dept: PHYSICAL THERAPY | Facility: CLINIC | Age: 70
Setting detail: THERAPIES SERIES
Discharge: HOME | End: 2025-07-22
Payer: MEDICARE

## 2025-07-22 PROCEDURE — 97110 THERAPEUTIC EXERCISES: CPT

## 2025-07-22 PROCEDURE — 97162 PT EVAL MOD COMPLEX 30 MIN: CPT

## 2025-07-22 NOTE — CONSULTS
[] [facility]  Outpatient Rehabilitation &  Therapy  [address]  [phone]  [phone] [] [facility]  Outpatient Rehabilitation &  Therapy  [address]  P: [phone]  F: [phone] [x] [facility]  Outpatient Rehabilitation &  Therapy  [address]  P: [phone]  F: [phone] [] [facility]  Outpatient Rehabilitation &  Therapy  [address]  [phone]  [phone] [] [facility]  Outpatient Rehabilitation &  Therapy  [address]   P: [phone]  F: [phone]  [] [facility]  Outpatient Rehabilitation &  Therapy  [address]  P: [phone]  F: [phone] [] [facility]  Outpatient Rehabilitation &  Therapy  [address]  P: [phone]  F: [phone] [] [facility]  Outpatient Rehabilitation &  Therapy  [address]  P: [phone]  F: [phone] [] [facility]  Outpatient Rehabilitation &  Therapy  [address]  P: [phone]  F: [phone]  [] [facility] Outpatient Rehabilitation &  Therapy  [address]  P: [phone]  F: [phone]     Physical Therapy Upper Extremity Evaluation    Date:  2025  Patient: Mami RIGGS Octavio  : 1955  MRN: [id number]  Physician: Leon Snyder PA-C     Insurance: [facility] Medicare Dual [facility] Medicaid Supp - Visits BMN - No Auth Req  Medical Diagnosis: M75.41 - R RTC impingement syndrome  Rehab Codes: M25.51, M25.61, R29.3  Onset Date: 2006   Next 's appt: 25 w/ neuro    Subjective:   CC/HPI: Patient reports with a 9+ year history of R shoulder pain though does endorse a more acute flare up. Notes she has had a history of breast cancer on the R side and has had many reconstructive surgeries to the R side as well.

## 2025-07-29 ENCOUNTER — HOSPITAL ENCOUNTER (OUTPATIENT)
Dept: PHYSICAL THERAPY | Facility: CLINIC | Age: 70
Setting detail: THERAPIES SERIES
Discharge: HOME | End: 2025-07-29
Payer: MEDICARE

## 2025-07-29 DIAGNOSIS — E78.00 ELEVATED LDL CHOLESTEROL LEVEL: ICD-10-CM

## 2025-07-29 DIAGNOSIS — Z86.73 HISTORY OF TRANSIENT ISCHEMIC ATTACK (TIA): ICD-10-CM

## 2025-07-29 PROCEDURE — 97110 THERAPEUTIC EXERCISES: CPT

## 2025-07-29 NOTE — FLOWSHEET NOTE
[] [facility]  Outpatient Rehabilitation &  Therapy  [address]  [phone]  [phone] [] [facility]  Outpatient Rehabilitation &  Therapy  [address]  P: [phone]  F: [phone] [x] [facility]  Outpatient Rehabilitation &  Therapy  [address]  P: [phone]  F: [phone] [] [facility]  Outpatient Rehabilitation &  Therapy  [address]  [phone]  [phone] [] [facility]  Outpatient Rehabilitation &  Therapy  [address]   P: [phone]  F: [phone]  [] [facility]  Outpatient Rehabilitation &  Therapy  [address]  P: [phone]  F: [phone] [] [facility]  Outpatient Rehabilitation &  Therapy  [address]  P: [phone]  F: [phone] [] [facility]  Outpatient Rehabilitation &  Therapy  [address]  P: [phone]  F: [phone] [] [facility]  Outpatient Rehabilitation &  Therapy  [address]  P: [phone]  F: [phone]  [] [facility] Outpatient Rehabilitation &  Therapy  [address]  P: [phone]  F: [phone]     Physical Therapy Daily Treatment Note    Date:  2025  Patient Name:  Mami Cunningham    :  1955  MRN: [id number]  Physician: Leon Snyder PA-C                                   Insurance: [facility] Medicare Dual [facility] Medicaid Supp - Visits BMN - No Auth Req  Medical Diagnosis: M75.41 - R RTC impingement syndrome                    Rehab Codes: M25.51, M25.61, R29.3  Onset Date: 2006             Next 's appt: 25 w/ neuro  Visit# / total visits: ; Progress note for Medicare patient due at visit 10    Cancels/No Shows: 0/0    Subjective:    Pain:  [x] Yes  [] No Location: right shoulder Pain Rating: (0-10

## 2025-07-30 RX ORDER — ATORVASTATIN CALCIUM 80 MG/1
80 TABLET, FILM COATED ORAL DAILY
Qty: 90 TABLET | Refills: 1 | Status: SHIPPED | OUTPATIENT
Start: 2025-07-30

## 2025-07-31 ENCOUNTER — HOSPITAL ENCOUNTER (OUTPATIENT)
Dept: PHYSICAL THERAPY | Facility: CLINIC | Age: 70
Setting detail: THERAPIES SERIES
Discharge: HOME | End: 2025-07-31
Payer: MEDICARE

## 2025-07-31 PROCEDURE — 97110 THERAPEUTIC EXERCISES: CPT

## 2025-07-31 NOTE — FLOWSHEET NOTE
[] [facility]  Outpatient Rehabilitation &  Therapy  [address]  [phone]  [phone] [] [facility]  Outpatient Rehabilitation &  Therapy  [address]  P: [phone]  F: [phone] [x] [facility]  Outpatient Rehabilitation &  Therapy  [address]  P: [phone]  F: [phone] [] [facility]  Outpatient Rehabilitation &  Therapy  [address]  [phone]  [phone] [] [facility]  Outpatient Rehabilitation &  Therapy  [address]   P: [phone]  F: [phone]  [] [facility]  Outpatient Rehabilitation &  Therapy  [address]  P: [phone]  F: [phone] [] [facility]  Outpatient Rehabilitation &  Therapy  [address]  P: [phone]  F: [phone] [] [facility]  Outpatient Rehabilitation &  Therapy  [address]  P: [phone]  F: [phone] [] [facility]  Outpatient Rehabilitation &  Therapy  [address]  P: [phone]  F: [phone]  [] [facility] Outpatient Rehabilitation &  Therapy  [address]  P: [phone]  F: [phone]     Physical Therapy Daily Treatment Note    Date:  2025  Patient Name:  Mami Cunningham    :  1955  MRN: [id number]  Physician: Leon Snyder PA-C                                   Insurance: [facility] Medicare Dual [facility] Medicaid Supp - Visits BMN - No Auth Req  Medical Diagnosis: M75.41 - R RTC impingement syndrome                    Rehab Codes: M25.51, M25.61, R29.3  Onset Date: 2006             Next 's appt: 25 w/ neuro  Visit# / total visits: 3/12; Progress note for Medicare patient due at visit 10    Cancels/No Shows: 0/0    Subjective:    Pain:  [x] Yes  [] No Location: right shoulder Pain Rating: (0-10

## 2025-08-08 RX ORDER — DULOXETIN HYDROCHLORIDE 60 MG/1
60 CAPSULE, DELAYED RELEASE ORAL DAILY
Qty: 60 CAPSULE | Refills: 1 | Status: SHIPPED | OUTPATIENT
Start: 2025-08-08

## 2025-08-25 ENCOUNTER — HOSPITAL ENCOUNTER (OUTPATIENT)
Dept: PHYSICAL THERAPY | Facility: CLINIC | Age: 70
Setting detail: THERAPIES SERIES
Discharge: HOME | End: 2025-08-25
Payer: MEDICARE

## 2025-08-25 PROCEDURE — 97110 THERAPEUTIC EXERCISES: CPT

## 2025-08-27 ENCOUNTER — HOSPITAL ENCOUNTER (OUTPATIENT)
Dept: PHYSICAL THERAPY | Facility: CLINIC | Age: 70
Setting detail: THERAPIES SERIES
Discharge: HOME | End: 2025-08-27
Payer: MEDICARE

## 2025-08-27 PROCEDURE — 97110 THERAPEUTIC EXERCISES: CPT

## (undated) DEVICE — TRAP SURG QUAD PARABOLA SLOT DSGN SFTY SCRN TRAPEASE

## (undated) DEVICE — BANDAGE COBAN 4 IN COMPR W4INXL5YD FOAM COHESIVE QUIK STK SELF ADH SFT

## (undated) DEVICE — MEDICINE CUP, GRADUATED, STER: Brand: MEDLINE

## (undated) DEVICE — BASIN EMSIS 700ML GRAPHITE PLAS KID SHP GRAD

## (undated) DEVICE — DRAPE,REIN 53X77,STERILE: Brand: MEDLINE

## (undated) DEVICE — FORCEPS BX L240CM WRK CHN 2.8MM STD CAP W/ NDL MIC MESH

## (undated) DEVICE — Device

## (undated) DEVICE — GLOVE SURG SZ 75 L12IN FNGR THK87MIL WHT LTX FREE

## (undated) DEVICE — 3M™ WARMING BLANKET, UPPER BODY, 10 PER CASE, 42268: Brand: BAIR HUGGER™

## (undated) DEVICE — TOWEL,OR,DSP,ST,BLUE,STD,4/PK,20PK/CS: Brand: MEDLINE

## (undated) DEVICE — APPLICATOR MEDICATED 26 CC SOLUTION HI LT ORNG CHLORAPREP

## (undated) DEVICE — CUFF REPROC TRNQT DPSB W/PLC RED 18IN

## (undated) DEVICE — SURGICAL SUCTION CONNECTING TUBE WITH MALE CONNECTOR AND SUCTION CLAMP, 2 FT. LONG (.6 M), 5 MM I.D.: Brand: CONMED

## (undated) DEVICE — FORCEP BX MESH TOOTH MIC 2.8 MMX240 CM NDL STRL RADIAL JAW 4

## (undated) DEVICE — CO2 CANNULA,SUPERSOFT, ADLT,7'O2,7'CO2: Brand: MEDLINE

## (undated) DEVICE — GLOVE SURG SZ 8 L12IN FNGR THK87MIL WHT LTX FREE

## (undated) DEVICE — GLOVE SURG SZ 65 L12IN FNGR THK87MIL WHT LTX FREE

## (undated) DEVICE — ELECTRODE PT RET AD L9FT HI MOIST COND ADH HYDRGEL CORDED

## (undated) DEVICE — CUP MED 1OZ CLR POLYPR FEED GRAD W/O LID

## (undated) DEVICE — GAUZE,SPONGE,4"X4",16PLY,STRL,LF,10/TRAY: Brand: MEDLINE

## (undated) DEVICE — SPONGE LAP W18XL18IN WHT COT 4 PLY FLD STRUNG RADPQ DISP ST

## (undated) DEVICE — SUTURE MCRYL SZ 3-0 L27IN ABSRB UD L24MM PS-1 3/8 CIR PRIM Y936H

## (undated) DEVICE — C-ARMOR C-ARM EQUIPMENT COVERS CLEAR STERILE UNIVERSAL FIT 12 PER CASE: Brand: C-ARMOR

## (undated) DEVICE — GLOVE SURG SZ 75 L12IN FNGR THK87MIL DK GRN LTX FREE ISOLEX

## (undated) DEVICE — 2108 SERIES SAGITTAL BLADE (19.5 X 1.27 X 81.0MM)

## (undated) DEVICE — SVMMC ORTH SPL DRP PK

## (undated) DEVICE — 2108 SERIES SAGITTAL BLADE, NO OFFSET  (18.6 X 1.24 X 105MM)

## (undated) DEVICE — MARKER,SKIN,WI/RULER AND LABELS: Brand: MEDLINE

## (undated) DEVICE — 3M™ TEGADERM™ CHG DRESSING 25/CARTON 4 CARTONS/CASE 1657: Brand: TEGADERM™

## (undated) DEVICE — SYRINGE MED 50ML LUERLOCK TIP

## (undated) DEVICE — STRAP ARMBRD W1.5XL32IN FOAM STR YET SFT W/ HK AND LOOP

## (undated) DEVICE — GLOVE ORTHO 8   MSG9480

## (undated) DEVICE — C-ARM: Brand: UNBRANDED

## (undated) DEVICE — BIPOLAR SEALER 23-112-1 AQM 6.0: Brand: AQUAMANTYS ®

## (undated) DEVICE — 3M™ IOBAN™ 2 ANTIMICROBIAL INCISE DRAPE 6650EZ: Brand: IOBAN™ 2

## (undated) DEVICE — SUTURE MCRYL SZ 2-0 L27IN ABSRB UD SH L26MM TAPERPOINT NDL Y417H

## (undated) DEVICE — PROTECTOR PRSS FOR PRSS

## (undated) DEVICE — APPLICATOR MEDICATED 10.5 CC SOLUTION HI LT ORNG CHLORAPREP

## (undated) DEVICE — SUTURE PDS II SZ 0 L27IN ABSRB VLT L36MM CT-1 1/2 CIR Z340H

## (undated) DEVICE — DRAPE,U/ SHT,SPLIT,PLAS,STERIL: Brand: MEDLINE

## (undated) DEVICE — GOWN,AURORA,NONREINFORCED,LARGE: Brand: MEDLINE

## (undated) DEVICE — BANDAGE COMPR W6INXL12FT SMOOTH FOR LIMB EXSANG ESMARCH

## (undated) DEVICE — CONMED DISPOSABLE GASTROINTESTINAL CYTOLOGY BRUSH, STRAIGHT HANDLE, 2.5 MM X 160 CM: Brand: CONMED

## (undated) DEVICE — ZIMMER® STERILE DISPOSABLE TOURNIQUET CUFF WITH PLC, DUAL PORT, SINGLE BLADDER, 34 IN. (86 CM)

## (undated) DEVICE — ADHESIVE SKIN CLSR 0.7ML TOP DERMBND ADV

## (undated) DEVICE — INTENDED FOR TISSUE SEPARATION, AND OTHER PROCEDURES THAT REQUIRE A SHARP SURGICAL BLADE TO PUNCTURE OR CUT.: Brand: BARD-PARKER ® CARBON RIB-BACK BLADES

## (undated) DEVICE — Device: Brand: DEFENDO VALVE AND CONNECTOR KIT

## (undated) DEVICE — CYSTO/BLADDER IRRIGATION SET, REGULATING CLAMP

## (undated) DEVICE — TOTAL TRAY, DB, 100% SILI FOLEY, 16FR 10: Brand: MEDLINE

## (undated) DEVICE — PAD COOL W10.9XL11.3IN UNIV REG HOSE WRP ON THER CLD

## (undated) DEVICE — SNARE ENDOSCP M L240CM LOOP W27MM SHTH DIA2.4MM OVL FLX

## (undated) DEVICE — GARMENT,MEDLINE,DVT,INT,CALF,MED, GEN2: Brand: MEDLINE

## (undated) DEVICE — SILVER-COATED ANTIMICROBIAL BARRIER DRESSING: Brand: ACTICOAT FLEX3 4" X 4"

## (undated) DEVICE — DISCONTINUED NO SUB IDED TG GLOVE SURG SENSICARE ALOE LT LF PF ST GRN SZ 8

## (undated) DEVICE — Z DISCONTINUED USE 2624853 GLOVE SURG SZ 75 L12IN THK91MIL BRN LTX FREE

## (undated) DEVICE — JELLY,LUBE,STERILE,FLIP TOP,TUBE,2-OZ: Brand: MEDLINE

## (undated) DEVICE — 4-PORT MANIFOLD: Brand: NEPTUNE 2

## (undated) DEVICE — ZIMMER® STERILE DISPOSABLE TOURNIQUET CUFF WITH PLC, DUAL PORT, SINGLE BLADDER, 30 IN. (76 CM)

## (undated) DEVICE — STERILE PATIENT PROTECTIVE PAD FOR IMP® KNEE POSITIONERS & COHESIVE WRAP (10 / CASE): Brand: DE MAYO KNEE POSITIONER®

## (undated) DEVICE — STRAP,POSITIONING,KNEE/BODY,FOAM,4X60": Brand: MEDLINE

## (undated) DEVICE — ADAPTER TBNG LUER STUB 15 GA INTMED

## (undated) DEVICE — GOWN,SIRUS,NON REINFRCD,LARGE,SET IN SL: Brand: MEDLINE

## (undated) DEVICE — SUTURE STRATAFIX SYMMETRIC PDS + SZ 0 L18IN ABSRB VLT CT SXPP1A406

## (undated) DEVICE — SUTURE MCRYL SZ 4-0 L27IN ABSRB UD L24MM PS-1 3/8 CIR PRIM Y935H

## (undated) DEVICE — CEMENT MIXING SYSTEM WITH FEMORAL BREAKWAY NOZZLE: Brand: REVOLUTION

## (undated) DEVICE — GLOVE SURG SZ 8 L12IN THK91MIL BRN LTX FREE POLYCHLOROPRENE

## (undated) DEVICE — GLOVE SURG SZ 7 L12IN FNGR THK87MIL WHT LTX FREE

## (undated) DEVICE — CHLORAPREP 26ML ORANGE

## (undated) DEVICE — TUBING, SUCTION, 1/4" X 12', STRAIGHT: Brand: MEDLINE

## (undated) DEVICE — BANDAGE,GAUZE,BULKEE II,4.5"X4.1YD,STRL: Brand: MEDLINE

## (undated) DEVICE — BLOCK BITE 60FR RUBBER ADLT DENTAL

## (undated) DEVICE — SURGICAL NAVIGATION PACK SUPL NAVIO

## (undated) DEVICE — BLADE ES L6IN ELASTOMERIC COAT EXT DURABLE BEND UPTO 90DEG

## (undated) DEVICE — PICO SINGLE USE NEGATIVE PRESSURE WOUND THERAPY SYSTEM 10CM X 30CM 4IN. X 12IN.: Brand: PICO

## (undated) DEVICE — GLOVE ORANGE PI 8   MSG9080